# Patient Record
Sex: FEMALE | Race: WHITE | Employment: FULL TIME | URBAN - METROPOLITAN AREA
[De-identification: names, ages, dates, MRNs, and addresses within clinical notes are randomized per-mention and may not be internally consistent; named-entity substitution may affect disease eponyms.]

---

## 2017-02-16 PROBLEM — I26.99 PULMONARY EMBOLI (HCC): Status: ACTIVE | Noted: 2017-02-16

## 2017-07-17 ENCOUNTER — OFFICE VISIT (OUTPATIENT)
Dept: SURGERY | Age: 22
End: 2017-07-17

## 2017-07-17 VITALS
SYSTOLIC BLOOD PRESSURE: 126 MMHG | WEIGHT: 126 LBS | BODY MASS INDEX: 23.19 KG/M2 | DIASTOLIC BLOOD PRESSURE: 74 MMHG | HEIGHT: 62 IN

## 2017-07-17 DIAGNOSIS — K80.20 CALCULUS OF GALLBLADDER WITHOUT CHOLECYSTITIS WITHOUT OBSTRUCTION: Primary | ICD-10-CM

## 2017-07-17 PROCEDURE — 99204 OFFICE O/P NEW MOD 45 MIN: CPT | Performed by: SURGERY

## 2017-07-17 RX ORDER — OXYCODONE HYDROCHLORIDE 5 MG/1
TABLET ORAL
Qty: 20 TABLET | Refills: 0 | Status: SHIPPED | OUTPATIENT
Start: 2017-07-17 | End: 2017-07-27 | Stop reason: HOSPADM

## 2017-07-17 ASSESSMENT — ENCOUNTER SYMPTOMS
ABDOMINAL DISTENTION: 0
WHEEZING: 1
SHORTNESS OF BREATH: 1
BACK PAIN: 1
FACIAL SWELLING: 1
CHEST TIGHTNESS: 1
EYE PAIN: 1
COLOR CHANGE: 0
ABDOMINAL PAIN: 0
COUGH: 1

## 2017-07-27 ENCOUNTER — HOSPITAL ENCOUNTER (OUTPATIENT)
Dept: SURGERY | Age: 22
Discharge: HOME | End: 2017-07-27
Attending: SURGERY | Admitting: SURGERY

## 2017-07-27 VITALS
OXYGEN SATURATION: 100 % | BODY MASS INDEX: 23.7 KG/M2 | HEIGHT: 62 IN | SYSTOLIC BLOOD PRESSURE: 107 MMHG | RESPIRATION RATE: 17 BRPM | WEIGHT: 128.8 LBS | HEART RATE: 71 BPM | DIASTOLIC BLOOD PRESSURE: 67 MMHG | TEMPERATURE: 96.8 F

## 2017-07-27 DIAGNOSIS — R52 PAIN: ICD-10-CM

## 2017-07-27 LAB — HCG(URINE) PREGNANCY TEST: NEGATIVE

## 2017-07-27 PROCEDURE — 47563 LAPARO CHOLECYSTECTOMY/GRAPH: CPT | Performed by: SURGERY

## 2017-07-27 RX ORDER — LIDOCAINE HYDROCHLORIDE 10 MG/ML
1 INJECTION, SOLUTION EPIDURAL; INFILTRATION; INTRACAUDAL; PERINEURAL
Status: ACTIVE | OUTPATIENT
Start: 2017-07-27 | End: 2017-07-27

## 2017-07-27 RX ORDER — SODIUM CHLORIDE 9 MG/ML
INJECTION, SOLUTION INTRAVENOUS CONTINUOUS
Status: DISCONTINUED | OUTPATIENT
Start: 2017-07-27 | End: 2017-07-28 | Stop reason: HOSPADM

## 2017-07-27 RX ORDER — INDOCYANINE GREEN AND WATER 25 MG
KIT INJECTION
Status: COMPLETED
Start: 2017-07-27 | End: 2017-07-27

## 2017-07-27 RX ORDER — PROMETHAZINE HYDROCHLORIDE 25 MG/ML
6.25 INJECTION, SOLUTION INTRAMUSCULAR; INTRAVENOUS ONCE
Status: COMPLETED | OUTPATIENT
Start: 2017-07-27 | End: 2017-07-27

## 2017-07-27 RX ORDER — CEFAZOLIN SODIUM 2 G/100ML
2 INJECTION, SOLUTION INTRAVENOUS ONCE
Status: COMPLETED | OUTPATIENT
Start: 2017-07-27 | End: 2017-07-27

## 2017-07-27 RX ORDER — MEPERIDINE HYDROCHLORIDE 25 MG/ML
12.5 INJECTION INTRAMUSCULAR; INTRAVENOUS; SUBCUTANEOUS EVERY 5 MIN PRN
Status: DISCONTINUED | OUTPATIENT
Start: 2017-07-27 | End: 2017-07-28 | Stop reason: HOSPADM

## 2017-07-27 RX ORDER — OXYCODONE HYDROCHLORIDE 5 MG/1
TABLET ORAL
Qty: 40 TABLET | Refills: 0 | Status: SHIPPED | OUTPATIENT
Start: 2017-07-27 | End: 2017-08-11 | Stop reason: SDUPTHER

## 2017-07-27 RX ORDER — HYDROMORPHONE HCL 110MG/55ML
0.5 PATIENT CONTROLLED ANALGESIA SYRINGE INTRAVENOUS EVERY 5 MIN PRN
Status: DISCONTINUED | OUTPATIENT
Start: 2017-07-27 | End: 2017-07-28 | Stop reason: HOSPADM

## 2017-07-27 RX ORDER — HYDROCODONE BITARTRATE AND ACETAMINOPHEN 5; 325 MG/1; MG/1
1 TABLET ORAL
Status: ACTIVE | OUTPATIENT
Start: 2017-07-27 | End: 2017-07-27

## 2017-07-27 RX ORDER — PROMETHAZINE HYDROCHLORIDE 25 MG/ML
INJECTION, SOLUTION INTRAMUSCULAR; INTRAVENOUS
Status: DISPENSED
Start: 2017-07-27 | End: 2017-07-27

## 2017-07-27 RX ORDER — HYDROMORPHONE HCL 110MG/55ML
0.25 PATIENT CONTROLLED ANALGESIA SYRINGE INTRAVENOUS EVERY 5 MIN PRN
Status: DISCONTINUED | OUTPATIENT
Start: 2017-07-27 | End: 2017-07-28 | Stop reason: HOSPADM

## 2017-07-27 RX ORDER — INDOCYANINE GREEN AND WATER 25 MG
5 KIT INJECTION ONCE
Status: COMPLETED | OUTPATIENT
Start: 2017-07-27 | End: 2017-07-27

## 2017-07-27 RX ORDER — ONDANSETRON 2 MG/ML
4 INJECTION INTRAMUSCULAR; INTRAVENOUS
Status: COMPLETED | OUTPATIENT
Start: 2017-07-27 | End: 2017-07-27

## 2017-07-27 RX ORDER — CEFAZOLIN SODIUM 2 G/100ML
INJECTION, SOLUTION INTRAVENOUS
Status: COMPLETED
Start: 2017-07-27 | End: 2017-07-27

## 2017-07-27 RX ADMIN — INDOCYANINE GREEN AND WATER 5 MG: KIT at 09:45

## 2017-07-27 RX ADMIN — PROMETHAZINE HYDROCHLORIDE 6.25 MG: 25 INJECTION, SOLUTION INTRAMUSCULAR; INTRAVENOUS at 11:54

## 2017-07-27 RX ADMIN — Medication 0.5 MG: at 11:48

## 2017-07-27 RX ADMIN — CEFAZOLIN SODIUM 2 G: 2 INJECTION, SOLUTION INTRAVENOUS at 09:54

## 2017-07-27 RX ADMIN — ONDANSETRON 4 MG: 2 INJECTION INTRAMUSCULAR; INTRAVENOUS at 11:48

## 2017-07-27 ASSESSMENT — PAIN DESCRIPTION - PAIN TYPE
TYPE: SURGICAL PAIN

## 2017-07-27 ASSESSMENT — PAIN DESCRIPTION - LOCATION: LOCATION: ABDOMEN

## 2017-07-27 ASSESSMENT — PAIN SCALES - GENERAL
PAINLEVEL_OUTOF10: 4
PAINLEVEL_OUTOF10: 9
PAINLEVEL_OUTOF10: 5
PAINLEVEL_OUTOF10: 0
PAINLEVEL_OUTOF10: 4
PAINLEVEL_OUTOF10: 9
PAINLEVEL_OUTOF10: 4
PAINLEVEL_OUTOF10: 9

## 2017-07-27 ASSESSMENT — PAIN - FUNCTIONAL ASSESSMENT: PAIN_FUNCTIONAL_ASSESSMENT: 0-10

## 2017-07-27 ASSESSMENT — PAIN DESCRIPTION - ORIENTATION: ORIENTATION: LEFT

## 2017-08-11 ENCOUNTER — OFFICE VISIT (OUTPATIENT)
Dept: SURGERY | Age: 22
End: 2017-08-11

## 2017-08-11 VITALS — SYSTOLIC BLOOD PRESSURE: 106 MMHG | WEIGHT: 129.6 LBS | BODY MASS INDEX: 23.7 KG/M2 | DIASTOLIC BLOOD PRESSURE: 72 MMHG

## 2017-08-11 DIAGNOSIS — Z09 SURGERY FOLLOW-UP: Primary | ICD-10-CM

## 2017-08-11 PROCEDURE — 99024 POSTOP FOLLOW-UP VISIT: CPT | Performed by: SURGERY

## 2017-08-11 RX ORDER — OXYCODONE HYDROCHLORIDE 5 MG/1
TABLET ORAL
Qty: 20 TABLET | Refills: 0 | Status: SHIPPED | OUTPATIENT
Start: 2017-08-11 | End: 2018-01-31 | Stop reason: ALTCHOICE

## 2018-04-06 PROBLEM — F22 PSYCHOSIS, PARANOID (HCC): Status: ACTIVE | Noted: 2018-04-06

## 2018-04-06 PROBLEM — F12.20 CANNABIS USE DISORDER, MODERATE, DEPENDENCE (HCC): Status: ACTIVE | Noted: 2018-02-08

## 2018-10-26 ENCOUNTER — HOSPITAL ENCOUNTER (EMERGENCY)
Age: 23
Discharge: HOME | End: 2018-10-26
Attending: EMERGENCY MEDICINE
Payer: MEDICAID

## 2018-10-26 VITALS
WEIGHT: 163.19 LBS | BODY MASS INDEX: 28.91 KG/M2 | TEMPERATURE: 98.4 F | OXYGEN SATURATION: 98 % | RESPIRATION RATE: 16 BRPM | HEIGHT: 63 IN | DIASTOLIC BLOOD PRESSURE: 78 MMHG | SYSTOLIC BLOOD PRESSURE: 119 MMHG | HEART RATE: 90 BPM

## 2018-10-26 DIAGNOSIS — H65.03 BILATERAL ACUTE SEROUS OTITIS MEDIA, RECURRENCE NOT SPECIFIED: ICD-10-CM

## 2018-10-26 DIAGNOSIS — J20.9 ACUTE BRONCHITIS, UNSPECIFIED ORGANISM: Primary | ICD-10-CM

## 2018-10-26 PROCEDURE — 99283 EMERGENCY DEPT VISIT LOW MDM: CPT

## 2018-10-26 PROCEDURE — 6370000000 HC RX 637 (ALT 250 FOR IP): Performed by: EMERGENCY MEDICINE

## 2018-10-26 RX ORDER — PREDNISONE 20 MG/1
40 TABLET ORAL DAILY
Qty: 10 TABLET | Refills: 0 | Status: SHIPPED | OUTPATIENT
Start: 2018-10-26 | End: 2018-10-31

## 2018-10-26 RX ORDER — CIPROFLOXACIN 500 MG/1
500 TABLET, FILM COATED ORAL 2 TIMES DAILY
Qty: 20 TABLET | Refills: 0 | Status: SHIPPED | OUTPATIENT
Start: 2018-10-26 | End: 2018-11-05

## 2018-10-26 RX ORDER — ALBUTEROL SULFATE 90 UG/1
2 AEROSOL, METERED RESPIRATORY (INHALATION) EVERY 4 HOURS PRN
Qty: 1 INHALER | Refills: 3 | Status: SHIPPED | OUTPATIENT
Start: 2018-10-26 | End: 2019-01-25

## 2018-10-26 RX ORDER — PREDNISONE 20 MG/1
40 TABLET ORAL ONCE
Status: COMPLETED | OUTPATIENT
Start: 2018-10-26 | End: 2018-10-26

## 2018-10-26 RX ORDER — IPRATROPIUM BROMIDE AND ALBUTEROL SULFATE 2.5; .5 MG/3ML; MG/3ML
1 SOLUTION RESPIRATORY (INHALATION) ONCE
Status: COMPLETED | OUTPATIENT
Start: 2018-10-26 | End: 2018-10-26

## 2018-10-26 RX ADMIN — PREDNISONE 40 MG: 20 TABLET ORAL at 13:33

## 2018-10-26 RX ADMIN — IPRATROPIUM BROMIDE AND ALBUTEROL SULFATE 1 AMPULE: .5; 3 SOLUTION RESPIRATORY (INHALATION) at 12:58

## 2018-10-26 ASSESSMENT — PAIN DESCRIPTION - LOCATION: LOCATION: BACK

## 2018-10-26 ASSESSMENT — ENCOUNTER SYMPTOMS
EYE DISCHARGE: 0
NAUSEA: 0
SHORTNESS OF BREATH: 1
BACK PAIN: 0
DIARRHEA: 0
ABDOMINAL PAIN: 0
VOMITING: 0
COUGH: 1
WHEEZING: 0
SORE THROAT: 0
RHINORRHEA: 0
EYE PAIN: 0

## 2018-10-26 ASSESSMENT — PAIN SCALES - GENERAL
PAINLEVEL_OUTOF10: 0
PAINLEVEL_OUTOF10: 3

## 2018-10-26 NOTE — ED NOTES
Discharge instructions given to patient. She states understanding. Patient discharged to home.       Shannon Mcgill RN  10/26/18 [id number]

## 2018-12-23 ENCOUNTER — HOSPITAL ENCOUNTER (EMERGENCY)
Age: 23
Discharge: HOME | End: 2018-12-23
Payer: MEDICAID

## 2018-12-23 ENCOUNTER — APPOINTMENT (OUTPATIENT)
Dept: GENERAL RADIOLOGY | Age: 23
End: 2018-12-23
Payer: MEDICAID

## 2018-12-23 VITALS
HEART RATE: 88 BPM | SYSTOLIC BLOOD PRESSURE: 124 MMHG | DIASTOLIC BLOOD PRESSURE: 78 MMHG | TEMPERATURE: 98.6 F | BODY MASS INDEX: 30.89 KG/M2 | OXYGEN SATURATION: 98 % | WEIGHT: 174.38 LBS | RESPIRATION RATE: 14 BRPM
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
## 2018-12-23 DIAGNOSIS — M54.50 ACUTE MIDLINE LOW BACK PAIN WITHOUT SCIATICA: ICD-10-CM

## 2018-12-23 LAB — HCG(URINE) PREGNANCY TEST: NEGATIVE

## 2018-12-23 PROCEDURE — 84703 CHORIONIC GONADOTROPIN ASSAY: CPT

## 2018-12-23 PROCEDURE — 72100 X-RAY EXAM L-S SPINE 2/3 VWS: CPT

## 2018-12-23 PROCEDURE — 6370000000 HC RX 637 (ALT 250 FOR IP): Performed by: PHYSICIAN ASSISTANT

## 2018-12-23 PROCEDURE — 99284 EMERGENCY DEPT VISIT MOD MDM: CPT

## 2018-12-23 RX ORDER — CYCLOBENZAPRINE HCL 10 MG
10 TABLET ORAL 3 TIMES DAILY PRN
Qty: 20 TABLET | Refills: 0 | Status: SHIPPED | OUTPATIENT
Start: 2018-12-23 | End: 2019-01-25

## 2018-12-23 RX ORDER — IBUPROFEN 400 MG/1
800 TABLET ORAL ONCE
Status: COMPLETED | OUTPATIENT
Start: 2018-12-23 | End: 2018-12-23

## 2018-12-23 RX ORDER — IBUPROFEN 800 MG/1
800 TABLET ORAL EVERY 8 HOURS PRN
Qty: 20 TABLET | Refills: 0 | Status: SHIPPED | OUTPATIENT
Start: 2018-12-23 | End: 2019-01-25

## 2018-12-23 RX ORDER — HYDROCODONE BITARTRATE AND ACETAMINOPHEN 5; 325 MG/1; MG/1
1 TABLET ORAL EVERY 6 HOURS PRN
Qty: 5 TABLET | Refills: 0 | Status: SHIPPED | OUTPATIENT
Start: 2018-12-23 | End: 2018-12-26

## 2018-12-23 RX ORDER — ACETAMINOPHEN 325 MG/1
650 TABLET ORAL ONCE
Status: COMPLETED | OUTPATIENT
Start: 2018-12-23 | End: 2018-12-23

## 2018-12-23 RX ADMIN — IBUPROFEN 800 MG: 400 TABLET ORAL at 11:45

## 2018-12-23 RX ADMIN — ACETAMINOPHEN 650 MG: 325 TABLET, FILM COATED ORAL at 11:45

## 2018-12-23 ASSESSMENT — ENCOUNTER SYMPTOMS
ABDOMINAL PAIN: 0
VOMITING: 0
SHORTNESS OF BREATH: 0
NAUSEA: 1
BACK PAIN: 1

## 2018-12-23 ASSESSMENT — PAIN DESCRIPTION - PAIN TYPE: TYPE: ACUTE PAIN

## 2018-12-23 ASSESSMENT — PAIN SCALES - GENERAL
PAINLEVEL_OUTOF10: 8
PAINLEVEL_OUTOF10: 8
PAINLEVEL_OUTOF10: 7

## 2018-12-23 ASSESSMENT — PAIN DESCRIPTION - ORIENTATION: ORIENTATION: RIGHT;LEFT;LOWER

## 2018-12-23 ASSESSMENT — PAIN DESCRIPTION - LOCATION: LOCATION: BACK

## 2018-12-23 ASSESSMENT — PAIN DESCRIPTION - DESCRIPTORS: DESCRIPTORS: ACHING

## 2018-12-23 ASSESSMENT — PAIN DESCRIPTION - FREQUENCY: FREQUENCY: CONTINUOUS

## 2018-12-23 NOTE — ED PROVIDER NOTES
well-nourished. HENT:   Head: Normocephalic and atraumatic. Right Ear: External ear normal.   Left Ear: External ear normal.   Nose: Nose normal.   Mouth/Throat: Oropharynx is clear and moist.   TMs clear bilaterally. No hemotympanum. No raccoon eyes or wolfe signs bilaterally. Eyes: Pupils are equal, round, and reactive to light. Conjunctivae and EOM are normal.   Neck: Normal range of motion. Neck supple. No tenderness palpation of the cervical midline spine   Cardiovascular: Normal rate, regular rhythm and normal heart sounds. Pulmonary/Chest: Effort normal and breath sounds normal. No respiratory distress. Abdominal: Soft. She exhibits no distension and no mass. There is no tenderness. There is no rebound and no guarding. No hernia. No bruising on abdomen   Musculoskeletal: Normal range of motion. No bruising across chest.  No tenderness palpation of the thoracic midline. Mild tender to palpation of the lumbar midline and the left paraspinal area. No right paraspinal tenderness. No bruising on back. No bruising on flanks  bilaterally. Neurological: She is alert and oriented to person, place, and time. Normal sensation in all 3 dermatomes of the feet bilaterally. DP pulses 2+ bilaterally. Achilles DTRs 2+ bilaterally. 5/5 strength inversions eversion plantar flexion dorsiflexion bilateral feet. Skin: Skin is warm and dry. Psychiatric: She has a normal mood and affect.  Her behavior is normal. Judgment and thought content normal.       DIFFERENTIAL DIAGNOSIS   Cauda equina, spinal stenosis, strain, disk herniation, epidural abscess, pylonephritis,Kidney stone, epidural hematoma      DIAGNOSTIC RESULTS       RADIOLOGY:   Non-plain film images such as CT, Ultrasound and MRI are read by the radiologist.Plain radiographic images are visualized and preliminarily interpreted by LOVELY Potter with the below findings:      Interpretation per theRadiologist below, if available

## 2019-01-25 ENCOUNTER — APPOINTMENT (OUTPATIENT)
Dept: ULTRASOUND IMAGING | Age: 24
End: 2019-01-25
Payer: MEDICAID

## 2019-01-25 ENCOUNTER — APPOINTMENT (OUTPATIENT)
Dept: CT IMAGING | Age: 24
End: 2019-01-25
Payer: MEDICAID

## 2019-01-25 ENCOUNTER — HOSPITAL ENCOUNTER (EMERGENCY)
Age: 24
Discharge: HOME | End: 2019-01-25
Payer: MEDICAID

## 2019-01-25 VITALS
DIASTOLIC BLOOD PRESSURE: 63 MMHG | HEIGHT: 65 IN | HEART RATE: 84 BPM | RESPIRATION RATE: 20 BRPM | WEIGHT: 174.6 LBS | OXYGEN SATURATION: 100 % | BODY MASS INDEX: 29.09 KG/M2 | TEMPERATURE: 98.3 F | SYSTOLIC BLOOD PRESSURE: 102 MMHG

## 2019-01-25 DIAGNOSIS — N10 ACUTE PYELONEPHRITIS: Primary | ICD-10-CM

## 2019-01-25 LAB
A/G RATIO: 1.8 (ref 1.1–2.2)
ALBUMIN SERPL-MCNC: 4.4 G/DL (ref 3.4–5)
ALP BLD-CCNC: 48 U/L (ref 40–129)
ALT SERPL-CCNC: 26 U/L (ref 10–40)
ANION GAP SERPL CALCULATED.3IONS-SCNC: 10 MMOL/L (ref 3–16)
AST SERPL-CCNC: 20 U/L (ref 15–37)
BACTERIA WET PREP: NORMAL
BACTERIA: ABNORMAL /HPF
BASOPHILS ABSOLUTE: 0 K/UL (ref 0–0.2)
BASOPHILS RELATIVE PERCENT: 0.7 %
BILIRUB SERPL-MCNC: 0.6 MG/DL (ref 0–1)
BILIRUBIN URINE: ABNORMAL
BLOOD, URINE: NEGATIVE
BUN BLDV-MCNC: 9 MG/DL (ref 7–20)
CALCIUM SERPL-MCNC: 8.6 MG/DL (ref 8.3–10.6)
CHLORIDE BLD-SCNC: 104 MMOL/L (ref 99–110)
CLARITY: ABNORMAL
CLUE CELLS: NORMAL
CO2: 25 MMOL/L (ref 21–32)
COLOR: ABNORMAL
CREAT SERPL-MCNC: 0.6 MG/DL (ref 0.6–1.1)
EOSINOPHILS ABSOLUTE: 0.1 K/UL (ref 0–0.6)
EOSINOPHILS RELATIVE PERCENT: 2.1 %
EPITHELIAL CELLS WET PREP: NORMAL
EPITHELIAL CELLS, UA: 7 /HPF (ref 0–5)
GFR AFRICAN AMERICAN: >60
GFR NON-AFRICAN AMERICAN: >60
GLOBULIN: 2.4 G/DL
GLUCOSE BLD-MCNC: 89 MG/DL (ref 70–99)
GLUCOSE URINE: NEGATIVE MG/DL
GONADOTROPIN, CHORIONIC (HCG) QUANT: <5 MIU/ML
HCG(URINE) PREGNANCY TEST: NEGATIVE
HCT VFR BLD CALC: 42.6 % (ref 36–48)
HEMOGLOBIN: 14.5 G/DL (ref 12–16)
KETONES, URINE: NEGATIVE MG/DL
LEUKOCYTE ESTERASE, URINE: ABNORMAL
LIPASE: 12 U/L (ref 13–60)
LYMPHOCYTES ABSOLUTE: 1.1 K/UL (ref 1–5.1)
LYMPHOCYTES RELATIVE PERCENT: 23.5 %
MCH RBC QN AUTO: 29.4 PG (ref 26–34)
MCHC RBC AUTO-ENTMCNC: 33.9 G/DL (ref 31–36)
MCV RBC AUTO: 86.7 FL (ref 80–100)
MICROSCOPIC EXAMINATION: YES
MONOCYTES ABSOLUTE: 0.8 K/UL (ref 0–1.3)
MONOCYTES RELATIVE PERCENT: 16.4 %
NEUTROPHILS ABSOLUTE: 2.7 K/UL (ref 1.7–7.7)
NEUTROPHILS RELATIVE PERCENT: 57.3 %
NITRITE, URINE: NEGATIVE
PDW BLD-RTO: 15.4 % (ref 12.4–15.4)
PH UA: 6
PLATELET # BLD: 198 K/UL (ref 135–450)
PMV BLD AUTO: 8.2 FL (ref 5–10.5)
POTASSIUM SERPL-SCNC: 3.8 MMOL/L (ref 3.5–5.1)
PROTEIN UA: ABNORMAL MG/DL
RBC # BLD: 4.91 M/UL (ref 4–5.2)
RBC UA: ABNORMAL /HPF (ref 0–2)
RBC WET PREP: NORMAL
SODIUM BLD-SCNC: 139 MMOL/L (ref 136–145)
SOURCE WET PREP: NORMAL
SPECIFIC GRAVITY UA: 1.03
TOTAL PROTEIN: 6.8 G/DL (ref 6.4–8.2)
TRICHOMONAS PREP: NORMAL
URINE REFLEX TO CULTURE: YES
URINE TYPE: ABNORMAL
UROBILINOGEN, URINE: 1 E.U./DL
WBC # BLD: 4.7 K/UL (ref 4–11)
WBC UA: 117 /HPF (ref 0–5)
WBC WET PREP: NORMAL
YEAST WET PREP: NORMAL

## 2019-01-25 PROCEDURE — 96365 THER/PROPH/DIAG IV INF INIT: CPT

## 2019-01-25 PROCEDURE — 80053 COMPREHEN METABOLIC PANEL: CPT

## 2019-01-25 PROCEDURE — 81001 URINALYSIS AUTO W/SCOPE: CPT

## 2019-01-25 PROCEDURE — 87591 N.GONORRHOEAE DNA AMP PROB: CPT

## 2019-01-25 PROCEDURE — 96361 HYDRATE IV INFUSION ADD-ON: CPT

## 2019-01-25 PROCEDURE — 36415 COLL VENOUS BLD VENIPUNCTURE: CPT

## 2019-01-25 PROCEDURE — 85025 COMPLETE CBC W/AUTO DIFF WBC: CPT

## 2019-01-25 PROCEDURE — 6360000002 HC RX W HCPCS: Performed by: PHYSICIAN ASSISTANT

## 2019-01-25 PROCEDURE — 76830 TRANSVAGINAL US NON-OB: CPT

## 2019-01-25 PROCEDURE — 84703 CHORIONIC GONADOTROPIN ASSAY: CPT

## 2019-01-25 PROCEDURE — 84702 CHORIONIC GONADOTROPIN TEST: CPT

## 2019-01-25 PROCEDURE — 99284 EMERGENCY DEPT VISIT MOD MDM: CPT

## 2019-01-25 PROCEDURE — 2580000003 HC RX 258: Performed by: PHYSICIAN ASSISTANT

## 2019-01-25 PROCEDURE — 76856 US EXAM PELVIC COMPLETE: CPT

## 2019-01-25 PROCEDURE — 83690 ASSAY OF LIPASE: CPT

## 2019-01-25 PROCEDURE — 96376 TX/PRO/DX INJ SAME DRUG ADON: CPT

## 2019-01-25 PROCEDURE — 87086 URINE CULTURE/COLONY COUNT: CPT

## 2019-01-25 PROCEDURE — 74176 CT ABD & PELVIS W/O CONTRAST: CPT

## 2019-01-25 PROCEDURE — 87491 CHLMYD TRACH DNA AMP PROBE: CPT

## 2019-01-25 PROCEDURE — 87210 SMEAR WET MOUNT SALINE/INK: CPT

## 2019-01-25 PROCEDURE — 96375 TX/PRO/DX INJ NEW DRUG ADDON: CPT

## 2019-01-25 RX ORDER — TRAMADOL HYDROCHLORIDE 50 MG/1
50 TABLET ORAL EVERY 6 HOURS PRN
Qty: 6 TABLET | Refills: 0 | Status: SHIPPED | OUTPATIENT
Start: 2019-01-25 | End: 2019-01-28

## 2019-01-25 RX ORDER — KETOROLAC TROMETHAMINE 30 MG/ML
30 INJECTION, SOLUTION INTRAMUSCULAR; INTRAVENOUS ONCE
Status: COMPLETED | OUTPATIENT
Start: 2019-01-25 | End: 2019-01-25

## 2019-01-25 RX ORDER — ONDANSETRON 2 MG/ML
4 INJECTION INTRAMUSCULAR; INTRAVENOUS ONCE
Status: COMPLETED | OUTPATIENT
Start: 2019-01-25 | End: 2019-01-25

## 2019-01-25 RX ORDER — 0.9 % SODIUM CHLORIDE 0.9 %
1000 INTRAVENOUS SOLUTION INTRAVENOUS ONCE
Status: COMPLETED | OUTPATIENT
Start: 2019-01-25 | End: 2019-01-25

## 2019-01-25 RX ORDER — KETOROLAC TROMETHAMINE 10 MG/1
10 TABLET, FILM COATED ORAL EVERY 6 HOURS PRN
Qty: 10 TABLET | Refills: 0 | Status: SHIPPED | OUTPATIENT
Start: 2019-01-25 | End: 2019-04-24 | Stop reason: ALTCHOICE

## 2019-01-25 RX ORDER — CEFUROXIME AXETIL 500 MG/1
500 TABLET ORAL 2 TIMES DAILY
Qty: 20 TABLET | Refills: 0 | Status: SHIPPED | OUTPATIENT
Start: 2019-01-25 | End: 2019-02-04

## 2019-01-25 RX ORDER — ONDANSETRON 4 MG/1
4 TABLET, ORALLY DISINTEGRATING ORAL EVERY 8 HOURS PRN
Qty: 10 TABLET | Refills: 0 | Status: SHIPPED | OUTPATIENT
Start: 2019-01-25 | End: 2019-04-24 | Stop reason: ALTCHOICE

## 2019-01-25 RX ORDER — DIPHENHYDRAMINE HYDROCHLORIDE 50 MG/ML
25 INJECTION INTRAMUSCULAR; INTRAVENOUS ONCE
Status: COMPLETED | OUTPATIENT
Start: 2019-01-25 | End: 2019-01-25

## 2019-01-25 RX ADMIN — HYDROMORPHONE HYDROCHLORIDE 0.5 MG: 1 INJECTION, SOLUTION INTRAMUSCULAR; INTRAVENOUS; SUBCUTANEOUS at 09:58

## 2019-01-25 RX ADMIN — KETOROLAC TROMETHAMINE 30 MG: 30 INJECTION, SOLUTION INTRAMUSCULAR at 12:03

## 2019-01-25 RX ADMIN — SODIUM CHLORIDE 1000 ML: 9 INJECTION, SOLUTION INTRAVENOUS at 09:35

## 2019-01-25 RX ADMIN — ONDANSETRON 4 MG: 2 INJECTION INTRAMUSCULAR; INTRAVENOUS at 09:35

## 2019-01-25 RX ADMIN — DIPHENHYDRAMINE HYDROCHLORIDE 25 MG: 50 INJECTION, SOLUTION INTRAMUSCULAR; INTRAVENOUS at 12:03

## 2019-01-25 RX ADMIN — CEFTRIAXONE 1 G: 1 INJECTION, POWDER, FOR SOLUTION INTRAMUSCULAR; INTRAVENOUS at 12:23

## 2019-01-25 RX ADMIN — HYDROMORPHONE HYDROCHLORIDE 1 MG: 1 INJECTION, SOLUTION INTRAMUSCULAR; INTRAVENOUS; SUBCUTANEOUS at 10:43

## 2019-01-25 ASSESSMENT — ENCOUNTER SYMPTOMS
SORE THROAT: 0
BACK PAIN: 1
SHORTNESS OF BREATH: 0
DIARRHEA: 0
ABDOMINAL PAIN: 1
COUGH: 0
CONSTIPATION: 1
BLOOD IN STOOL: 0
NAUSEA: 0
VOMITING: 1

## 2019-01-25 ASSESSMENT — PAIN SCALES - GENERAL
PAINLEVEL_OUTOF10: 4
PAINLEVEL_OUTOF10: 4
PAINLEVEL_OUTOF10: 10
PAINLEVEL_OUTOF10: 5
PAINLEVEL_OUTOF10: 10
PAINLEVEL_OUTOF10: 5
PAINLEVEL_OUTOF10: 7

## 2019-01-25 ASSESSMENT — PAIN DESCRIPTION - PAIN TYPE
TYPE: ACUTE PAIN
TYPE: ACUTE PAIN

## 2019-01-25 ASSESSMENT — PAIN DESCRIPTION - LOCATION
LOCATION: ABDOMEN
LOCATION: ABDOMEN

## 2019-01-25 ASSESSMENT — PAIN SCALES - WONG BAKER: WONGBAKER_NUMERICALRESPONSE: 0

## 2019-01-26 LAB — URINE CULTURE, ROUTINE: NORMAL

## 2019-01-28 LAB
C TRACH DNA GENITAL QL NAA+PROBE: NEGATIVE
N. GONORRHOEAE DNA: NEGATIVE

## 2019-04-10 ENCOUNTER — HOSPITAL ENCOUNTER (EMERGENCY)
Age: 24
Discharge: HOME | End: 2019-04-10
Payer: MEDICAID

## 2019-04-10 VITALS
RESPIRATION RATE: 16 BRPM | WEIGHT: 167.55 LBS | SYSTOLIC BLOOD PRESSURE: 120 MMHG | OXYGEN SATURATION: 98 % | HEIGHT: 64 IN | HEART RATE: 87 BPM | DIASTOLIC BLOOD PRESSURE: 72 MMHG | TEMPERATURE: 98.4 F | BODY MASS INDEX: 28.6 KG/M2

## 2019-04-10 DIAGNOSIS — T16.1XXA ACUTE FOREIGN BODY OF RIGHT EAR CANAL, INITIAL ENCOUNTER: Primary | ICD-10-CM

## 2019-04-10 PROCEDURE — 99282 EMERGENCY DEPT VISIT SF MDM: CPT

## 2019-04-10 PROCEDURE — 6370000000 HC RX 637 (ALT 250 FOR IP): Performed by: PHYSICIAN ASSISTANT

## 2019-04-10 PROCEDURE — 4500000022 HC ED LEVEL 2 PROCEDURE

## 2019-04-10 RX ORDER — HYDROCODONE BITARTRATE AND ACETAMINOPHEN 5; 325 MG/1; MG/1
1 TABLET ORAL EVERY 6 HOURS PRN
Qty: 8 TABLET | Refills: 0 | Status: SHIPPED | OUTPATIENT
Start: 2019-04-10 | End: 2019-04-12

## 2019-04-10 RX ORDER — OXYCODONE HYDROCHLORIDE AND ACETAMINOPHEN 5; 325 MG/1; MG/1
1 TABLET ORAL ONCE
Status: COMPLETED | OUTPATIENT
Start: 2019-04-10 | End: 2019-04-10

## 2019-04-10 RX ADMIN — OXYCODONE HYDROCHLORIDE AND ACETAMINOPHEN 1 TABLET: 5; 325 TABLET ORAL at 13:26

## 2019-04-10 ASSESSMENT — PAIN SCALES - GENERAL
PAINLEVEL_OUTOF10: 10
PAINLEVEL_OUTOF10: 10

## 2019-04-10 ASSESSMENT — PAIN DESCRIPTION - PROGRESSION: CLINICAL_PROGRESSION: NOT CHANGED

## 2019-04-10 ASSESSMENT — PAIN DESCRIPTION - PAIN TYPE: TYPE: ACUTE PAIN

## 2019-04-10 ASSESSMENT — PAIN DESCRIPTION - DESCRIPTORS: DESCRIPTORS: ACHING

## 2019-04-10 ASSESSMENT — PAIN - FUNCTIONAL ASSESSMENT: PAIN_FUNCTIONAL_ASSESSMENT: ACTIVITIES ARE NOT PREVENTED

## 2019-04-10 ASSESSMENT — PAIN DESCRIPTION - ONSET: ONSET: ON-GOING

## 2019-04-10 ASSESSMENT — PAIN DESCRIPTION - FREQUENCY: FREQUENCY: CONTINUOUS

## 2019-04-10 ASSESSMENT — PAIN DESCRIPTION - LOCATION: LOCATION: EAR

## 2019-04-10 ASSESSMENT — PAIN DESCRIPTION - ORIENTATION: ORIENTATION: RIGHT

## 2019-04-10 NOTE — ED NOTES
Dc instructions completed with pt. Pt verbalized understanding. rx x1. ENT for follow up. Pt was ambulatory to exit.       Mery Palomares RN  04/10/19 [id number]

## 2019-04-10 NOTE — ED PROVIDER NOTES
[facility]  eMERGENCY dEPARTMENT eNCOUnter      Pt Name: Romy Pérez  MRN: [id number]  [place] 1995  Date of evaluation: 4/10/2019  Provider: Brea Russell PA-C    CHIEF COMPLAINT       Chief Complaint   Patient presents with    Foreign Body     pt reports went to urgent care for right ear pain and was informed there was a foreign body         HISTORYOF PRESENT ILLNESS  (Location/Symptom, Timing/Onset, Context/Setting, Quality, Duration, Modifying Factors, Severity.)   Romy Pérez is a 21 y.o. female who presents to the emergency department complaining of right ear pain. Pain began gradually over the past several days has progressively worsened. Pain is constant and worse to the touch. She rates as 10 out of 10. She has taken nothing for pain. She went to urgent care today and was found to have a foreign body in the ear. She was sent here as they were unable to remove it successfully in the urgent care. Nursing Notes were reviewedand I agree. REVIEW OF SYSTEMS    (2-9 systems for level 4, 10 or more forlevel 5)     Review of Systems   Constitutional: Negative for chills and fever. HENT: Positive for ear pain. Except as noted above the remainder ofthe review of systems was reviewed and negative.        PAST MEDICALHISTORY         Diagnosis Date    Anxiety     Paranoid ([address]     PE (pulmonary thromboembolism) ([address]     Psychosis ([address]        SURGICAL HISTORY           Procedure Laterality Date     SECTION      CHOLECYSTECTOMY, LAPAROSCOPIC  2017    ROBOTIC XI ASSISTED LAPAROSCOPIC CHOLECYSTECTOMY WITH CHOLANGIOGRAM       CURRENT MEDICATIONS       Discharge Medication List as of 4/10/2019  1:27 PM      CONTINUE these medications which have NOT CHANGED    Details   ondansetron (ZOFRAN ODT) 4 MG disintegrating tablet Take 1 tablet by mouth every 8 hours as needed for Nausea Let dissolve in mouth., Disp-10 tablet, R-0Print      ketorolac (TORADOL) 10 MG tablet Take 1 tablet by mouth every 6 hours as needed for Pain, Disp-10 tablet, R-0Print             ALLERGIES     Etonogestrel; Morphine; and Naprosyn [naproxen]    FAMILY HISTORY           Problem Relation Age of Onset    Hypertension Father     Hypertension Mother     No Known Problems Sister      Family Status   Relation Name Status    Father  Alive    Mother  Alive    Sister  Alive        SOCIAL HISTORY    reports that she has been smoking cigarettes. She has a 1.50 pack-year smoking history. She has never used smokeless tobacco. She reports that she does not drink alcohol or use drugs. PHYSICAL EXAM    (up to 7 for level 4, 8 or more for level 5)     ED Triage Vitals [04/10/19 1245]   BP Temp Temp src Pulse Resp SpO2 Height Weight   120/72 98.4 °F (36.9 °C) -- 87 16 98 % 5' 4\" (1.626 m) 167 lb 8.8 oz (76 kg)       Physical Exam   Constitutional: She is oriented to person, place, and time. She appears well-developed and well-nourished. No distress. HENT:   Head: Normocephalic and atraumatic. Right Ear: A foreign body (wedged deep in the canal is a silver metallic FB resembling an earring back with surrounding canal edema/irritation no significant erythema, some bleeding) is present. Neck: Neck supple. Pulmonary/Chest: Effort normal. No respiratory distress. Musculoskeletal: Normal range of motion. Neurological: She is alert and oriented to person, place, and time. Skin: Skin is warm and dry. Psychiatric: She has a normal mood and affect. Her behavior is normal.   Nursing note and vitals reviewed. EMERGENCY DEPARTMENT COURSE and DIFFERENTIAL DIAGNOSIS/MDM:   Vitals:    Vitals:    04/10/19 1245   BP: 120/72   Pulse: 87   Resp: 16   Temp: 98.4 °F (36.9 °C)   SpO2: 98%   Weight: 167 lb 8.8 oz (76 kg)   Height: 5' 4\" (1.626 m)        I have evaluated this patient. My supervising physician was available for consultation.  The patient is nontoxic and afebrile. FB significantly wedged in the canal. Attempted removal via alligator foreceps, patient unable to tolerate due to severe pain. Irrigation performed by nurse also unsuccessful. I contacted Dr. Flako Dyer with ENT who asked that the patient call the office and they would get her in. This was discussed with the patient who expressed understanding. No need for antibiotics at this time. Rx'd short supply Norco for pain. Discussed results, diagnosis and plan with patient and/or family. Questions addressed. Dispositionand follow-up agreed upon. Specific discharge instructions explained. The patient and/or family and I have discussed the diagnosis and risks, and we agree with discharging home to follow-up with their primary care,specialist or referral doctor. We also discussed returning to the Emergency Department immediately if new or worsening symptoms occur. We have discussed the symptoms which are most concerning that necessitate immediatereturn. PROCEDURES:  Foreign Body  Date/Time: 4/10/2019 2:01 PM  Performed by: Wilder Halsted, PA-C  Authorized by: Wilder Halsted, PA-C     Consent:     Consent obtained:  Verbal    Consent given by:  Patient    Risks discussed:  Bleeding, infection, pain, worsening of condition and incomplete removal    Alternatives discussed:  Referral  Location:     Location:  Ear    Ear location:  R ear  Anesthesia (see MAR for exact dosages): Anesthesia method:  None  Procedure type:     Procedure complexity:  Simple  Procedure details:     Localization method:  Visualized    Bloodless field: no      Removal mechanism: Forceps    Foreign bodies recovered:  None    Intact foreign body removal: no    Post-procedure details:     Confirmation:  Residual foreign bodies remain    Patient tolerance of procedure:  Procedure terminated at patient's request        FINAL IMPRESSION      1.  Acute foreign body of right ear canal, initial encounter          DISPOSITION/PLAN DISPOSITION Decision To Discharge 04/10/2019 01:19:46 PM      PATIENT REFERRED TO:  Gerson Gomez MD  [address]  [place] [address] [phone]    Call today  Make an appointment as soon as possible      DISCHARGE MEDICATIONS:  Discharge Medication List as of 4/10/2019  1:27 PM      START taking these medications    Details   HYDROcodone-acetaminophen (NORCO) 5-325 MG per tablet Take 1 tablet by mouth every 6 hours as needed for Pain for up to 2 days.  . Take lowest dose possible to manage pain, Disp-8 tablet, R-0Print             (Please note that portions of this note were completed with a voice recognition program.  Efforts were made toedit the dictations but occasionally words are mis-transcribed.)    AVINASH Comer PA-C  04/10/19 [id number]

## 2019-04-11 ENCOUNTER — OFFICE VISIT (OUTPATIENT)
Dept: ENT CLINIC | Age: 24
End: 2019-04-11
Payer: MEDICAID

## 2019-04-11 VITALS
WEIGHT: 168 LBS | BODY MASS INDEX: 27.99 KG/M2 | SYSTOLIC BLOOD PRESSURE: 116 MMHG | HEIGHT: 65 IN | DIASTOLIC BLOOD PRESSURE: 83 MMHG | HEART RATE: 105 BPM

## 2019-04-11 DIAGNOSIS — T16.1XXA EAR FOREIGN BODY, RIGHT, INITIAL ENCOUNTER: Primary | ICD-10-CM

## 2019-04-11 PROCEDURE — 69200 CLEAR OUTER EAR CANAL: CPT | Performed by: OTOLARYNGOLOGY

## 2019-04-11 RX ORDER — NEOMYCIN SULFATE, POLYMYXIN B SULFATE AND HYDROCORTISONE 10; 3.5; 1 MG/ML; MG/ML; [USP'U]/ML
3 SUSPENSION/ DROPS AURICULAR (OTIC) 3 TIMES DAILY
Qty: 1 BOTTLE | Refills: 0 | Status: SHIPPED | OUTPATIENT
Start: 2019-04-11 | End: 2019-04-18

## 2019-04-11 RX ORDER — CLONAZEPAM 0.5 MG/1
0.5 TABLET ORAL 2 TIMES DAILY PRN
COMMUNITY
End: 2019-05-08 | Stop reason: SDUPTHER

## 2019-04-11 NOTE — PROGRESS NOTES
Here for right ear foreign body. Microscopy   Pre OP: Ear foreign body  Post Operative: same, ear bud  Procedure: Microscopy  After consent was obtained the right ear was examined with an operating microscope. Pertinent findings included EAC foreign body with laceration. Removal was performed with a right angle and alligator forceps performed. The patient tolerated well and there were no complications. I attest I performed the entire procedure myself. Cortisporin for 1 week. Follow up in 1 week.

## 2019-04-18 ENCOUNTER — OFFICE VISIT (OUTPATIENT)
Dept: ENT CLINIC | Age: 24
End: 2019-04-18
Payer: MEDICAID

## 2019-04-18 VITALS
HEIGHT: 65 IN | HEART RATE: 76 BPM | OXYGEN SATURATION: 98 % | SYSTOLIC BLOOD PRESSURE: 120 MMHG | BODY MASS INDEX: 27.32 KG/M2 | WEIGHT: 164 LBS | RESPIRATION RATE: 14 BRPM | DIASTOLIC BLOOD PRESSURE: 70 MMHG

## 2019-04-18 DIAGNOSIS — T16.1XXD FOREIGN BODY OF RIGHT EAR, SUBSEQUENT ENCOUNTER: Primary | ICD-10-CM

## 2019-04-18 PROCEDURE — G8427 DOCREV CUR MEDS BY ELIG CLIN: HCPCS | Performed by: OTOLARYNGOLOGY

## 2019-04-18 PROCEDURE — 99212 OFFICE O/P EST SF 10 MIN: CPT | Performed by: OTOLARYNGOLOGY

## 2019-04-18 PROCEDURE — G8419 CALC BMI OUT NRM PARAM NOF/U: HCPCS | Performed by: OTOLARYNGOLOGY

## 2019-04-18 PROCEDURE — 4004F PT TOBACCO SCREEN RCVD TLK: CPT | Performed by: OTOLARYNGOLOGY

## 2019-04-18 ASSESSMENT — ENCOUNTER SYMPTOMS
APNEA: 0
STRIDOR: 0
CONSTIPATION: 0
SORE THROAT: 0
EYE ITCHING: 0
COUGH: 0
BACK PAIN: 0
BLOOD IN STOOL: 0
WHEEZING: 0
DIARRHEA: 0
CHEST TIGHTNESS: 0
RHINORRHEA: 0
SINUS PRESSURE: 0
VOICE CHANGE: 0
SHORTNESS OF BREATH: 0
SINUS PAIN: 0
EYE PAIN: 0
CHOKING: 0
EYE DISCHARGE: 0
NAUSEA: 0
VOMITING: 0
TROUBLE SWALLOWING: 0
COLOR CHANGE: 0
FACIAL SWELLING: 0
EYE REDNESS: 0

## 2019-04-18 NOTE — PROGRESS NOTES
Subjective:      Patient ID: Pavel Vicente is a 21 y.o. female. HPI  Hearing Loss HPI  CC: right ear pain    General: This is a pleasant 21year old female with a one week follow up from [facility] right ear. Still otalgia and hearing loss. No otorrhea. Using cortisporin. No vertigo.        Patient Active Problem List   Diagnosis    21 y.o.      contractions    Small for dates fetus    Previous  section    History of  delivery    Menorrhagia with irregular cycle    Pulmonary embolism without acute cor [facility]ale [facility]    Cannabis use disorder, moderate, dependence ([facility])    Psychosis, paranoid ([facility]     Past Surgical History:   Procedure Laterality Date     SECTION      CHOLECYSTECTOMY, LAPAROSCOPIC  2017    ROBOTIC XI ASSISTED LAPAROSCOPIC CHOLECYSTECTOMY WITH CHOLANGIOGRAM     Family History   Problem Relation Age of Onset    Hypertension Father     Hypertension Mother     No Known Problems Sister      Social History     Socioeconomic History    Marital status: Single     Spouse name: Not on file    Number of children: Not on file    Years of education: Not on file    Highest education level: Not on file   Occupational History    Not on file   Social Needs    Financial resource strain: Not on file    Food insecurity:     Worry: Not on file     Inability: Not on file    Transportation needs:     Medical: Not on file     Non-medical: Not on file   Tobacco Use    Smoking status: Current Some Day Smoker     Packs/day: 0.50     Years: 3.00     Pack years: 1.50     Types: Cigarettes    Smokeless tobacco: Never Used    Tobacco comment: off and on since Feb   Substance and Sexual Activity    Alcohol use: No    Drug use: No     Comment: occassionally    Sexual activity: Not Currently     Partners: Male   Lifestyle    Physical activity:     Days per week: Not on file     Minutes per session: Not on file    Stress: Not on file   Relationships nervous/anxious. Objective:   Physical Exam   Constitutional: She is oriented to person, place, and time. She appears well-developed and well-nourished. No distress. HENT:   Head: Not macrocephalic and not microcephalic. Head is without Torrez's sign, without contusion, without laceration, without right periorbital erythema and without left periorbital erythema. Right Ear: Hearing, tympanic membrane, external ear and ear canal normal. No lacerations. No drainage, swelling or tenderness. No foreign bodies. No mastoid tenderness. Tympanic membrane is not injected, not perforated, not retracted and not bulging. Tympanic membrane mobility is normal. No middle ear effusion. No hemotympanum. No decreased hearing is noted. Left Ear: Hearing, tympanic membrane, external ear and ear canal normal. No lacerations. No drainage, swelling or tenderness. No foreign bodies. No mastoid tenderness. Tympanic membrane is not injected, not perforated, not retracted and not bulging. Tympanic membrane mobility is normal.  No middle ear effusion. No hemotympanum. No decreased hearing is noted. Ears:    Nose: No mucosal edema or rhinorrhea. Mouth/Throat: No oropharyngeal exudate, posterior oropharyngeal edema, posterior oropharyngeal erythema or tonsillar abscesses. Eyes: Right eye exhibits normal extraocular motion and no nystagmus. Left eye exhibits normal extraocular motion and no nystagmus. Right pupil is reactive. Left pupil is reactive. Neck: Normal range of motion. Neck supple. No thyroid mass and no thyromegaly present. Lymphadenopathy:        Head (right side): No preauricular, no posterior auricular and no occipital adenopathy present. Head (left side): No preauricular, no posterior auricular and no occipital adenopathy present. Right cervical: No superficial cervical, no deep cervical and no posterior cervical adenopathy present.        Left cervical: No superficial cervical, no deep cervical and no posterior cervical adenopathy present. Neurological: She is alert and oriented to person, place, and time. Skin: No bruising and no lesion noted. No erythema. Assessment:       Diagnosis Orders   1. Foreign body of right ear, subsequent encounter             Plan:      Continue cortisporin. Follow up in 2 weeks.          Elver Marquez MD

## 2019-04-23 PROBLEM — F32.A DEPRESSIVE DISORDER: Status: ACTIVE | Noted: 2018-02-02

## 2019-04-23 PROBLEM — F20.9 SCHIZOPHRENIA (HCC): Status: ACTIVE | Noted: 2018-04-07

## 2019-04-23 PROBLEM — Z86.19 HISTORY OF SEXUALLY TRANSMITTED DISEASE: Status: ACTIVE | Noted: 2018-02-02

## 2019-04-23 PROBLEM — F41.9 ANXIETY: Status: ACTIVE | Noted: 2017-02-08

## 2019-04-23 PROBLEM — F33.1 MAJOR DEPRESSIVE DISORDER, RECURRENT EPISODE, MODERATE (HCC): Status: ACTIVE | Noted: 2018-05-07

## 2019-04-24 ENCOUNTER — OFFICE VISIT (OUTPATIENT)
Dept: FAMILY MEDICINE CLINIC | Age: 24
End: 2019-04-24
Payer: MEDICAID

## 2019-04-24 VITALS
TEMPERATURE: 98.4 F | DIASTOLIC BLOOD PRESSURE: 80 MMHG | HEIGHT: 64 IN | SYSTOLIC BLOOD PRESSURE: 122 MMHG | WEIGHT: 163 LBS | BODY MASS INDEX: 27.83 KG/M2

## 2019-04-24 DIAGNOSIS — G47.00 INSOMNIA, UNSPECIFIED TYPE: ICD-10-CM

## 2019-04-24 DIAGNOSIS — F31.62 BIPOLAR DISORDER, CURRENT EPISODE MIXED, MODERATE (HCC): ICD-10-CM

## 2019-04-24 DIAGNOSIS — F41.9 ANXIETY: Primary | ICD-10-CM

## 2019-04-24 PROBLEM — F20.9 SCHIZOPHRENIA (HCC): Status: RESOLVED | Noted: 2018-04-07 | Resolved: 2019-04-24

## 2019-04-24 PROBLEM — F33.1 MAJOR DEPRESSIVE DISORDER, RECURRENT EPISODE, MODERATE (HCC): Status: RESOLVED | Noted: 2018-05-07 | Resolved: 2019-04-24

## 2019-04-24 PROCEDURE — 99203 OFFICE O/P NEW LOW 30 MIN: CPT | Performed by: FAMILY MEDICINE

## 2019-04-24 ASSESSMENT — ENCOUNTER SYMPTOMS
NAUSEA: 1
DIARRHEA: 1
VOMITING: 0
SHORTNESS OF BREATH: 1
CHEST TIGHTNESS: 1
COUGH: 0
ABDOMINAL PAIN: 0

## 2019-04-24 NOTE — PROGRESS NOTES
Problem List    Diagnosis Date Noted    Insomnia 2019    Psychosis, paranoid ([facility] 2018    Cannabis use disorder, moderate, dependence ([facility] 2018    History of sexually transmitted disease 2018    Depressive disorder 2018    Pulmonary embolism without acute cor pulmonale ([facility] 2017    Anxiety 2017    Menorrhagia with irregular cycle     History of  delivery 2016    Tobacco use disorder 2015     Past Medical History:   Diagnosis Date    Anxiety 2018    Depressive disorder 2018    History of pulmonary embolism 2017    implanted birth control?     Psychosis ([facility] 2018    Smoker 2012     Past Surgical History:   Procedure Laterality Date     SECTION  11/28/2011    X2, 2017 and      SECTION  2017    CHOLECYSTECTOMY, LAPAROSCOPIC  2017    ROBOTIC XI ASSISTED LAPAROSCOPIC CHOLECYSTECTOMY WITH CHOLANGIOGRAM     Admission on 2019, Discharged on 2019   Component Date Value Ref Range Status    Color, UA 2019 DK YELLOW  Straw/Yellow Final    Clarity, UA 2019 TURBID* Clear Final    Glucose, Ur 2019 Negative  Negative mg/dL Final    Bilirubin Urine 2019 SMALL* Negative Final    Ketones, Urine 2019 Negative  Negative mg/dL Final    Specific Gravity, UA 2019 1.029  1.005 - 1.030 Final    Blood, Urine 2019 Negative  Negative Final    pH, UA 2019 6.0  5.0 - 8.0 Final    Protein, UA 2019 TRACE* Negative mg/dL Final    Urobilinogen, Urine 2019 1.0  <2.0 E.U./dL Final    Nitrite, Urine 2019 Negative  Negative Final    Leukocyte Esterase, Urine 2019 SMALL* Negative Final    Microscopic Examination 2019 YES   Final    Urine Reflex to Culture 2019 Yes   Final    Urine Type 2019 Not Specified   Final    HCG(Urine) Pregnancy Test 2019 Negative  Detects HCG level >20 MIU/mL Final    Comment: Note:  False Negative pregnancy results have been reported in  early pregnancy due to insufficient amounts of hCG and in late  first trimester pregnancies, though very rare, due to the hook  effect. Always repeat results in question with a serum  quantitative pregnancy test. A serum hCG is positive 2-5 days  before the urine pregnancy test.      WBC 01/25/2019 4.7  4.0 - 11.0 K/uL Final    RBC 01/25/2019 4.91  4.00 - 5.20 M/uL Final    Hemoglobin 01/25/2019 14.5  12.0 - 16.0 g/dL Final    Hematocrit 01/25/2019 42.6  36.0 - 48.0 % Final    MCV 01/25/2019 86.7  80.0 - 100.0 fL Final    MCH 01/25/2019 29.4  26.0 - 34.0 pg Final    MCHC 01/25/2019 33.9  31.0 - 36.0 g/dL Final    RDW 01/25/2019 15.4  12.4 - 15.4 % Final    Platelets 76/24/3144 198  135 - 450 K/uL Final    MPV 01/25/2019 8.2  5.0 - 10.5 fL Final    Neutrophils % 01/25/2019 57.3  % Final    Lymphocytes % 01/25/2019 23.5  % Final    Monocytes % 01/25/2019 16.4  % Final    Eosinophils % 01/25/2019 2.1  % Final    Basophils % 01/25/2019 0.7  % Final    Neutrophils # 01/25/2019 2.7  1.7 - 7.7 K/uL Final    Lymphocytes # 01/25/2019 1.1  1.0 - 5.1 K/uL Final    Monocytes # 01/25/2019 0.8  0.0 - 1.3 K/uL Final    Eosinophils # 01/25/2019 0.1  0.0 - 0.6 K/uL Final    Basophils # 01/25/2019 0.0  0.0 - 0.2 K/uL Final    Sodium 01/25/2019 139  136 - 145 mmol/L Final    Potassium 01/25/2019 3.8  3.5 - 5.1 mmol/L Final    Chloride 01/25/2019 104  99 - 110 mmol/L Final    CO2 01/25/2019 25  21 - 32 mmol/L Final    Anion Gap 01/25/2019 10  3 - 16 Final    Glucose 01/25/2019 89  70 - 99 mg/dL Final    BUN 01/25/2019 9  7 - 20 mg/dL Final    CREATININE 01/25/2019 0.6  0.6 - 1.1 mg/dL Final    GFR Non- 01/25/2019 >60  >60 Final    Comment: >60 mL/min/1.73m2 EGFR, calc. for ages 25 and older using the  MDRD formula (not corrected for weight), is valid for stable  renal function.       GFR  01/25/2019 >60  >60 Final    Comment: Chronic Kidney Disease: less than 60 ml/min/1.73 sq.m. Kidney Failure: less than 15 ml/min/1.73 sq.m. Results valid for patients 18 years and older.  Calcium 01/25/2019 8.6  8.3 - 10.6 mg/dL Final    Total Protein 01/25/2019 6.8  6.4 - 8.2 g/dL Final    Alb 01/25/2019 4.4  3.4 - 5.0 g/dL Final    Albumin/Globulin Ratio 01/25/2019 1.8  1.1 - 2.2 Final    Total Bilirubin 01/25/2019 0.6  0.0 - 1.0 mg/dL Final    Alkaline Phosphatase 01/25/2019 48  40 - 129 U/L Final    ALT 01/25/2019 26  10 - 40 U/L Final    AST 01/25/2019 20  15 - 37 U/L Final    Globulin 01/25/2019 2.4  g/dL Final    Lipase 01/25/2019 12.0* 13.0 - 60.0 U/L Final    Trichomonas Prep 01/25/2019 None Seen   Final    Yeast, Wet Prep 01/25/2019 None Seen   Final    Clue Cells, Wet Prep 01/25/2019 None Seen   Final    WBC, Wet Prep 01/25/2019 1+   Final    RBC, Wet Prep 01/25/2019 None Seen   Final    Epi Cells 01/25/2019 1+   Final    Bacteria 01/25/2019 4+   Final    Source Wet Prep 01/25/2019 Vaginal   Final    C. trachomatis DNA 01/25/2019 Negative  Negative Final    N. gonorrhoeae DNA 01/25/2019 Negative  Negative Final    hCG Quant 01/25/2019 <5.0  <5.0 mIU/mL Final    Comment: Male: <5.0 mIU/ml    Pregnant:  Note:  HCG Interpretation is based on gestational age vs. LMP. Gestational Age          Expected HCG values (mIU/ml)  0.2-1 week                            5-50    1-2 weeks                             2-3 weeks                       100-5000    3-4 weeks                     500-10,000    4-5 weeks                    1000-50,000    5-6 weeks                 10,000-100,000    6-8 weeks                 15,000-200,000    2-3 months                10,000-100,000      Urine Culture, Routine 01/25/2019 <10,000 CFU/ml mixed skin/urogenital jorden.  No further workup   Final    RBC, UA 01/25/2019 5-10* 0 - 2 /HPF Final    Bacteria, UA 01/25/2019 1+* /HPF Final    WBC, UA 01/25/2019 117* 0 - 5 /HPF Final    Epi Cells 01/25/2019 7* 0 - 5 /HPF Final    Comment: Urinalysis microscopic performed using the  automated methodology (AUWI analyzer). Family History   Problem Relation Age of Onset    Hypertension Father     Hypertension Mother     Bipolar Disorder Mother     Anxiety Disorder Mother     Depression Mother     Depression Sister     Depression Brother      Current Outpatient Medications   Medication Sig Dispense Refill    cariprazine hcl (VRAYLAR) 1.5 MG capsule Take 1 capsule by mouth daily For 4 days then increase to 2 tabs daily 60 capsule 1    clonazePAM (KLONOPIN) 0.5 MG tablet Take 0.5 mg by mouth 2 times daily as needed. No current facility-administered medications for this visit. Allergies   Allergen Reactions    Etonogestrel      Other reaction(s): Other (See Comments)  Per [facility] may have contributed to PE in 2017    Morphine Itching, Rash and Hives    Naprosyn [Naproxen] Hives, Swelling and Rash       Review of Systems   Constitutional: Positive for fatigue. Negative for chills and fever. Respiratory: Positive for chest tightness and shortness of breath. Negative for cough. Cardiovascular: Positive for chest pain. Negative for palpitations. Gastrointestinal: Positive for diarrhea and nausea. Negative for abdominal pain and vomiting. Neurological: Positive for tremors and light-headedness. Negative for syncope. Psychiatric/Behavioral: Positive for decreased concentration, dysphoric mood and sleep disturbance. Negative for self-injury and suicidal ideas. The patient is nervous/anxious. Objective:  /80 (Site: Right Upper Arm, Position: Sitting, Cuff Size: Medium Adult)   Temp 98.4 °F (36.9 °C) (Oral)   Ht 5' 4\" (1.626 m)   Wt 163 lb (73.9 kg) Comment: SHOES ON  LMP 04/05/2019   Breastfeeding? No   BMI 27.98 kg/m²     Physical Exam   Constitutional: She is oriented to person, place, and time. She appears well-developed and well-nourished.  No

## 2019-04-29 ENCOUNTER — TELEPHONE (OUTPATIENT)
Dept: INTERNAL MEDICINE CLINIC | Age: 24
End: 2019-04-29

## 2019-05-01 ENCOUNTER — OFFICE VISIT (OUTPATIENT)
Dept: ENT CLINIC | Age: 24
End: 2019-05-01
Payer: MEDICAID

## 2019-05-01 VITALS
DIASTOLIC BLOOD PRESSURE: 73 MMHG | WEIGHT: 161 LBS | BODY MASS INDEX: 27.64 KG/M2 | HEART RATE: 81 BPM | SYSTOLIC BLOOD PRESSURE: 114 MMHG | TEMPERATURE: 97.3 F

## 2019-05-01 DIAGNOSIS — T16.1XXS: Primary | ICD-10-CM

## 2019-05-01 PROCEDURE — 99212 OFFICE O/P EST SF 10 MIN: CPT | Performed by: OTOLARYNGOLOGY

## 2019-05-01 ASSESSMENT — ENCOUNTER SYMPTOMS
SHORTNESS OF BREATH: 0
APNEA: 0
EYE REDNESS: 0
WHEEZING: 0
CHEST TIGHTNESS: 0
COUGH: 0
VOMITING: 0
BACK PAIN: 0
BLOOD IN STOOL: 0
VOICE CHANGE: 0
RHINORRHEA: 0
EYE ITCHING: 0
NAUSEA: 0
STRIDOR: 0
SINUS PAIN: 0
FACIAL SWELLING: 0
CHOKING: 0
COLOR CHANGE: 0
TROUBLE SWALLOWING: 0
CONSTIPATION: 0
SINUS PRESSURE: 0
DIARRHEA: 0
SORE THROAT: 0
EYE PAIN: 0
EYE DISCHARGE: 0

## 2019-05-01 NOTE — PROGRESS NOTES
Subjective:      Patient ID: Cassie Ayala is a 21 y.o. female. HPI  Hearing Loss HPI  CC: Right ear    General: This is a pleasant 21year old female with a threeweek follow up from [facility] right ear. Still otalgia and hearing loss but much improved. No otorrhea. Using cortisporin. No vertigo. Patient Active Problem List   Diagnosis    History of  delivery    Menorrhagia with irregular cycle    Pulmonary embolism without acute cor pulmonale (HCC)    Cannabis use disorder, moderate, dependence ([address]    Psychosis, paranoid ([address]    Tobacco use disorder    History of sexually transmitted disease    Depressive disorder    Anxiety    Insomnia     Past Surgical History:   Procedure Laterality Date     SECTION  11/28/2011    X2,  and      SECTION  2017    CHOLECYSTECTOMY, LAPAROSCOPIC  2017    ROBOTIC XI ASSISTED LAPAROSCOPIC CHOLECYSTECTOMY WITH CHOLANGIOGRAM     Family History   Problem Relation Age of Onset    Hypertension Father     Hypertension Mother    [place] Bipolar Disorder Mother     Anxiety Disorder Mother     Depression Mother     Depression Sister     Depression Brother      Social History     Socioeconomic History    Marital status: Single     Spouse name: Not on file    Number of children: 2    Years of education: Not on file    Highest education level: Not on file   Occupational History    Not on file   Social Needs    Financial resource strain: Not on file    Food insecurity:     Worry: Not on file     Inability: Not on file    Transportation needs:     Medical: Not on file     Non-medical: Not on file   Tobacco Use    Smoking status: Current Every Day Smoker     Packs/day: 1.00     Years: 7.00     Pack years: 7.00     Types: Cigarettes    Smokeless tobacco: Never Used    Tobacco comment: off and on since Feb   Substance and Sexual Activity    Alcohol use: Yes     Comment: STATES DRINKS A [address] FEELS.  Drug use: Not on file     Comment: occassionally    Sexual activity: Not Currently     Partners: Male   Lifestyle    Physical activity:     Days per week: Not on file     Minutes per session: Not on file    Stress: Not on file   Relationships    Social connections:     Talks on phone: Not on file     Gets together: Not on file     Attends [employer] service: Not on file     Active member of club or organization: Not on file     Attends meetings of clubs or organizations: Not on file     Relationship status: Not on file    Intimate partner violence:     Fear of current or ex partner: Not on file     Emotionally abused: Not on file     Physically abused: Not on file     Forced sexual activity: Not on file   Other Topics Concern    Not on file   Social History Narrative    Not on file       Review of Systems   Constitutional: Negative for activity change, appetite change, chills, fatigue and fever. HENT: Positive for ear pain and hearing loss. Negative for congestion, dental problem, drooling, ear discharge, facial swelling, mouth sores, nosebleeds, postnasal drip, rhinorrhea, sinus pressure, sinus pain, sneezing, sore throat, tinnitus, trouble swallowing and voice change. Eyes: Negative for pain, discharge, redness, itching and visual disturbance. Respiratory: Negative for apnea, cough, choking, chest tightness, shortness of breath, wheezing and stridor. Cardiovascular: Negative for palpitations. Gastrointestinal: Negative for blood in stool, constipation, diarrhea, nausea and vomiting. Endocrine: Negative for cold intolerance, heat intolerance, polydipsia, polyphagia and polyuria. Musculoskeletal: Negative for back pain, gait problem, neck pain and neck stiffness. Skin: Negative for color change, pallor, rash and wound. Allergic/Immunologic: Negative for environmental allergies, food allergies and immunocompromised state.    Neurological: Negative for dizziness, facial asymmetry, speech difficulty, light-headedness, numbness and headaches. Hematological: Negative for adenopathy. Does not bruise/bleed easily. Psychiatric/Behavioral: Negative for agitation, confusion, self-injury and sleep disturbance. The patient is not nervous/anxious. Objective:   Physical Exam   Constitutional: She is oriented to person, place, and time. She appears well-developed and well-nourished. No distress. HENT:   Head: Not macrocephalic and not microcephalic. Head is without Torrez's sign, without contusion, without laceration, without right periorbital erythema and without left periorbital erythema. Right Ear: Hearing, tympanic membrane, external ear and ear canal normal. No lacerations. No drainage, swelling or tenderness. No foreign bodies. No mastoid tenderness. Tympanic membrane is not injected, not perforated, not retracted and not bulging. Tympanic membrane mobility is normal. No middle ear effusion. No hemotympanum. No decreased hearing is noted. Left Ear: Hearing, tympanic membrane, external ear and ear canal normal. No lacerations. No drainage, swelling or tenderness. No foreign bodies. No mastoid tenderness. Tympanic membrane is not injected, not perforated, not retracted and not bulging. Tympanic membrane mobility is normal.  No middle ear effusion. No hemotympanum. No decreased hearing is noted. Ears:    Nose: No mucosal edema or rhinorrhea. Mouth/Throat: No oropharyngeal exudate, posterior oropharyngeal edema, posterior oropharyngeal erythema or tonsillar abscesses. Eyes: Right eye exhibits normal extraocular motion and no nystagmus. Left eye exhibits normal extraocular motion and no nystagmus. Right pupil is reactive. Left pupil is reactive. Neck: Normal range of motion. Neck supple. No thyroid mass and no thyromegaly present. Lymphadenopathy:        Head (right side): No preauricular, no posterior auricular and no occipital adenopathy present. Head (left side):  No

## 2019-05-03 ENCOUNTER — TELEPHONE (OUTPATIENT)
Dept: FAMILY MEDICINE CLINIC | Age: 24
End: 2019-05-03

## 2019-05-08 ENCOUNTER — OFFICE VISIT (OUTPATIENT)
Dept: FAMILY MEDICINE CLINIC | Age: 24
End: 2019-05-08

## 2019-05-08 VITALS
DIASTOLIC BLOOD PRESSURE: 72 MMHG | BODY MASS INDEX: 27.18 KG/M2 | HEART RATE: 74 BPM | OXYGEN SATURATION: 98 % | SYSTOLIC BLOOD PRESSURE: 126 MMHG | HEIGHT: 64 IN | WEIGHT: 159.2 LBS

## 2019-05-08 DIAGNOSIS — R03.0 ELEVATED BLOOD PRESSURE READING: ICD-10-CM

## 2019-05-08 DIAGNOSIS — G43.919 INTRACTABLE MIGRAINE WITHOUT STATUS MIGRAINOSUS, UNSPECIFIED MIGRAINE TYPE: ICD-10-CM

## 2019-05-08 DIAGNOSIS — F41.9 ANXIETY: Primary | ICD-10-CM

## 2019-05-08 LAB
A/G RATIO: 2.1 (ref 1.1–2.2)
ALBUMIN SERPL-MCNC: 4.8 G/DL (ref 3.4–5)
ALP BLD-CCNC: 52 U/L (ref 40–129)
ALT SERPL-CCNC: 14 U/L (ref 10–40)
ANION GAP SERPL CALCULATED.3IONS-SCNC: 11 MMOL/L (ref 3–16)
AST SERPL-CCNC: 15 U/L (ref 15–37)
BASOPHILS ABSOLUTE: 0.1 K/UL (ref 0–0.2)
BASOPHILS RELATIVE PERCENT: 1 %
BILIRUB SERPL-MCNC: 0.9 MG/DL (ref 0–1)
BUN BLDV-MCNC: 9 MG/DL (ref 7–20)
CALCIUM SERPL-MCNC: 10 MG/DL (ref 8.3–10.6)
CHLORIDE BLD-SCNC: 104 MMOL/L (ref 99–110)
CHOLESTEROL, TOTAL: 144 MG/DL (ref 0–199)
CO2: 26 MMOL/L (ref 21–32)
CREAT SERPL-MCNC: 0.8 MG/DL (ref 0.6–1.1)
EOSINOPHILS ABSOLUTE: 0.1 K/UL (ref 0–0.6)
EOSINOPHILS RELATIVE PERCENT: 2.4 %
GFR AFRICAN AMERICAN: >60
GFR NON-AFRICAN AMERICAN: >60
GLOBULIN: 2.3 G/DL
GLUCOSE BLD-MCNC: 89 MG/DL (ref 70–99)
HCT VFR BLD CALC: 46 % (ref 36–48)
HDLC SERPL-MCNC: 66 MG/DL (ref 40–60)
HEMOGLOBIN: 15.4 G/DL (ref 12–16)
LDL CHOLESTEROL CALCULATED: 66 MG/DL
LYMPHOCYTES ABSOLUTE: 1.7 K/UL (ref 1–5.1)
LYMPHOCYTES RELATIVE PERCENT: 31.5 %
MCH RBC QN AUTO: 30.5 PG (ref 26–34)
MCHC RBC AUTO-ENTMCNC: 33.6 G/DL (ref 31–36)
MCV RBC AUTO: 90.7 FL (ref 80–100)
MONOCYTES ABSOLUTE: 0.4 K/UL (ref 0–1.3)
MONOCYTES RELATIVE PERCENT: 8.2 %
NEUTROPHILS ABSOLUTE: 3 K/UL (ref 1.7–7.7)
NEUTROPHILS RELATIVE PERCENT: 56.9 %
PDW BLD-RTO: 13.7 % (ref 12.4–15.4)
PLATELET # BLD: 248 K/UL (ref 135–450)
PMV BLD AUTO: 9.7 FL (ref 5–10.5)
POTASSIUM SERPL-SCNC: 4.4 MMOL/L (ref 3.5–5.1)
RBC # BLD: 5.07 M/UL (ref 4–5.2)
SODIUM BLD-SCNC: 141 MMOL/L (ref 136–145)
TOTAL PROTEIN: 7.1 G/DL (ref 6.4–8.2)
TRIGL SERPL-MCNC: 60 MG/DL (ref 0–150)
TSH REFLEX: 0.96 UIU/ML (ref 0.27–4.2)
VLDLC SERPL CALC-MCNC: 12 MG/DL
WBC # BLD: 5.3 K/UL (ref 4–11)

## 2019-05-08 PROCEDURE — 36415 COLL VENOUS BLD VENIPUNCTURE: CPT | Performed by: FAMILY MEDICINE

## 2019-05-08 PROCEDURE — 99214 OFFICE O/P EST MOD 30 MIN: CPT | Performed by: FAMILY MEDICINE

## 2019-05-08 RX ORDER — CLONAZEPAM 0.5 MG/1
0.5 TABLET ORAL 2 TIMES DAILY PRN
Qty: 60 TABLET | Refills: 0 | Status: SHIPPED | OUTPATIENT
Start: 2019-05-08 | End: 2019-10-09 | Stop reason: SDUPTHER

## 2019-05-08 RX ORDER — PROPRANOLOL HCL 60 MG
60 CAPSULE, EXTENDED RELEASE 24HR ORAL NIGHTLY
Qty: 30 CAPSULE | Refills: 2 | Status: SHIPPED | OUTPATIENT
Start: 2019-05-08 | End: 2019-12-10

## 2019-05-08 ASSESSMENT — ENCOUNTER SYMPTOMS
WHEEZING: 0
EYE PAIN: 0
EYE REDNESS: 0
NAUSEA: 1
PHOTOPHOBIA: 1
CHEST TIGHTNESS: 1
COUGH: 0

## 2019-05-08 NOTE — PATIENT INSTRUCTIONS
Patient Education        Recurring Migraine Headache: Care Instructions  Your Care Instructions  Migraines are painful, throbbing headaches. They often start on one side of the head. They may cause nausea and vomiting and make you sensitive to light, sound, or smell. Some people may have only a few migraines throughout life. Others have them as often as several times a month. The goal of treatment is to reduce the number of migraines you have and relieve your symptoms. Even with treatment, you may continue to have migraines. You play an important role in dealing with your headaches. Work on avoiding things that seem to trigger your migraines. When you feel a headache coming on, act quickly to stop it before it gets worse. Follow-up care is a key part of your treatment and safety. Be sure to make and go to all appointments, and call your doctor if you are having problems. It's also a good idea to know your test results and keep a list of the medicines you take. How can you care for yourself at home? · Do not drive if you have taken a prescription pain medicine. · Rest in a quiet, dark room until your headache is gone. Close your eyes and try to relax or go to sleep. Do not watch TV or read. · Put a cold, moist cloth or cold pack on the painful area for 10 to 20 minutes at a time. Put a thin cloth between the cold pack and your skin. · Have someone gently massage your neck and shoulders. · Take your medicines exactly as prescribed. Call your doctor if you think you are having a problem with your medicine. You will get more details on the specific medicines your doctor prescribes. To prevent migraines  · Keep a headache diary so you can figure out what triggers your headaches. Avoiding triggers may help you prevent headaches. Record when each headache began, how long it lasted, and what the pain was like. Use words like throbbing, aching, stabbing, or dull.  Write down any other symptoms you had with the stop-smoking programs and medicines. These can increase your chances of quitting for good. · If you are taking birth control pills or hormone therapy, talk to your doctor about whether they are triggering your migraines. When should you call for help? Call 911 anytime you think you may need emergency care. For example, call if:    · You have symptoms of a stroke. These may include:  ? Sudden numbness, tingling, weakness, or loss of movement in your face, arm, or leg, especially on only one side of your body. ? Sudden vision changes. ? Sudden trouble speaking. ? Sudden confusion or trouble understanding simple statements. ? Sudden problems with walking or balance. ? A sudden, severe headache that is different from past headaches.    Call your doctor now or seek immediate medical care if:    · You develop a fever and a stiff neck.     · You have new nausea and vomiting, or you cannot keep down food or liquids.    Watch closely for changes in your health, and be sure to contact your doctor if:    · You have a headache that does not get better within 1 or 2 days.     · Your headaches get worse or happen more often. Where can you learn more? Go to [email or web address] org and sign in to your [employer] account. Enter  in the Kaiam box to learn more about \"Recurring Migraine Headache: Care Instructions. \"     If you do not have an account, please click on the \"Sign Up Now\" link. Current as of: Sonam 3, 2018  Content Version: 12.0  © 4909-5149 Healthwise, Incorporated. Care instructions adapted under license by [facility] ([facility]). If you have questions about a medical condition or this instruction, always ask your healthcare professional. [place] [address] any warranty or liability for your use of this information.

## 2019-05-08 NOTE — PROGRESS NOTES
[facility]  Clinic Note    Date: 5/8/2019                                               Subjective:     Chief Complaint   Patient presents with    Manic Behavior     ROUTINE FOLLOW UP- WAS NOT ABLE TO GET MED, STILL NEEDING PA.     Discuss Labs     PT IS FASTING TODAY TO HAVE FUTURE LABS DRAWN.  Hypertension     DISCUSS HTN- HAS BEEN CHECKING BP AT HOME AND GETTING IN THE 150S-170S OVER 90S. STATES ALSO GETTING DIZZINESS, HEADACHES, AND HOT FLASHES. HPI  Rx'd Modesta Vinson at lat visit, not able to get it approved. Read side effects and concerned as well. Sees Soy Sanders June 24th - going out of town, to [place] in near future. Asks about having supply of Klonopin in the meantime, has been a long time since she has traveled anywhere. States BP has been high when anxiety is high, stressed, can feel her face and chest get red, feels dizzy, headaches \"thru the roof\", ibupfofen doesn't help, drinking OJ sometimes does. Taking BP at aunt's house (lives with her). Tries to relax and 30 min later elevated but them goes back to normal.  Head \"pounds 24/7\", has HA now. Worries about everything, stresses about everything. Eats one time per day, losing weight again. We had discussed Gene Sight testing and she wishes to persue that now. Wt Readings from Last 3 Encounters:   05/08/19 159 lb 3.2 oz (72.2 kg)   05/01/19 161 lb (73 kg)   04/24/19 163 lb (73.9 kg)       From visit on April 24th:  \"Psychotic break\" Feb 2018, didn't sleep for 5 days, walking streets for 3 days, family filed a missing person report with the police, states was found downtown, felt as if she was being followed, everyone out to get her, kept at [facility] for 32 days for work up. While there, told she was schizophrenic and biploar, which she does not agree with the schizophrenia diagnosis. Reports saw counsellor for about 6 months. Hospital had her on medication Cogentin and Prolixin which made her sleep 18 hours per day.    Has 2 kids, doesn't have custody, 1 with dad, 1 with GM. Feels numb to pain, anxiety thru the roof. Has been tried on multiple meds, recalls Wellbutrin (not effective), zoloft, lexapro, celexa, cymbalta, seroquel (zombie). Saw psych at beginning of year, referral from  (was living in [place]), gained a lot of weight (50 pounds per pt) on cogentin and prolixin. Rx'd Klonopin and Ambien as she \"doesn't sleep\". Agreeable to referral to our psych NP, hesitant to try another med in meantime but did accept rx. Asks about med that will work quickly. Asks about genetic testing as well. Suggested waiting until full eval by psych for certain diagnosis. Mother does have anxiety, depression, bipolar.    Past couple of days feels dark, no intentions of hurting self but severe anxiety, chest always hurts, can't focus, feels as if having a heart attack. Lives with aunt, working full time. Patient Active Problem List    Diagnosis Date Noted    Insomnia 2019    Psychosis, paranoid ([address] 2018    Cannabis use disorder, moderate, dependence ([address] 2018    History of sexually transmitted disease 2018    Depressive disorder 2018    Pulmonary embolism without acute cor pulmonale ([address] 2017    Anxiety 2017    Menorrhagia with irregular cycle     History of  delivery 2016    Tobacco use disorder 2015     Past Medical History:   Diagnosis Date    Anxiety 2018    Depressive disorder 2018    History of pulmonary embolism 2017    implanted birth control?     Psychosis ([address] 2018    Smoker      Past Surgical History:   Procedure Laterality Date     SECTION  11/28/2011    X2, 2017 and 2011     SECTION  2017    CHOLECYSTECTOMY, LAPAROSCOPIC  2017    ROBOTIC XI ASSISTED LAPAROSCOPIC CHOLECYSTECTOMY WITH CHOLANGIOGRAM     Admission on 2019, Discharged on 2019   Component Date Value Ref Range Status Final    Neutrophils # 01/25/2019 2.7  1.7 - 7.7 K/uL Final    Lymphocytes # 01/25/2019 1.1  1.0 - 5.1 K/uL Final    Monocytes # 01/25/2019 0.8  0.0 - 1.3 K/uL Final    Eosinophils # 01/25/2019 0.1  0.0 - 0.6 K/uL Final    Basophils # 01/25/2019 0.0  0.0 - 0.2 K/uL Final    Sodium 01/25/2019 139  136 - 145 mmol/L Final    Potassium 01/25/2019 3.8  3.5 - 5.1 mmol/L Final    Chloride 01/25/2019 104  99 - 110 mmol/L Final    CO2 01/25/2019 25  21 - 32 mmol/L Final    Anion Gap 01/25/2019 10  3 - 16 Final    Glucose 01/25/2019 89  70 - 99 mg/dL Final    BUN 01/25/2019 9  7 - 20 mg/dL Final    CREATININE 01/25/2019 0.6  0.6 - 1.1 mg/dL Final    GFR Non- 01/25/2019 >60  >60 Final    Comment: >60 mL/min/1.73m2 EGFR, calc. for ages 25 and older using the  MDRD formula (not corrected for weight), is valid for stable  renal function.  GFR  01/25/2019 >60  >60 Final    Comment: Chronic Kidney Disease: less than 60 ml/min/1.73 sq.m. Kidney Failure: less than 15 ml/min/1.73 sq.m. Results valid for patients 18 years and older.       Calcium 01/25/2019 8.6  8.3 - 10.6 mg/dL Final    Total Protein 01/25/2019 6.8  6.4 - 8.2 g/dL Final    Alb 01/25/2019 4.4  3.4 - 5.0 g/dL Final    Albumin/Globulin Ratio 01/25/2019 1.8  1.1 - 2.2 Final    Total Bilirubin 01/25/2019 0.6  0.0 - 1.0 mg/dL Final    Alkaline Phosphatase 01/25/2019 48  40 - 129 U/L Final    ALT 01/25/2019 26  10 - 40 U/L Final    AST 01/25/2019 20  15 - 37 U/L Final    Globulin 01/25/2019 2.4  g/dL Final    Lipase 01/25/2019 12.0* 13.0 - 60.0 U/L Final    Trichomonas Prep 01/25/2019 None Seen   Final    Yeast, Wet Prep 01/25/2019 None Seen   Final    Clue Cells, Wet Prep 01/25/2019 None Seen   Final    WBC, Wet Prep 01/25/2019 1+   Final    RBC, Wet Prep 01/25/2019 None Seen   Final    Epi Cells 01/25/2019 1+   Final    Bacteria 01/25/2019 4+   Final    Source Wet Prep 01/25/2019 Vaginal   Final  C. trachomatis DNA 01/25/2019 Negative  Negative Final    N. gonorrhoeae DNA 01/25/2019 Negative  Negative Final    hCG Quant 01/25/2019 <5.0  <5.0 mIU/mL Final    Comment: Male: <5.0 mIU/ml    Pregnant:  Note:  HCG Interpretation is based on gestational age vs. LMP. Gestational Age          Expected HCG values (mIU/ml)  0.2-1 week                            5-50    1-2 weeks                             2-3 weeks                       100-5000    3-4 weeks                     500-10,000    4-5 weeks                    1000-50,000    5-6 weeks                 10,000-100,000    6-8 weeks                 15,000-200,000    2-3 months                10,000-100,000      Urine Culture, Routine 01/25/2019 <10,000 CFU/ml mixed skin/urogenital jorden. No further workup   Final    RBC, UA 01/25/2019 5-10* 0 - 2 /HPF Final    Bacteria, UA 01/25/2019 1+* /HPF Final    WBC, UA 01/25/2019 117* 0 - 5 /HPF Final    Epi Cells 01/25/2019 7* 0 - 5 /HPF Final    Comment: Urinalysis microscopic performed using the  automated methodology (AUWI analyzer). Family History   Problem Relation Age of Onset    Hypertension Father     Hypertension Mother     Bipolar Disorder Mother     Anxiety Disorder Mother     Depression Mother     Depression Sister     Depression Brother      Current Outpatient Medications   Medication Sig Dispense Refill    clonazePAM (KLONOPIN) 0.5 MG tablet Take 1 tablet by mouth 2 times daily as needed for Anxiety for up to 30 days. 60 tablet 0    propranolol (INDERAL LA) 60 MG extended release capsule Take 1 capsule by mouth nightly 30 capsule 2     No current facility-administered medications for this visit. Allergies   Allergen Reactions    Etonogestrel      Other reaction(s):  Other (See Comments)  Per [facility] ([facility]), may have contributed to PE in 2017    Morphine Itching, Rash and Hives    Naprosyn [Naproxen] Hives, Swelling and Rash       Review of Systems   Constitutional: status migrainosus, unspecified migraine type  -     propranolol (INDERAL LA) 60 MG extended release capsule; Take 1 capsule by mouth nightly    Elevated blood pressure reading    Fasting labs today  Gene sight test today  Keep appt with psych  Refilled Klonopin short term, rev'd OARRS  Trial of Inderal for migraines and BP    Return in about 1 month (around 6/8/2019), or follow up migraines, propranolol.     [address] [address]  5/8/2019  12:58 PM

## 2019-08-13 ENCOUNTER — HOSPITAL ENCOUNTER (EMERGENCY)
Age: 24
Discharge: HOME | End: 2019-08-13
Payer: MEDICARE

## 2019-08-13 VITALS
DIASTOLIC BLOOD PRESSURE: 74 MMHG | WEIGHT: 145.72 LBS | BODY MASS INDEX: 24.88 KG/M2 | HEART RATE: 83 BPM | SYSTOLIC BLOOD PRESSURE: 123 MMHG | OXYGEN SATURATION: 100 % | TEMPERATURE: 98.1 F | HEIGHT: 64 IN | RESPIRATION RATE: 16 BRPM

## 2019-08-13 DIAGNOSIS — N93.8 DUB (DYSFUNCTIONAL UTERINE BLEEDING): ICD-10-CM

## 2019-08-13 DIAGNOSIS — N94.6 DYSMENORRHEA: Primary | ICD-10-CM

## 2019-08-13 LAB
ABO/RH: NORMAL
ANION GAP SERPL CALCULATED.3IONS-SCNC: 12 MMOL/L (ref 3–16)
ANTIBODY SCREEN: NORMAL
BASOPHILS ABSOLUTE: 0 K/UL (ref 0–0.2)
BASOPHILS RELATIVE PERCENT: 0.9 %
BILIRUBIN URINE: NEGATIVE
BLOOD, URINE: ABNORMAL
BUN BLDV-MCNC: 9 MG/DL (ref 7–20)
CALCIUM SERPL-MCNC: 9.6 MG/DL (ref 8.3–10.6)
CHLORIDE BLD-SCNC: 106 MMOL/L (ref 99–110)
CLARITY: ABNORMAL
CO2: 25 MMOL/L (ref 21–32)
COLOR: YELLOW
CREAT SERPL-MCNC: 0.5 MG/DL (ref 0.6–1.1)
EOSINOPHILS ABSOLUTE: 0.2 K/UL (ref 0–0.6)
EOSINOPHILS RELATIVE PERCENT: 3.4 %
EPITHELIAL CELLS, UA: 3 /HPF (ref 0–5)
GFR AFRICAN AMERICAN: >60
GFR NON-AFRICAN AMERICAN: >60
GLUCOSE BLD-MCNC: 100 MG/DL (ref 70–99)
GLUCOSE URINE: NEGATIVE MG/DL
GONADOTROPIN, CHORIONIC (HCG) QUANT: <5 MIU/ML
HCT VFR BLD CALC: 43.5 % (ref 36–48)
HEMOGLOBIN: 15 G/DL (ref 12–16)
HYALINE CASTS: 0 /LPF (ref 0–8)
KETONES, URINE: NEGATIVE MG/DL
LEUKOCYTE ESTERASE, URINE: NEGATIVE
LYMPHOCYTES ABSOLUTE: 1.6 K/UL (ref 1–5.1)
LYMPHOCYTES RELATIVE PERCENT: 34.3 %
MCH RBC QN AUTO: 32.2 PG (ref 26–34)
MCHC RBC AUTO-ENTMCNC: 34.4 G/DL (ref 31–36)
MCV RBC AUTO: 93.5 FL (ref 80–100)
MICROSCOPIC EXAMINATION: YES
MONOCYTES ABSOLUTE: 0.4 K/UL (ref 0–1.3)
MONOCYTES RELATIVE PERCENT: 9 %
NEUTROPHILS ABSOLUTE: 2.4 K/UL (ref 1.7–7.7)
NEUTROPHILS RELATIVE PERCENT: 52.4 %
NITRITE, URINE: NEGATIVE
PDW BLD-RTO: 13.8 % (ref 12.4–15.4)
PH UA: 8 (ref 5–8)
PLATELET # BLD: 158 K/UL (ref 135–450)
PMV BLD AUTO: 9.3 FL (ref 5–10.5)
POTASSIUM REFLEX MAGNESIUM: 4.1 MMOL/L (ref 3.5–5.1)
PROTEIN UA: NEGATIVE MG/DL
RBC # BLD: 4.65 M/UL (ref 4–5.2)
RBC UA: 1 /HPF (ref 0–4)
SODIUM BLD-SCNC: 143 MMOL/L (ref 136–145)
SPECIFIC GRAVITY UA: 1.01 (ref 1–1.03)
URINE REFLEX TO CULTURE: ABNORMAL
URINE TYPE: ABNORMAL
UROBILINOGEN, URINE: 0.2 E.U./DL
WBC # BLD: 4.6 K/UL (ref 4–11)
WBC UA: 1 /HPF (ref 0–5)

## 2019-08-13 PROCEDURE — 80048 BASIC METABOLIC PNL TOTAL CA: CPT

## 2019-08-13 PROCEDURE — 96375 TX/PRO/DX INJ NEW DRUG ADDON: CPT

## 2019-08-13 PROCEDURE — 96376 TX/PRO/DX INJ SAME DRUG ADON: CPT

## 2019-08-13 PROCEDURE — 6360000002 HC RX W HCPCS: Performed by: PHYSICIAN ASSISTANT

## 2019-08-13 PROCEDURE — 6370000000 HC RX 637 (ALT 250 FOR IP): Performed by: PHYSICIAN ASSISTANT

## 2019-08-13 PROCEDURE — 81001 URINALYSIS AUTO W/SCOPE: CPT

## 2019-08-13 PROCEDURE — 84702 CHORIONIC GONADOTROPIN TEST: CPT

## 2019-08-13 PROCEDURE — 96374 THER/PROPH/DIAG INJ IV PUSH: CPT

## 2019-08-13 PROCEDURE — 86850 RBC ANTIBODY SCREEN: CPT

## 2019-08-13 PROCEDURE — 86900 BLOOD TYPING SEROLOGIC ABO: CPT

## 2019-08-13 PROCEDURE — 99284 EMERGENCY DEPT VISIT MOD MDM: CPT

## 2019-08-13 PROCEDURE — 2580000003 HC RX 258: Performed by: PHYSICIAN ASSISTANT

## 2019-08-13 PROCEDURE — 85025 COMPLETE CBC W/AUTO DIFF WBC: CPT

## 2019-08-13 PROCEDURE — 96361 HYDRATE IV INFUSION ADD-ON: CPT

## 2019-08-13 PROCEDURE — 86901 BLOOD TYPING SEROLOGIC RH(D): CPT

## 2019-08-13 RX ORDER — IBUPROFEN 600 MG/1
600 TABLET ORAL EVERY 6 HOURS PRN
Qty: 30 TABLET | Refills: 0 | Status: SHIPPED | OUTPATIENT
Start: 2019-08-13 | End: 2019-10-09 | Stop reason: ALTCHOICE

## 2019-08-13 RX ORDER — KETOROLAC TROMETHAMINE 30 MG/ML
30 INJECTION, SOLUTION INTRAMUSCULAR; INTRAVENOUS ONCE
Status: COMPLETED | OUTPATIENT
Start: 2019-08-13 | End: 2019-08-13

## 2019-08-13 RX ORDER — OXYCODONE HYDROCHLORIDE AND ACETAMINOPHEN 5; 325 MG/1; MG/1
1 TABLET ORAL ONCE
Status: COMPLETED | OUTPATIENT
Start: 2019-08-13 | End: 2019-08-13

## 2019-08-13 RX ORDER — 0.9 % SODIUM CHLORIDE 0.9 %
1000 INTRAVENOUS SOLUTION INTRAVENOUS ONCE
Status: COMPLETED | OUTPATIENT
Start: 2019-08-13 | End: 2019-08-13

## 2019-08-13 RX ORDER — METHYLPREDNISOLONE SODIUM SUCCINATE 125 MG/2ML
125 INJECTION, POWDER, LYOPHILIZED, FOR SOLUTION INTRAMUSCULAR; INTRAVENOUS ONCE
Status: COMPLETED | OUTPATIENT
Start: 2019-08-13 | End: 2019-08-13

## 2019-08-13 RX ORDER — DIPHENHYDRAMINE HYDROCHLORIDE 50 MG/ML
25 INJECTION INTRAMUSCULAR; INTRAVENOUS ONCE
Status: COMPLETED | OUTPATIENT
Start: 2019-08-13 | End: 2019-08-13

## 2019-08-13 RX ORDER — TRAMADOL HYDROCHLORIDE 50 MG/1
50 TABLET ORAL EVERY 6 HOURS PRN
Qty: 12 TABLET | Refills: 0 | Status: SHIPPED | OUTPATIENT
Start: 2019-08-13 | End: 2019-08-16

## 2019-08-13 RX ADMIN — KETOROLAC TROMETHAMINE 30 MG: 30 INJECTION, SOLUTION INTRAMUSCULAR at 15:51

## 2019-08-13 RX ADMIN — METHYLPREDNISOLONE SODIUM SUCCINATE 125 MG: 125 INJECTION, POWDER, FOR SOLUTION INTRAMUSCULAR; INTRAVENOUS at 16:55

## 2019-08-13 RX ADMIN — DIPHENHYDRAMINE HYDROCHLORIDE 25 MG: 50 INJECTION, SOLUTION INTRAMUSCULAR; INTRAVENOUS at 16:55

## 2019-08-13 RX ADMIN — SODIUM CHLORIDE 1000 ML: 9 INJECTION, SOLUTION INTRAVENOUS at 15:52

## 2019-08-13 RX ADMIN — OXYCODONE HYDROCHLORIDE AND ACETAMINOPHEN 1 TABLET: 5; 325 TABLET ORAL at 16:23

## 2019-08-13 RX ADMIN — DIPHENHYDRAMINE HYDROCHLORIDE 25 MG: 50 INJECTION, SOLUTION INTRAMUSCULAR; INTRAVENOUS at 16:24

## 2019-08-13 ASSESSMENT — ENCOUNTER SYMPTOMS
ABDOMINAL PAIN: 1
VOMITING: 0
NAUSEA: 0
BACK PAIN: 0
SORE THROAT: 0
SHORTNESS OF BREATH: 0

## 2019-08-13 ASSESSMENT — PAIN SCALES - GENERAL
PAINLEVEL_OUTOF10: 10
PAINLEVEL_OUTOF10: 6
PAINLEVEL_OUTOF10: 9
PAINLEVEL_OUTOF10: 10

## 2019-08-13 ASSESSMENT — PAIN DESCRIPTION - DESCRIPTORS: DESCRIPTORS: CRAMPING;CONSTANT

## 2019-08-13 ASSESSMENT — PAIN DESCRIPTION - LOCATION: LOCATION: ABDOMEN;BACK

## 2019-08-13 ASSESSMENT — PAIN - FUNCTIONAL ASSESSMENT: PAIN_FUNCTIONAL_ASSESSMENT: PREVENTS OR INTERFERES SOME ACTIVE ACTIVITIES AND ADLS

## 2019-08-13 ASSESSMENT — PAIN DESCRIPTION - PAIN TYPE: TYPE: ACUTE PAIN

## 2019-08-13 ASSESSMENT — PAIN DESCRIPTION - PROGRESSION: CLINICAL_PROGRESSION: GRADUALLY WORSENING

## 2019-08-13 ASSESSMENT — PAIN DESCRIPTION - ONSET: ONSET: PROGRESSIVE

## 2019-08-13 ASSESSMENT — PAIN DESCRIPTION - FREQUENCY: FREQUENCY: CONTINUOUS

## 2019-08-13 NOTE — ED PROVIDER NOTES
immediatereturn. PROCEDURES:  None    FINAL IMPRESSION      1. Dysmenorrhea    2. DUB (dysfunctional uterine bleeding)          DISPOSITION/PLAN   DISPOSITION Decision To Discharge 08/13/2019 05:26:12 PM      PATIENT REFERRED TO:  Daquan Cota MD  [place] [address]  [phone]    Schedule an appointment as soon as possible for a visit   or with your gynecologist      MEDICATIONS:  New Prescriptions    IBUPROFEN (IBU) 600 MG TABLET    Take 1 tablet by mouth every 6 hours as needed for Pain    TRAMADOL (ULTRAM) 50 MG TABLET    Take 1 tablet by mouth every 6 hours as needed for Pain for up to 3 days. Intended supply: 3 days.  Take lowest dose possible to manage pain       (Please note that portions of this note were completed with a voice recognition program.  Efforts were made toedit the dictations but occasionally words are mis-transcribed.)    AVINASH Andrade PA-C  08/13/19 1736

## 2019-10-09 ENCOUNTER — OFFICE VISIT (OUTPATIENT)
Dept: FAMILY MEDICINE CLINIC | Age: 24
End: 2019-10-09
Payer: COMMERCIAL

## 2019-10-09 VITALS
WEIGHT: 145 LBS | BODY MASS INDEX: 24.75 KG/M2 | SYSTOLIC BLOOD PRESSURE: 112 MMHG | TEMPERATURE: 98.8 F | HEART RATE: 82 BPM | DIASTOLIC BLOOD PRESSURE: 74 MMHG | RESPIRATION RATE: 14 BRPM | HEIGHT: 64 IN

## 2019-10-09 DIAGNOSIS — F41.9 ANXIETY: ICD-10-CM

## 2019-10-09 DIAGNOSIS — I10 ESSENTIAL HYPERTENSION: ICD-10-CM

## 2019-10-09 DIAGNOSIS — F31.62 BIPOLAR DISORDER, CURRENT EPISODE MIXED, MODERATE (HCC): ICD-10-CM

## 2019-10-09 DIAGNOSIS — F22 PSYCHOSIS, PARANOID (HCC): Primary | ICD-10-CM

## 2019-10-09 PROCEDURE — 99214 OFFICE O/P EST MOD 30 MIN: CPT | Performed by: FAMILY MEDICINE

## 2019-10-09 RX ORDER — CLONAZEPAM 0.5 MG/1
0.5 TABLET ORAL 2 TIMES DAILY PRN
Qty: 60 TABLET | Refills: 0 | Status: SHIPPED | OUTPATIENT
Start: 2019-10-09 | End: 2019-11-06 | Stop reason: SDUPTHER

## 2019-10-23 ENCOUNTER — OFFICE VISIT (OUTPATIENT)
Dept: FAMILY MEDICINE CLINIC | Age: 24
End: 2019-10-23
Payer: MEDICARE

## 2019-10-23 VITALS
BODY MASS INDEX: 25.16 KG/M2 | SYSTOLIC BLOOD PRESSURE: 114 MMHG | HEART RATE: 82 BPM | TEMPERATURE: 98.6 F | DIASTOLIC BLOOD PRESSURE: 74 MMHG | WEIGHT: 147.4 LBS | RESPIRATION RATE: 14 BRPM | HEIGHT: 64 IN

## 2019-10-23 DIAGNOSIS — G47.00 INSOMNIA, UNSPECIFIED TYPE: ICD-10-CM

## 2019-10-23 DIAGNOSIS — F31.62 BIPOLAR DISORDER, CURRENT EPISODE MIXED, MODERATE (HCC): Primary | ICD-10-CM

## 2019-10-23 PROBLEM — N93.8 DYSFUNCTIONAL UTERINE BLEEDING: Status: ACTIVE | Noted: 2019-10-23

## 2019-10-23 PROCEDURE — 99213 OFFICE O/P EST LOW 20 MIN: CPT | Performed by: FAMILY MEDICINE

## 2019-10-23 RX ORDER — IBUPROFEN 600 MG/1
600 TABLET ORAL EVERY 6 HOURS PRN
Qty: 60 TABLET | Refills: 1 | Status: SHIPPED | OUTPATIENT
Start: 2019-10-23 | End: 2020-06-21 | Stop reason: SINTOL

## 2019-11-06 DIAGNOSIS — F41.9 ANXIETY: ICD-10-CM

## 2019-11-06 RX ORDER — CLONAZEPAM 0.5 MG/1
0.5 TABLET ORAL 2 TIMES DAILY PRN
Qty: 60 TABLET | Refills: 0 | Status: SHIPPED | OUTPATIENT
Start: 2019-11-06 | End: 2019-11-19

## 2019-11-07 ENCOUNTER — TELEPHONE (OUTPATIENT)
Dept: FAMILY MEDICINE CLINIC | Age: 24
End: 2019-11-07

## 2019-11-08 ENCOUNTER — OFFICE VISIT (OUTPATIENT)
Dept: FAMILY MEDICINE CLINIC | Age: 24
End: 2019-11-08
Payer: MEDICARE

## 2019-11-08 ENCOUNTER — TELEPHONE (OUTPATIENT)
Dept: FAMILY MEDICINE CLINIC | Age: 24
End: 2019-11-08

## 2019-11-08 VITALS
HEIGHT: 64 IN | HEART RATE: 98 BPM | BODY MASS INDEX: 25.95 KG/M2 | RESPIRATION RATE: 16 BRPM | DIASTOLIC BLOOD PRESSURE: 70 MMHG | SYSTOLIC BLOOD PRESSURE: 122 MMHG | WEIGHT: 152 LBS

## 2019-11-08 DIAGNOSIS — F31.70 BIPOLAR AFFECTIVE DISORDER IN REMISSION (HCC): Primary | ICD-10-CM

## 2019-11-08 DIAGNOSIS — F41.9 ANXIETY: ICD-10-CM

## 2019-11-08 PROCEDURE — G8419 CALC BMI OUT NRM PARAM NOF/U: HCPCS | Performed by: FAMILY MEDICINE

## 2019-11-08 PROCEDURE — G8427 DOCREV CUR MEDS BY ELIG CLIN: HCPCS | Performed by: FAMILY MEDICINE

## 2019-11-08 PROCEDURE — 99213 OFFICE O/P EST LOW 20 MIN: CPT | Performed by: FAMILY MEDICINE

## 2019-11-08 PROCEDURE — 4004F PT TOBACCO SCREEN RCVD TLK: CPT | Performed by: FAMILY MEDICINE

## 2019-11-08 PROCEDURE — G8484 FLU IMMUNIZE NO ADMIN: HCPCS | Performed by: FAMILY MEDICINE

## 2019-11-12 ENCOUNTER — TELEPHONE (OUTPATIENT)
Dept: FAMILY MEDICINE CLINIC | Age: 24
End: 2019-11-12

## 2019-11-14 RX ORDER — ARIPIPRAZOLE 10 MG/1
10 TABLET ORAL DAILY
Qty: 30 TABLET | Refills: 0 | Status: SHIPPED | OUTPATIENT
Start: 2019-11-14 | End: 2019-12-10 | Stop reason: SDUPTHER

## 2019-11-19 DIAGNOSIS — F41.9 ANXIETY: Primary | ICD-10-CM

## 2019-11-19 RX ORDER — CLONAZEPAM 1 MG/1
1 TABLET ORAL 2 TIMES DAILY PRN
Qty: 32 TABLET | Refills: 0 | Status: SHIPPED | OUTPATIENT
Start: 2019-11-24 | End: 2019-12-10 | Stop reason: SDUPTHER

## 2019-12-10 ENCOUNTER — OFFICE VISIT (OUTPATIENT)
Dept: FAMILY MEDICINE CLINIC | Age: 24
End: 2019-12-10
Payer: MEDICARE

## 2019-12-10 VITALS
HEIGHT: 64 IN | BODY MASS INDEX: 25.78 KG/M2 | SYSTOLIC BLOOD PRESSURE: 110 MMHG | DIASTOLIC BLOOD PRESSURE: 72 MMHG | WEIGHT: 151 LBS | RESPIRATION RATE: 20 BRPM | HEART RATE: 88 BPM | OXYGEN SATURATION: 98 %

## 2019-12-10 DIAGNOSIS — F41.9 ANXIETY: Primary | ICD-10-CM

## 2019-12-10 DIAGNOSIS — F31.70 BIPOLAR AFFECTIVE DISORDER IN REMISSION (HCC): ICD-10-CM

## 2019-12-10 DIAGNOSIS — G43.009 MIGRAINE WITHOUT AURA AND WITHOUT STATUS MIGRAINOSUS, NOT INTRACTABLE: ICD-10-CM

## 2019-12-10 DIAGNOSIS — Z79.899 LONG TERM USE OF DRUG: ICD-10-CM

## 2019-12-10 PROCEDURE — G8427 DOCREV CUR MEDS BY ELIG CLIN: HCPCS | Performed by: FAMILY MEDICINE

## 2019-12-10 PROCEDURE — G8419 CALC BMI OUT NRM PARAM NOF/U: HCPCS | Performed by: FAMILY MEDICINE

## 2019-12-10 PROCEDURE — 99214 OFFICE O/P EST MOD 30 MIN: CPT | Performed by: FAMILY MEDICINE

## 2019-12-10 PROCEDURE — 4004F PT TOBACCO SCREEN RCVD TLK: CPT | Performed by: FAMILY MEDICINE

## 2019-12-10 PROCEDURE — G8484 FLU IMMUNIZE NO ADMIN: HCPCS | Performed by: FAMILY MEDICINE

## 2019-12-10 RX ORDER — SUMATRIPTAN 100 MG/1
100 TABLET, FILM COATED ORAL
Qty: 9 TABLET | Refills: 1 | Status: SHIPPED | OUTPATIENT
Start: 2019-12-10 | End: 2021-03-05 | Stop reason: ALTCHOICE

## 2019-12-10 RX ORDER — METOPROLOL SUCCINATE 50 MG/1
25-50 TABLET, EXTENDED RELEASE ORAL DAILY
Qty: 30 TABLET | Refills: 2 | Status: SHIPPED | OUTPATIENT
Start: 2019-12-10 | End: 2021-03-05 | Stop reason: ALTCHOICE

## 2019-12-10 RX ORDER — ARIPIPRAZOLE 10 MG/1
10 TABLET ORAL DAILY
Qty: 30 TABLET | Refills: 5 | Status: SHIPPED | OUTPATIENT
Start: 2019-12-10 | End: 2019-12-30

## 2019-12-10 RX ORDER — CLONAZEPAM 1 MG/1
.5-1 TABLET ORAL 3 TIMES DAILY PRN
Qty: 75 TABLET | Refills: 1 | Status: SHIPPED | OUTPATIENT
Start: 2019-12-10 | End: 2020-02-07 | Stop reason: SDUPTHER

## 2019-12-28 ENCOUNTER — PATIENT MESSAGE (OUTPATIENT)
Dept: FAMILY MEDICINE CLINIC | Age: 24
End: 2019-12-28

## 2019-12-30 RX ORDER — ARIPIPRAZOLE 15 MG/1
15 TABLET ORAL DAILY
Qty: 30 TABLET | Refills: 3 | Status: SHIPPED | OUTPATIENT
Start: 2019-12-30 | End: 2020-01-07

## 2020-01-07 RX ORDER — ARIPIPRAZOLE 10 MG/1
10 TABLET ORAL DAILY
Qty: 30 TABLET | Refills: 2 | Status: SHIPPED | OUTPATIENT
Start: 2020-01-07 | End: 2020-02-28

## 2020-02-07 RX ORDER — CLONAZEPAM 1 MG/1
.5-1 TABLET ORAL 3 TIMES DAILY PRN
Qty: 75 TABLET | Refills: 0 | Status: SHIPPED | OUTPATIENT
Start: 2020-02-07 | End: 2021-05-03

## 2020-02-20 ENCOUNTER — OFFICE VISIT (OUTPATIENT)
Dept: FAMILY MEDICINE CLINIC | Age: 25
End: 2020-02-20
Payer: MEDICARE

## 2020-02-20 ENCOUNTER — TELEPHONE (OUTPATIENT)
Dept: FAMILY MEDICINE CLINIC | Age: 25
End: 2020-02-20

## 2020-02-20 VITALS
HEIGHT: 64 IN | BODY MASS INDEX: 26.46 KG/M2 | SYSTOLIC BLOOD PRESSURE: 102 MMHG | WEIGHT: 155 LBS | DIASTOLIC BLOOD PRESSURE: 68 MMHG | RESPIRATION RATE: 10 BRPM | HEART RATE: 68 BPM | OXYGEN SATURATION: 99 %

## 2020-02-20 PROCEDURE — 99214 OFFICE O/P EST MOD 30 MIN: CPT | Performed by: FAMILY MEDICINE

## 2020-02-20 PROCEDURE — G8484 FLU IMMUNIZE NO ADMIN: HCPCS | Performed by: FAMILY MEDICINE

## 2020-02-20 PROCEDURE — 4004F PT TOBACCO SCREEN RCVD TLK: CPT | Performed by: FAMILY MEDICINE

## 2020-02-20 PROCEDURE — G8427 DOCREV CUR MEDS BY ELIG CLIN: HCPCS | Performed by: FAMILY MEDICINE

## 2020-02-20 PROCEDURE — G8419 CALC BMI OUT NRM PARAM NOF/U: HCPCS | Performed by: FAMILY MEDICINE

## 2020-02-20 RX ORDER — ARIPIPRAZOLE 5 MG/1
5 TABLET ORAL DAILY
Qty: 30 TABLET | Refills: 2 | Status: SHIPPED | OUTPATIENT
Start: 2020-02-20 | End: 2020-02-28

## 2020-02-20 RX ORDER — BUSPIRONE HYDROCHLORIDE 10 MG/1
10 TABLET ORAL 2 TIMES DAILY
Qty: 60 TABLET | Refills: 2 | Status: SHIPPED | OUTPATIENT
Start: 2020-02-20 | End: 2020-03-21

## 2020-02-20 NOTE — PROGRESS NOTES
Dr. Nupur Dumont   [facility], [place], [address]  Phone: [phone]    HPI:  Chief Complaint   Patient presents with    Anxiety     3 MO ANXIETY ROUTINE FOLLOW UP UDS NEEDED PT HAS NEVER LEFT ONE NOT DOING WELL WITH ABILIFY MAKES HER FEEL LIKE A ZOMBIE         Lizette Villar is a 25 y.o. female here for evaluation of anxiety. She notes that she has been on abilify 15 mg for three days. Patient notes that while on abilify 10 mg, she felt \"like a zombie\" and had decreased appetite and notes that she has smoked marijuana. She reports that she is sleeping well at night. Patient notes that she has difficulty focusing and states that her anxiety has become worse over the past week. She states that she took Xanax in the past for anxiety. Patient reports that paxil and zoloft did not help her anxiety in the past. Patient notes that she is interested in visiting a psychiatrist. She reports that she has been taking 2.5 tablets of klonopin 1 mg daily because of her increased anxiety. Patient reports that she has been taking metoprolol 50 mg daily. She states that she has not had migraines recently. Patient states that when she does have migraines, she has an aura and becomes sensitive to light and noise. She reports that her throat has been sore and she has had chills. Patient notes that she has had post nasal drainage. She states that she is healing well from her tubal ligation. Denies chest pain, pressure, tightness, or palpitations.      Vitals:  BP Readings from Last 3 Encounters:   02/20/20 102/68   12/10/19 110/72   11/08/19 122/70       Pulse Readings from Last 3 Encounters:   02/20/20 68   12/10/19 88   11/08/19 98        Wt Readings from Last 3 Encounters:   02/20/20 155 lb (70.3 kg)   12/10/19 151 lb (68.5 kg)   11/08/19 152 lb (68.9 kg)        Medication Review:  Current Outpatient Medications   Medication Sig Dispense Refill    busPIRone (BUSPAR) 10 MG tablet Take 1 tablet by mouth 2 times daily 60 tablet 2    ARIPiprazole (ABILIFY) 5 MG tablet Take 1 tablet by mouth daily 30 tablet 2    clonazePAM (KLONOPIN) 1 MG tablet Take 0.5-1 tablets by mouth 3 times daily as needed for Anxiety for up to 30 days. 75 tablet 0    ARIPiprazole (ABILIFY) 10 MG tablet Take 1 tablet by mouth daily 30 tablet 2    metoprolol succinate (TOPROL XL) 50 MG extended release tablet Take 0.5-1 tablets by mouth daily 30 tablet 2    SUMAtriptan (IMITREX) 100 MG tablet Take 1 tablet by mouth once as needed for Migraine 9 tablet 1    ibuprofen (IBU) 600 MG tablet Take 1 tablet by mouth every 6 hours as needed for Pain 60 tablet 1     No current facility-administered medications for this visit. Review of Systems:   All others are negative, except as noted in the HPI. Patient History:  Past Medical History:   Diagnosis Date    Anxiety 2018    Depressive disorder 2018    History of pulmonary embolism 2017    implanted birth control?     [facility]    Smoker         Past Surgical History:   Procedure Laterality Date     SECTION  11/28/2011    X2, 2017 and      SECTION  2017    CHOLECYSTECTOMY, LAPAROSCOPIC  2017    ROBOTIC XI ASSISTED LAPAROSCOPIC CHOLECYSTECTOMY WITH CHOLANGIOGRAM        Social History     Socioeconomic History    Marital status: Single     Spouse name: Not on file    Number of children: 2    Years of education: Not on file    Highest education level: Not on file   Occupational History    Not on file   Social Needs    Financial resource strain: Not on file    Food insecurity:     Worry: Not on file     Inability: Not on file    Transportation needs:     Medical: Not on file     Non-medical: Not on file   Tobacco Use    Smoking status: Current Every Day Smoker     Packs/day: 1.00     Years: 7.00     Pack years: 7.00     Types: Cigarettes    Smokeless tobacco: Never Used    Tobacco comment: off and on soft.      Tenderness: There is no abdominal tenderness. Skin:     General: Skin is warm and dry. Neurological:      Mental Status: She is alert and oriented to person, place, and time. Laboratory Studies:  No results found for: LABA1C     Lab Results   Component Value Date    WBC 5.5 01/06/2020    HGB 15.9 (H) 01/06/2020    HCT 47.3 (H) 01/06/2020    MCV 91.7 01/06/2020     01/06/2020        Lab Results   Component Value Date     08/13/2019    K 4.1 08/13/2019     08/13/2019    CO2 25 08/13/2019    BUN 9 08/13/2019    CREATININE 0.5 (L) 08/13/2019    GLUCOSE 100 (H) 08/13/2019    CALCIUM 9.6 08/13/2019    PROT 7.1 05/08/2019    LABALBU 4.8 05/08/2019    BILITOT 0.9 05/08/2019    ALKPHOS 52 05/08/2019    AST 15 05/08/2019    ALT 14 05/08/2019    LABGLOM >60 08/13/2019    GFRAA >60 08/13/2019    AGRATIO 2.1 05/08/2019    GLOB 2.3 05/08/2019       Lab Results   Component Value Date    CHOL 144 05/08/2019    TRIG 60 05/08/2019    HDL 66 (H) 05/08/2019    LDLCALC 66 05/08/2019    LABVLDL 12 05/08/2019       No results found for: TSH, O9DYZCU, Y8SWLUQ, THYROIDAB, FT3, T4FREE     No results found for: [address] Maintenance Review:  Health Maintenance Due   Topic Date Due    Varicella vaccine (1 of 2 - 2-dose childhood series) 07/16/1996    Pneumococcal 0-64 years Vaccine (1 of 1 - PPSV23) 07/16/2001    HPV vaccine (1 - Female 2-dose series) 07/16/2006    Cervical cancer screen  07/16/2016    Flu vaccine (1) 09/01/2019    Chlamydia screen  01/25/2020         Immunizations:  Immunization History   Administered Date(s) Administered    Influenza Virus Vaccine 02/10/2016    MMR 06/25/2016    Tdap (Boostrix, Adacel) 04/18/2016         Assessment:   1. Bipolar affective disorder in remission ([facility]    2. Long-term use of high-risk medication    3. Anxiety    4. Migraine with aura and without status migrainosus, not intractable    5.  Viral URI           Plan:  Bipolar Prescriptions     Signed Prescriptions Disp Refills    busPIRone (BUSPAR) 10 MG tablet 60 tablet 2     Sig: Take 1 tablet by mouth 2 times daily    ARIPiprazole (ABILIFY) 5 MG tablet 30 tablet 2     Sig: Take 1 tablet by mouth daily      Orders Placed This Encounter   Procedures    POCT Rapid Drug Screen           By signing my name below, IOliver, attest that this documentation has been prepared under the direction and in the presence of Chary Anna MD.   Electronically Signed: Mervin Capone, 2/20/2020, 10:04 AM.      I, Chary Anna MD, personally performed the services described in this documentation. All medical record entries made by the scribe were at my direction and in my presence. I have reviewed the chart and discharge instructions (if applicable) and agree that the record reflects my personal performance and is accurate and complete.  Chary Anna MD, 2/20/2020, 12:52 PM.

## 2020-02-20 NOTE — TELEPHONE ENCOUNTER
Pt has been referred to Shannon Manzano from [address] [place] [address]. Pt no showed with Keli Stark in the past pt said she called and canceled that apt. Pt said she canceled bc at that time she did not have insurance. Please call pt back and let her know if she needs to go else where.        [phone]

## 2020-02-27 ENCOUNTER — TELEPHONE (OUTPATIENT)
Dept: FAMILY MEDICINE CLINIC | Age: 25
End: 2020-02-27

## 2020-02-28 ENCOUNTER — OFFICE VISIT (OUTPATIENT)
Dept: FAMILY MEDICINE CLINIC | Age: 25
End: 2020-02-28
Payer: MEDICARE

## 2020-02-28 VITALS
WEIGHT: 154 LBS | BODY MASS INDEX: 26.29 KG/M2 | DIASTOLIC BLOOD PRESSURE: 68 MMHG | SYSTOLIC BLOOD PRESSURE: 102 MMHG | HEIGHT: 64 IN

## 2020-02-28 PROCEDURE — G8484 FLU IMMUNIZE NO ADMIN: HCPCS | Performed by: FAMILY MEDICINE

## 2020-02-28 PROCEDURE — G8427 DOCREV CUR MEDS BY ELIG CLIN: HCPCS | Performed by: FAMILY MEDICINE

## 2020-02-28 PROCEDURE — 4004F PT TOBACCO SCREEN RCVD TLK: CPT | Performed by: FAMILY MEDICINE

## 2020-02-28 PROCEDURE — 99213 OFFICE O/P EST LOW 20 MIN: CPT | Performed by: FAMILY MEDICINE

## 2020-02-28 PROCEDURE — G8419 CALC BMI OUT NRM PARAM NOF/U: HCPCS | Performed by: FAMILY MEDICINE

## 2020-02-28 RX ORDER — ZIPRASIDONE HYDROCHLORIDE 60 MG/1
60 CAPSULE ORAL 2 TIMES DAILY WITH MEALS
Qty: 60 CAPSULE | Refills: 1 | Status: SHIPPED | OUTPATIENT
Start: 2020-02-28 | End: 2020-05-01 | Stop reason: ALTCHOICE

## 2020-02-28 RX ORDER — VILAZODONE HYDROCHLORIDE 20 MG/1
20 TABLET ORAL DAILY
Qty: 30 TABLET | Refills: 1 | Status: SHIPPED | OUTPATIENT
Start: 2020-02-28 | End: 2020-05-01 | Stop reason: ALTCHOICE

## 2020-02-28 NOTE — PROGRESS NOTES
metoprolol succinate (TOPROL XL) 50 MG extended release tablet Take 0.5-1 tablets by mouth daily 30 tablet 2    ibuprofen (IBU) 600 MG tablet Take 1 tablet by mouth every 6 hours as needed for Pain 60 tablet 1    SUMAtriptan (IMITREX) 100 MG tablet Take 1 tablet by mouth once as needed for Migraine 9 tablet 1     No current facility-administered medications for this visit. Review of Systems:   All others are negative, except as noted in the HPI. Patient History:  Past Medical History:   Diagnosis Date    Anxiety 2018    Depressive disorder 2018    History of pulmonary embolism 2017    implanted birth control?  Psychosis ([facility] 2018    Smoker         Past Surgical History:   Procedure Laterality Date     SECTION  11/28/2011    X2,  and      SECTION  2017    CHOLECYSTECTOMY, LAPAROSCOPIC  2017    ROBOTIC XI ASSISTED LAPAROSCOPIC CHOLECYSTECTOMY WITH CHOLANGIOGRAM        Social History     Socioeconomic History    Marital status: Single     Spouse name: Not on file    Number of children: 2    Years of education: Not on file    Highest education level: Not on file   Occupational History    Not on file   Social Needs    Financial resource strain: Not on file    Food insecurity:     Worry: Not on file     Inability: Not on file    Transportation needs:     Medical: Not on file     Non-medical: Not on file   Tobacco Use    Smoking status: Current Every Day Smoker     Packs/day: 1.00     Years: 7.00     Pack years: 7.00     Types: Cigarettes    Smokeless tobacco: Never Used    Tobacco comment: off and on since Feb   Substance and Sexual Activity    Alcohol use:  Yes    Drug use: Not on file     Comment: occassionally    Sexual activity: Not Currently     Partners: Male   Lifestyle    Physical activity:     Days per week: Not on file     Minutes per session: Not on file    Stress: Not on file   Relationships    Social connections:     Talks 05/08/2019    GLOB 2.3 05/08/2019       Lab Results   Component Value Date    CHOL 144 05/08/2019    TRIG 60 05/08/2019    HDL 66 (H) 05/08/2019    LDLCALC 66 05/08/2019    LABVLDL 12 05/08/2019       No results found for: TSH, I4IYNHA, Q9CKCSM, THYROIDAB, FT3, T4FREE     No results found for: [address] Maintenance Review:  Health Maintenance Due   Topic Date Due    Varicella vaccine (1 of 2 - 2-dose childhood series) 07/16/1996    Pneumococcal 0-64 years Vaccine (1 of 1 - PPSV23) 07/16/2001    HPV vaccine (1 - Female 2-dose series) 07/16/2006    Cervical cancer screen  07/16/2016    Flu vaccine (1) 09/01/2019    Chlamydia screen  01/25/2020         Immunizations:  Immunization History   Administered Date(s) Administered    Influenza Virus Vaccine 02/10/2016    MMR 06/25/2016    Tdap (Boostrix, Adacel) 04/18/2016         Assessment:   1. Bipolar 2 disorder ([facility]           Plan:  Bipolar 2 disorder ([facility]  -     ziprasidone (GEODON) 60 MG capsule; Take 1 capsule by mouth 2 times daily (with meals)  -     vilazodone HCl (VILAZODONE HCL) 20 MG TABS; Take 1 tablet by mouth daily  - Discontinue Abilify 5 mg  - Continue on Buspar 10 mg 1 tablet  - List of psychiatrists given and encouraged f/u w/ psychiatry asap as outpatient  - Note for work given to be off over weekend  - Encouraged her to have individuals around her and to go to ER if si/hi or severe worsening in mood      I have reviewed patient's pertinent medical history, relevant laboratory and imaging studies, and past/future health maintenance. Patient's medications have been reviewed and were discussed with the patient. Refills up-to-date. Discussed with the patient the importance of adhering to their current medication regimen as directed. Advised the patient that they should continue to work on eating a healthy balanced diet and staying active by exercising within their personal limits.  Patient was advised to keep future appointments with their respective specialty care team(s). Patient had the opportunity to ask questions, all of which were answered to the best of my ability and with patient satisfaction. Patient advised to schedule a return visit for reevaluation in as needed. Patient understands and is agreeable with the care plan following today's visit. Patient is to schedule an appointment for any new or worsening symptoms. Requested Prescriptions     Signed Prescriptions Disp Refills    ziprasidone (GEODON) 60 MG capsule 60 capsule 1     Sig: Take 1 capsule by mouth 2 times daily (with meals)    vilazodone HCl (VILAZODONE HCL) 20 MG TABS 30 tablet 1     Sig: Take 1 tablet by mouth daily      No orders of the defined types were placed in this encounter. By signing my name below, I, Oliver Harris, attest that this documentation has been prepared under the direction and in the presence of Chary Anna MD.   Electronically Signed: Mervin Capone, 2/28/2020, 4:06 PM.      Alvarez Serna MD, personally performed the services described in this documentation. All medical record entries made by the scribe were at my direction and in my presence. I have reviewed the chart and discharge instructions (if applicable) and agree that the record reflects my personal performance and is accurate and complete.  Chary Anna MD, 2/28/2020, 7:03 PM.

## 2020-03-02 ENCOUNTER — TELEPHONE (OUTPATIENT)
Dept: INTERNAL MEDICINE CLINIC | Age: 25
End: 2020-03-02

## 2020-03-05 ENCOUNTER — TELEPHONE (OUTPATIENT)
Dept: FAMILY MEDICINE CLINIC | Age: 25
End: 2020-03-05

## 2020-03-06 ENCOUNTER — TELEPHONE (OUTPATIENT)
Dept: FAMILY MEDICINE CLINIC | Age: 25
End: 2020-03-06

## 2020-03-06 NOTE — TELEPHONE ENCOUNTER
ALREADY WORKING ON THIS PEER TO PEER WILL BE [address] BETWEEN TODAY AND Tuesday. Claudeen Every

## 2020-03-10 NOTE — TELEPHONE ENCOUNTER
SPOKE TO PT AND SHE HAS AN APPT SCHEDULED WITH A PSYCHIATRIST ON 3/24/2020. PT UNDERSTANDS THAT THE PSYCHIATRIST WILL HAVE TO RENEW THE PRIOR AUTH IN 3 MONTHS.   [facility]

## 2020-03-10 NOTE — TELEPHONE ENCOUNTER
Awaiting insurance but doctor for insurance said they never received her records, but doesn't need now - hopefully will approve.   We need to make sure Seth Cardoza is seeing a psychiatrist asap!!!

## 2020-05-01 ENCOUNTER — OFFICE VISIT (OUTPATIENT)
Dept: PRIMARY CARE CLINIC | Age: 25
End: 2020-05-01
Payer: MEDICARE

## 2020-05-01 ENCOUNTER — TELEMEDICINE (OUTPATIENT)
Dept: FAMILY MEDICINE CLINIC | Age: 25
End: 2020-05-01
Payer: MEDICARE

## 2020-05-01 VITALS — HEART RATE: 88 BPM | TEMPERATURE: 98.6 F | OXYGEN SATURATION: 98 %

## 2020-05-01 PROCEDURE — G8427 DOCREV CUR MEDS BY ELIG CLIN: HCPCS | Performed by: NURSE PRACTITIONER

## 2020-05-01 PROCEDURE — G8427 DOCREV CUR MEDS BY ELIG CLIN: HCPCS | Performed by: FAMILY MEDICINE

## 2020-05-01 PROCEDURE — 99213 OFFICE O/P EST LOW 20 MIN: CPT | Performed by: FAMILY MEDICINE

## 2020-05-01 PROCEDURE — G8419 CALC BMI OUT NRM PARAM NOF/U: HCPCS | Performed by: NURSE PRACTITIONER

## 2020-05-01 PROCEDURE — 4004F PT TOBACCO SCREEN RCVD TLK: CPT | Performed by: NURSE PRACTITIONER

## 2020-05-01 PROCEDURE — 99213 OFFICE O/P EST LOW 20 MIN: CPT | Performed by: NURSE PRACTITIONER

## 2020-05-01 RX ORDER — PANTOPRAZOLE SODIUM 40 MG/1
40 TABLET, DELAYED RELEASE ORAL
Qty: 30 TABLET | Refills: 1 | Status: SHIPPED
Start: 2020-05-01 | End: 2020-06-21 | Stop reason: ALTCHOICE

## 2020-05-01 RX ORDER — LITHIUM CARBONATE 150 MG/1
300 CAPSULE ORAL 2 TIMES DAILY WITH MEALS
COMMUNITY
Start: 2020-04-21 | End: 2020-06-23 | Stop reason: DRUGHIGH

## 2020-05-01 ASSESSMENT — PATIENT HEALTH QUESTIONNAIRE - PHQ9
SUM OF ALL RESPONSES TO PHQ9 QUESTIONS 1 & 2: 1
2. FEELING DOWN, DEPRESSED OR HOPELESS: 0
SUM OF ALL RESPONSES TO PHQ QUESTIONS 1-9: 1
SUM OF ALL RESPONSES TO PHQ QUESTIONS 1-9: 1
1. LITTLE INTEREST OR PLEASURE IN DOING THINGS: 1

## 2020-05-01 NOTE — PROGRESS NOTES
Hives    Naprosyn [Naproxen] Hives, Swelling and Rash   ,   Past Medical History:   Diagnosis Date    Anxiety 2018    Depressive disorder 2018    History of pulmonary embolism 2017    implanted birth control?  Psychosis ([facility] [address] 2018    Smoker    ,   Past Surgical History:   Procedure Laterality Date     SECTION  11/28/2011    X2, 2017 and 2011     SECTION  2017    CHOLECYSTECTOMY, LAPAROSCOPIC  2017    ROBOTIC XI ASSISTED LAPAROSCOPIC CHOLECYSTECTOMY WITH CHOLANGIOGRAM   ,   Social History     Tobacco Use    Smoking status: Current Every Day Smoker     Packs/day: 1.00     Years: 7.00     Pack years: 7.00     Types: Cigarettes    Smokeless tobacco: Never Used    Tobacco comment: off and on since Feb   Substance Use Topics    Alcohol use:  Yes    Drug use: Not on file     Comment: occassionally   ,   Family History   Problem Relation Age of Onset    Hypertension Father     Hypertension Mother    Niko Coad Bipolar Disorder Mother     Anxiety Disorder Mother     Depression Mother     Depression Sister     Depression Brother    ,   Immunization History   Administered Date(s) Administered    Influenza Virus Vaccine 02/10/2016    MMR 2016    Tdap (Boostrix, Adacel) 2016   ,   Health Maintenance   Topic Date Due    Varicella vaccine (1 of 2 - 2-dose childhood series) 1996    Pneumococcal 0-64 years Vaccine (1 of 1 - PPSV23) 2001    HPV vaccine (1 - 2-dose series) 2006    Cervical cancer screen  2016    Chlamydia screen  2020    Flu vaccine (Season Ended) 2020    DTaP/Tdap/Td vaccine (2 - Td) 2026    HIV screen  Completed    Hepatitis A vaccine  Aged Out    Hepatitis B vaccine  Aged Out    Hib vaccine  Aged Out    Meningococcal (ACWY) vaccine  Aged Out       VITALS:  Vitals:    20 1509   Pulse: 88   Temp: 98.6 °F (37 °C)   SpO2: 98%       Physical Exam:  [x]Alert  [x]Oriented to person/place/time

## 2020-05-01 NOTE — PATIENT INSTRUCTIONS
Advance Care Planning  People with COVID-19 may have no symptoms, mild symptoms, such as fever, cough, and shortness of breath or they may have more severe illness, developing severe and fatal pneumonia. As a result, Advance Care Planning with attention to naming a health care decision maker (someone you trust to make healthcare decisions for you if you could not speak for yourself) and sharing other health care preferences is important BEFORE a possible health crisis. Please contact your Primary Care Provider to discuss Advance Care Planning. Preventing the Spread of Coronavirus Disease 2019 in Homes and Residential Communities  For the most recent information go to [email or web address]    Prevention steps for People with confirmed or suspected COVID-19 (including persons under investigation) who do not need to be hospitalized  and   People with confirmed COVID-19 who were hospitalized and determined to be medically stable to go home    Your healthcare provider and public health staff will evaluate whether you can be cared for at home. If it is determined that you do not need to be hospitalized and can be isolated at home, you will be monitored by staff from your local or state health department. You should follow the prevention steps below until a healthcare provider or local or state health department says you can return to your normal activities. Stay home except to get medical care  People who are mildly ill with COVID-19 are able to isolate at home during their illness. You should restrict activities outside your home, except for getting medical care. Do not go to work, school, or public areas. Avoid using public transportation, ride-sharing, or taxis. Separate yourself from other people and animals in your home  People: As much as possible, you should stay in a specific room and away from other people in your home.  Also, you should use a separate bathroom, if available. Animals: You should restrict contact with pets and other animals while you are sick with COVID-19, just like you would around other people. Although there have not been reports of pets or other animals becoming sick with COVID-19, it is still recommended that people sick with COVID-19 limit contact with animals until more information is known about the virus. When possible, have another member of your household care for your animals while you are sick. If you are sick with COVID-19, avoid contact with your pet, including petting, snuggling, being kissed or licked, and sharing food. If you must care for your pet or be around animals while you are sick, wash your hands before and after you interact with pets and wear a facemask. Call ahead before visiting your doctor  If you have a medical appointment, call the healthcare provider and tell them that you have or may have COVID-19. This will help the healthcare providers office take steps to keep other people from getting infected or exposed. Wear a facemask  You should wear a facemask when you are around other people (e.g., sharing a room or vehicle) or pets and before you enter a healthcare providers office. If you are not able to wear a facemask (for example, because it causes trouble breathing), then people who live with you should not stay in the same room with you, or they should wear a facemask if they enter your room. Cover your coughs and sneezes  Cover your mouth and nose with a tissue when you cough or sneeze. Throw used tissues in a lined trash can. Immediately wash your hands with soap and water for at least 20 seconds or, if soap and water are not available, clean your hands with an alcohol-based hand  that contains at least 60% alcohol.   Clean your hands often  Wash your hands often with soap and water for at least 20 seconds, especially after blowing your nose, coughing, or sneezing; going to the bathroom; and have a medical emergency and need to call 911, notify the dispatch personnel that you have, or are being evaluated for COVID-19. If possible, put on a facemask before emergency medical services arrive. Discontinuing home isolation  Patients with confirmed COVID-19 should remain under home isolation precautions until the risk of secondary transmission to others is thought to be low. The decision to discontinue home isolation precautions should be made on a case-by-case basis, in consultation with healthcare providers and state and local health departments. The medication list included in this document is our record of what you are currently taking, including any changes that were made at today's visit.  If you find any differences when compared to your medications at home, or have any questions that were not answered at your visit, please contact the office.     Roland Langley MD

## 2020-05-03 LAB
SARS-COV-2: NOT DETECTED
SOURCE: NORMAL

## 2020-06-19 ENCOUNTER — TELEPHONE (OUTPATIENT)
Dept: FAMILY MEDICINE CLINIC | Age: 25
End: 2020-06-19

## 2020-06-19 RX ORDER — ONDANSETRON 4 MG/1
4 TABLET, ORALLY DISINTEGRATING ORAL EVERY 8 HOURS PRN
Qty: 10 TABLET | Refills: 0 | Status: SHIPPED | OUTPATIENT
Start: 2020-06-19 | End: 2020-06-23 | Stop reason: SDUPTHER

## 2020-06-21 ENCOUNTER — APPOINTMENT (OUTPATIENT)
Dept: CT IMAGING | Age: 25
End: 2020-06-21
Payer: MEDICARE

## 2020-06-21 ENCOUNTER — TELEPHONE (OUTPATIENT)
Dept: FAMILY MEDICINE CLINIC | Age: 25
End: 2020-06-21

## 2020-06-21 ENCOUNTER — HOSPITAL ENCOUNTER (EMERGENCY)
Age: 25
Discharge: HOME | End: 2020-06-21
Payer: MEDICARE

## 2020-06-21 VITALS
DIASTOLIC BLOOD PRESSURE: 74 MMHG | SYSTOLIC BLOOD PRESSURE: 121 MMHG | WEIGHT: 164.02 LBS | TEMPERATURE: 98.7 F | RESPIRATION RATE: 16 BRPM | OXYGEN SATURATION: 98 % | BODY MASS INDEX: 28 KG/M2 | HEART RATE: 74 BPM | HEIGHT: 64 IN

## 2020-06-21 LAB
A/G RATIO: 2 (ref 1.1–2.2)
ALBUMIN SERPL-MCNC: 4.6 G/DL (ref 3.4–5)
ALP BLD-CCNC: 49 U/L (ref 40–129)
ALT SERPL-CCNC: 11 U/L (ref 10–40)
ANION GAP SERPL CALCULATED.3IONS-SCNC: 11 MMOL/L (ref 3–16)
AST SERPL-CCNC: 14 U/L (ref 15–37)
BASOPHILS ABSOLUTE: 0.1 K/UL (ref 0–0.2)
BASOPHILS RELATIVE PERCENT: 1.2 %
BILIRUB SERPL-MCNC: 1.3 MG/DL (ref 0–1)
BILIRUBIN URINE: NEGATIVE
BLOOD, URINE: NEGATIVE
BUN BLDV-MCNC: 9 MG/DL (ref 7–20)
CALCIUM SERPL-MCNC: 9.1 MG/DL (ref 8.3–10.6)
CHLORIDE BLD-SCNC: 106 MMOL/L (ref 99–110)
CLARITY: CLEAR
CO2: 21 MMOL/L (ref 21–32)
COLOR: YELLOW
CREAT SERPL-MCNC: 0.6 MG/DL (ref 0.6–1.1)
EOSINOPHILS ABSOLUTE: 0.3 K/UL (ref 0–0.6)
EOSINOPHILS RELATIVE PERCENT: 4.1 %
GFR AFRICAN AMERICAN: >60
GFR NON-AFRICAN AMERICAN: >60
GLOBULIN: 2.3 G/DL
GLUCOSE BLD-MCNC: 81 MG/DL (ref 70–99)
GLUCOSE URINE: NEGATIVE MG/DL
HCG QUALITATIVE: NEGATIVE
HCT VFR BLD CALC: 45.4 % (ref 36–48)
HEMOGLOBIN: 15.5 G/DL (ref 12–16)
KETONES, URINE: NEGATIVE MG/DL
LEUKOCYTE ESTERASE, URINE: NEGATIVE
LIPASE: 17 U/L (ref 13–60)
LYMPHOCYTES ABSOLUTE: 1.7 K/UL (ref 1–5.1)
LYMPHOCYTES RELATIVE PERCENT: 26.2 %
MCH RBC QN AUTO: 31.4 PG (ref 26–34)
MCHC RBC AUTO-ENTMCNC: 34.1 G/DL (ref 31–36)
MCV RBC AUTO: 91.8 FL (ref 80–100)
MICROSCOPIC EXAMINATION: NORMAL
MONOCYTES ABSOLUTE: 0.5 K/UL (ref 0–1.3)
MONOCYTES RELATIVE PERCENT: 8.2 %
NEUTROPHILS ABSOLUTE: 4 K/UL (ref 1.7–7.7)
NEUTROPHILS RELATIVE PERCENT: 60.3 %
NITRITE, URINE: NEGATIVE
OCCULT BLOOD DIAGNOSTIC: NORMAL
PDW BLD-RTO: 13.3 % (ref 12.4–15.4)
PH UA: 7 (ref 5–8)
PLATELET # BLD: 207 K/UL (ref 135–450)
PMV BLD AUTO: 8.8 FL (ref 5–10.5)
POTASSIUM REFLEX MAGNESIUM: 4.1 MMOL/L (ref 3.5–5.1)
PROTEIN UA: NEGATIVE MG/DL
RBC # BLD: 4.94 M/UL (ref 4–5.2)
SODIUM BLD-SCNC: 138 MMOL/L (ref 136–145)
SPECIFIC GRAVITY UA: 1.02 (ref 1–1.03)
TOTAL PROTEIN: 6.9 G/DL (ref 6.4–8.2)
URINE REFLEX TO CULTURE: NORMAL
URINE TYPE: NORMAL
UROBILINOGEN, URINE: 1 E.U./DL
WBC # BLD: 6.6 K/UL (ref 4–11)

## 2020-06-21 PROCEDURE — 84703 CHORIONIC GONADOTROPIN ASSAY: CPT

## 2020-06-21 PROCEDURE — G0328 FECAL BLOOD SCRN IMMUNOASSAY: HCPCS

## 2020-06-21 PROCEDURE — 6370000000 HC RX 637 (ALT 250 FOR IP): Performed by: PHYSICIAN ASSISTANT

## 2020-06-21 PROCEDURE — 85025 COMPLETE CBC W/AUTO DIFF WBC: CPT

## 2020-06-21 PROCEDURE — 96374 THER/PROPH/DIAG INJ IV PUSH: CPT

## 2020-06-21 PROCEDURE — 6360000004 HC RX CONTRAST MEDICATION: Performed by: PHYSICIAN ASSISTANT

## 2020-06-21 PROCEDURE — 74177 CT ABD & PELVIS W/CONTRAST: CPT

## 2020-06-21 PROCEDURE — 81003 URINALYSIS AUTO W/O SCOPE: CPT

## 2020-06-21 PROCEDURE — 6360000002 HC RX W HCPCS: Performed by: PHYSICIAN ASSISTANT

## 2020-06-21 PROCEDURE — 83690 ASSAY OF LIPASE: CPT

## 2020-06-21 PROCEDURE — 96375 TX/PRO/DX INJ NEW DRUG ADDON: CPT

## 2020-06-21 PROCEDURE — 99284 EMERGENCY DEPT VISIT MOD MDM: CPT

## 2020-06-21 PROCEDURE — 80053 COMPREHEN METABOLIC PANEL: CPT

## 2020-06-21 PROCEDURE — 2580000003 HC RX 258: Performed by: PHYSICIAN ASSISTANT

## 2020-06-21 RX ORDER — 0.9 % SODIUM CHLORIDE 0.9 %
1000 INTRAVENOUS SOLUTION INTRAVENOUS ONCE
Status: COMPLETED | OUTPATIENT
Start: 2020-06-21 | End: 2020-06-21

## 2020-06-21 RX ORDER — ONDANSETRON 2 MG/ML
4 INJECTION INTRAMUSCULAR; INTRAVENOUS ONCE
Status: COMPLETED | OUTPATIENT
Start: 2020-06-21 | End: 2020-06-21

## 2020-06-21 RX ORDER — HYDROCODONE BITARTRATE AND ACETAMINOPHEN 5; 325 MG/1; MG/1
1 TABLET ORAL ONCE
Status: COMPLETED | OUTPATIENT
Start: 2020-06-21 | End: 2020-06-21

## 2020-06-21 RX ORDER — FAMOTIDINE 20 MG/1
20 TABLET, FILM COATED ORAL 2 TIMES DAILY
Qty: 60 TABLET | Refills: 0 | Status: SHIPPED | OUTPATIENT
Start: 2020-06-21 | End: 2020-08-05

## 2020-06-21 RX ORDER — DICYCLOMINE HYDROCHLORIDE 10 MG/1
10 CAPSULE ORAL EVERY 6 HOURS PRN
Qty: 20 CAPSULE | Refills: 0 | Status: SHIPPED | OUTPATIENT
Start: 2020-06-21

## 2020-06-21 RX ADMIN — HYDROMORPHONE HYDROCHLORIDE 0.5 MG: 1 INJECTION, SOLUTION INTRAMUSCULAR; INTRAVENOUS; SUBCUTANEOUS at 15:39

## 2020-06-21 RX ADMIN — HYDROCODONE BITARTRATE AND ACETAMINOPHEN 1 TABLET: 5; 325 TABLET ORAL at 17:55

## 2020-06-21 RX ADMIN — IOPAMIDOL 75 ML: 755 INJECTION, SOLUTION INTRAVENOUS at 16:13

## 2020-06-21 RX ADMIN — SODIUM CHLORIDE 1000 ML: 9 INJECTION, SOLUTION INTRAVENOUS at 15:38

## 2020-06-21 RX ADMIN — ONDANSETRON 4 MG: 2 INJECTION INTRAMUSCULAR; INTRAVENOUS at 15:38

## 2020-06-21 ASSESSMENT — PAIN SCALES - GENERAL
PAINLEVEL_OUTOF10: 6
PAINLEVEL_OUTOF10: 6
PAINLEVEL_OUTOF10: 8
PAINLEVEL_OUTOF10: 6
PAINLEVEL_OUTOF10: 8

## 2020-06-21 ASSESSMENT — ENCOUNTER SYMPTOMS
COLOR CHANGE: 0
VOMITING: 1
DIARRHEA: 1
NAUSEA: 1
ABDOMINAL PAIN: 1
RECTAL PAIN: 0
COUGH: 0
CONSTIPATION: 0
SHORTNESS OF BREATH: 0
BLOOD IN STOOL: 1

## 2020-06-21 ASSESSMENT — PAIN - FUNCTIONAL ASSESSMENT
PAIN_FUNCTIONAL_ASSESSMENT: PREVENTS OR INTERFERES SOME ACTIVE ACTIVITIES AND ADLS
PAIN_FUNCTIONAL_ASSESSMENT: PREVENTS OR INTERFERES SOME ACTIVE ACTIVITIES AND ADLS
PAIN_FUNCTIONAL_ASSESSMENT: 0-10

## 2020-06-21 ASSESSMENT — PAIN DESCRIPTION - ONSET: ONSET: ON-GOING

## 2020-06-21 ASSESSMENT — PAIN DESCRIPTION - FREQUENCY
FREQUENCY: CONTINUOUS
FREQUENCY: CONTINUOUS

## 2020-06-21 ASSESSMENT — PAIN DESCRIPTION - LOCATION
LOCATION: EAR
LOCATION: ABDOMEN

## 2020-06-21 ASSESSMENT — PAIN DESCRIPTION - PROGRESSION
CLINICAL_PROGRESSION: GRADUALLY WORSENING
CLINICAL_PROGRESSION: GRADUALLY WORSENING

## 2020-06-21 ASSESSMENT — PAIN DESCRIPTION - PAIN TYPE
TYPE: ACUTE PAIN
TYPE: ACUTE PAIN

## 2020-06-21 ASSESSMENT — PAIN DESCRIPTION - DESCRIPTORS
DESCRIPTORS: ACHING
DESCRIPTORS: ACHING;STABBING;SHARP

## 2020-06-21 NOTE — ED PROVIDER NOTES
[facility]        Pt Name: Rubina Cavazos  MRN: [id number]  [place] 1995  Date of evaluation: 2020  Provider: LOVELY Ellis  PCP: Gautam Hare MD    Evaluation by MANJIT. My supervising physician was available for consultation. CHIEF COMPLAINT       Chief Complaint   Patient presents with    Abdominal Pain     patient c/o worsening abdominal pain x 2 weeks. states  + decreased appetite, + nausea/vomitiing. to ED for eval       HISTORY OF PRESENT ILLNESS   (Location, Timing/Onset, Context/Setting, Quality, Duration, Modifying Factors, Severity, Associated Signs and Symptoms)  Note limiting factors. Rubina Cavazos is a 25 y.o. female presents the emergency department today with complaints of abdominal pain, dark stools. She states that her symptoms started about 1-1/2 months ago but have been getting worse over the course of last 2 weeks. She states that early in May, she called her primary care provider to discuss her abdominal pain and dark stools. At that time she had been taking some anti-inflammatories. He advised her to stop taking those and started her on Protonix. She states the Protonix caused her to have diarrhea, which she is continuing to have. She states that her stool still dark. She denies any recent Pepto-Bismol use, or iron supplements. She states that over the last 2 weeks she has had increasing abdominal cramping, nausea, vomiting. She thinks that her vomiting has had some clear material with some red specks in it. She states that she does does consume alcohol, every other weekend has 3 white claws. Denies other alcohol use. Prev abdominal surgery includes tubal ligation (2020), cholecystectomy (2016),  (x2). Denies ever seeing a GI physician. Denies any history of colonscopy, EGD. Denies any family h/o colon cancer that she is aware of.   Drove herself here mLs Intravenous Given 6/21/20 1613)   HYDROcodone-acetaminophen (NORCO) 5-325 MG per tablet 1 tablet (1 tablet Oral Given 6/21/20 6895)       ED COURSE & MEDICAL DECISION MAKING    Pertinent Labs & Imaging studies reviewed. (See chart for details)   -  Patient seen and evaluated in the emergency department. -  Triage and nursing notes reviewed and incorporated. -  Old chart records reviewed and incorporated. -  Evaluation by MANJIT. My supervising physician was available for consultation.  -  Differential diagnosis includes: kidney stone, pyelonephritis, UTI, appendicitis, bowel obstruction, diverticulitis, hernia, gastritis/gastroenteritis, pancreatitis, cholecystitis, hepatitis, constipation, IBS, IBD  -  Work-up included:  See above  -  ED treatment included:  dilaudid, zofran, iv fluids, norco  -  Results discussed with patient. Labs show no leukocytosis or concerning anemia. There are no concerning electrolyte changes. Her lipase is not elevated. She is not pregnant. Her stool is negative for blood. Her urine shows no evidence of infection. Imaging studies show no acute process within the abdomen or pelvis. Patient feels improved after medications, but is requesting another dose prior to discharge - she is given a dose of Norco.  At this time, we recommend discharge with outpatient GI follow up. A referral is given. She is not tolerating the protonix, is given prescription for pepcid, bentyl. We gave very strict return precautions. The patient is agreeable with plan of care and disposition.  -  Disposition:  Admission    FINAL IMPRESSION      1. Generalized abdominal pain          DISPOSITION/PLAN   DISPOSITION        PATIENT REFERREDTO:  Marlene Bergeron [facility] [address]  [phone]    Schedule an appointment as soon as possible for a visit       Vaishnavi Christianson MD  [address] [facility]  [address] [facility]    Schedule an appointment as soon as

## 2020-06-21 NOTE — TELEPHONE ENCOUNTER
Was having black stools 1-1/2 months ago. Was having stomach pains. Has trouble getting out of bed because of stomach pain. She is also lightheaded when she stands up. She was vomiting but it is clear. Stools are black. She was on ibuprofen 600 mg -but not since March. Not taking over-the-counter ibuprofen Aleve or aspirin. She uses Tylenol for pain. She had been on Protonix but it was causing her more stomach pain she thought. Explained the side effects of Protonix are usually in the intestines if any and not the stomach itself unless the pill passing over a raw area in the esophagus hurts. A/P Melena  To [facility] - called to inform them.

## 2020-06-23 ENCOUNTER — VIRTUAL VISIT (OUTPATIENT)
Dept: FAMILY MEDICINE CLINIC | Age: 25
End: 2020-06-23
Payer: MEDICARE

## 2020-06-23 PROCEDURE — 99213 OFFICE O/P EST LOW 20 MIN: CPT | Performed by: FAMILY MEDICINE

## 2020-06-23 PROCEDURE — G8427 DOCREV CUR MEDS BY ELIG CLIN: HCPCS | Performed by: FAMILY MEDICINE

## 2020-06-23 RX ORDER — ONDANSETRON 4 MG/1
4 TABLET, ORALLY DISINTEGRATING ORAL EVERY 8 HOURS PRN
Qty: 30 TABLET | Refills: 0 | Status: SHIPPED | OUTPATIENT
Start: 2020-06-23 | End: 2021-03-05 | Stop reason: ALTCHOICE

## 2020-06-23 RX ORDER — LITHIUM CARBONATE 300 MG/1
1 CAPSULE ORAL 4 TIMES DAILY
COMMUNITY
Start: 2020-06-09

## 2020-06-23 RX ORDER — TRAMADOL HYDROCHLORIDE 50 MG/1
50 TABLET ORAL EVERY 6 HOURS PRN
Qty: 60 TABLET | Refills: 0 | Status: SHIPPED | OUTPATIENT
Start: 2020-06-23 | End: 2020-07-13

## 2020-06-24 ENCOUNTER — TELEPHONE (OUTPATIENT)
Dept: ADMINISTRATIVE | Age: 25
End: 2020-06-24

## 2020-12-03 ENCOUNTER — HOSPITAL ENCOUNTER (EMERGENCY)
Age: 25
Discharge: HOME | End: 2020-12-03
Attending: EMERGENCY MEDICINE
Payer: MEDICARE

## 2020-12-03 ENCOUNTER — TELEPHONE (OUTPATIENT)
Dept: FAMILY MEDICINE CLINIC | Age: 25
End: 2020-12-03

## 2020-12-03 ENCOUNTER — APPOINTMENT (OUTPATIENT)
Dept: CT IMAGING | Age: 25
End: 2020-12-03
Payer: MEDICARE

## 2020-12-03 ENCOUNTER — APPOINTMENT (OUTPATIENT)
Dept: ULTRASOUND IMAGING | Age: 25
End: 2020-12-03
Payer: MEDICARE

## 2020-12-03 VITALS
TEMPERATURE: 98.8 F | OXYGEN SATURATION: 100 % | HEART RATE: 71 BPM | SYSTOLIC BLOOD PRESSURE: 100 MMHG | DIASTOLIC BLOOD PRESSURE: 54 MMHG | WEIGHT: 167.11 LBS | HEIGHT: 64 IN | RESPIRATION RATE: 18 BRPM | BODY MASS INDEX: 28.53 KG/M2

## 2020-12-03 LAB
A/G RATIO: 1.9 (ref 1.1–2.2)
ALBUMIN SERPL-MCNC: 4.6 G/DL (ref 3.4–5)
ALP BLD-CCNC: 47 U/L (ref 40–129)
ALT SERPL-CCNC: 14 U/L (ref 10–40)
ANION GAP SERPL CALCULATED.3IONS-SCNC: 9 MMOL/L (ref 3–16)
AST SERPL-CCNC: 15 U/L (ref 15–37)
BACTERIA WET PREP: NORMAL
BACTERIA: ABNORMAL /HPF
BASOPHILS ABSOLUTE: 0.1 K/UL (ref 0–0.2)
BASOPHILS RELATIVE PERCENT: 0.8 %
BILIRUB SERPL-MCNC: 1.2 MG/DL (ref 0–1)
BILIRUBIN URINE: NEGATIVE
BLOOD, URINE: ABNORMAL
BUN BLDV-MCNC: 9 MG/DL (ref 7–20)
CALCIUM SERPL-MCNC: 9.3 MG/DL (ref 8.3–10.6)
CHLORIDE BLD-SCNC: 109 MMOL/L (ref 99–110)
CLARITY: ABNORMAL
CLUE CELLS: NORMAL
CO2: 24 MMOL/L (ref 21–32)
COLOR: ABNORMAL
COMMENT UA: ABNORMAL
CREAT SERPL-MCNC: 0.6 MG/DL (ref 0.6–1.1)
EOSINOPHILS ABSOLUTE: 0.3 K/UL (ref 0–0.6)
EOSINOPHILS RELATIVE PERCENT: 4.3 %
EPITHELIAL CELLS WET PREP: NORMAL
EPITHELIAL CELLS, UA: 13 /HPF (ref 0–5)
GFR AFRICAN AMERICAN: >60
GFR NON-AFRICAN AMERICAN: >60
GLOBULIN: 2.4 G/DL
GLUCOSE BLD-MCNC: 90 MG/DL (ref 70–99)
GLUCOSE URINE: NEGATIVE MG/DL
HCG QUALITATIVE: NEGATIVE
HCT VFR BLD CALC: 42.2 % (ref 36–48)
HEMOGLOBIN: 14.4 G/DL (ref 12–16)
KETONES, URINE: NEGATIVE MG/DL
LEUKOCYTE ESTERASE, URINE: NEGATIVE
LIPASE: 19 U/L (ref 13–60)
LYMPHOCYTES ABSOLUTE: 1.8 K/UL (ref 1–5.1)
LYMPHOCYTES RELATIVE PERCENT: 27.3 %
MCH RBC QN AUTO: 30.9 PG (ref 26–34)
MCHC RBC AUTO-ENTMCNC: 34.2 G/DL (ref 31–36)
MCV RBC AUTO: 90.4 FL (ref 80–100)
MICROSCOPIC EXAMINATION: YES
MONOCYTES ABSOLUTE: 0.5 K/UL (ref 0–1.3)
MONOCYTES RELATIVE PERCENT: 8.4 %
NEUTROPHILS ABSOLUTE: 3.8 K/UL (ref 1.7–7.7)
NEUTROPHILS RELATIVE PERCENT: 59.2 %
NITRITE, URINE: NEGATIVE
PDW BLD-RTO: 13.1 % (ref 12.4–15.4)
PH UA: 5.5 (ref 5–8)
PLATELET # BLD: 212 K/UL (ref 135–450)
PMV BLD AUTO: 8.3 FL (ref 5–10.5)
POTASSIUM REFLEX MAGNESIUM: 4 MMOL/L (ref 3.5–5.1)
PROTEIN UA: ABNORMAL MG/DL
RBC # BLD: 4.66 M/UL (ref 4–5.2)
RBC UA: ABNORMAL /HPF (ref 0–4)
RBC WET PREP: NORMAL
SODIUM BLD-SCNC: 142 MMOL/L (ref 136–145)
SOURCE WET PREP: NORMAL
SPECIFIC GRAVITY UA: 1.03 (ref 1–1.03)
TOTAL PROTEIN: 7 G/DL (ref 6.4–8.2)
TRICHOMONAS PREP: NORMAL
URINE REFLEX TO CULTURE: YES
URINE TYPE: ABNORMAL
UROBILINOGEN, URINE: 0.2 E.U./DL
WBC # BLD: 6.5 K/UL (ref 4–11)
WBC UA: 15 /HPF (ref 0–5)
WBC WET PREP: NORMAL
YEAST WET PREP: NORMAL

## 2020-12-03 PROCEDURE — 74177 CT ABD & PELVIS W/CONTRAST: CPT

## 2020-12-03 PROCEDURE — 96375 TX/PRO/DX INJ NEW DRUG ADDON: CPT

## 2020-12-03 PROCEDURE — 83690 ASSAY OF LIPASE: CPT

## 2020-12-03 PROCEDURE — 96374 THER/PROPH/DIAG INJ IV PUSH: CPT

## 2020-12-03 PROCEDURE — 80053 COMPREHEN METABOLIC PANEL: CPT

## 2020-12-03 PROCEDURE — 87210 SMEAR WET MOUNT SALINE/INK: CPT

## 2020-12-03 PROCEDURE — 6360000002 HC RX W HCPCS: Performed by: PHYSICIAN ASSISTANT

## 2020-12-03 PROCEDURE — 87491 CHLMYD TRACH DNA AMP PROBE: CPT

## 2020-12-03 PROCEDURE — 87086 URINE CULTURE/COLONY COUNT: CPT

## 2020-12-03 PROCEDURE — 2580000003 HC RX 258: Performed by: PHYSICIAN ASSISTANT

## 2020-12-03 PROCEDURE — 6370000000 HC RX 637 (ALT 250 FOR IP): Performed by: PHYSICIAN ASSISTANT

## 2020-12-03 PROCEDURE — 96376 TX/PRO/DX INJ SAME DRUG ADON: CPT

## 2020-12-03 PROCEDURE — 99283 EMERGENCY DEPT VISIT LOW MDM: CPT

## 2020-12-03 PROCEDURE — 6360000004 HC RX CONTRAST MEDICATION: Performed by: PHYSICIAN ASSISTANT

## 2020-12-03 PROCEDURE — 81001 URINALYSIS AUTO W/SCOPE: CPT

## 2020-12-03 PROCEDURE — 87591 N.GONORRHOEAE DNA AMP PROB: CPT

## 2020-12-03 PROCEDURE — 85025 COMPLETE CBC W/AUTO DIFF WBC: CPT

## 2020-12-03 PROCEDURE — 84703 CHORIONIC GONADOTROPIN ASSAY: CPT

## 2020-12-03 PROCEDURE — 76830 TRANSVAGINAL US NON-OB: CPT

## 2020-12-03 RX ORDER — 0.9 % SODIUM CHLORIDE 0.9 %
1000 INTRAVENOUS SOLUTION INTRAVENOUS ONCE
Status: COMPLETED | OUTPATIENT
Start: 2020-12-03 | End: 2020-12-03

## 2020-12-03 RX ORDER — ONDANSETRON 2 MG/ML
4 INJECTION INTRAMUSCULAR; INTRAVENOUS ONCE
Status: COMPLETED | OUTPATIENT
Start: 2020-12-03 | End: 2020-12-03

## 2020-12-03 RX ORDER — ACETAMINOPHEN 500 MG
500 TABLET ORAL 4 TIMES DAILY PRN
Qty: 120 TABLET | Refills: 0 | Status: ON HOLD | OUTPATIENT
Start: 2020-12-03 | End: 2021-06-02 | Stop reason: SDUPTHER

## 2020-12-03 RX ORDER — HYDROCODONE BITARTRATE AND ACETAMINOPHEN 5; 325 MG/1; MG/1
1 TABLET ORAL ONCE
Status: COMPLETED | OUTPATIENT
Start: 2020-12-03 | End: 2020-12-03

## 2020-12-03 RX ADMIN — HYDROMORPHONE HYDROCHLORIDE 0.5 MG: 1 INJECTION, SOLUTION INTRAMUSCULAR; INTRAVENOUS; SUBCUTANEOUS at 15:45

## 2020-12-03 RX ADMIN — IOPAMIDOL 75 ML: 755 INJECTION, SOLUTION INTRAVENOUS at 15:49

## 2020-12-03 RX ADMIN — ONDANSETRON 4 MG: 2 INJECTION INTRAMUSCULAR; INTRAVENOUS at 15:45

## 2020-12-03 RX ADMIN — SODIUM CHLORIDE 1000 ML: 9 INJECTION, SOLUTION INTRAVENOUS at 15:45

## 2020-12-03 RX ADMIN — HYDROCODONE BITARTRATE AND ACETAMINOPHEN 1 TABLET: 5; 325 TABLET ORAL at 18:37

## 2020-12-03 RX ADMIN — HYDROMORPHONE HYDROCHLORIDE 0.5 MG: 1 INJECTION, SOLUTION INTRAMUSCULAR; INTRAVENOUS; SUBCUTANEOUS at 16:53

## 2020-12-03 ASSESSMENT — PAIN DESCRIPTION - ORIENTATION
ORIENTATION: LEFT;LOWER
ORIENTATION: LEFT;LOWER

## 2020-12-03 ASSESSMENT — ENCOUNTER SYMPTOMS
DIARRHEA: 0
VOMITING: 1
SHORTNESS OF BREATH: 0
ABDOMINAL PAIN: 0
COUGH: 0
NAUSEA: 1
CONSTIPATION: 0

## 2020-12-03 ASSESSMENT — PAIN DESCRIPTION - PAIN TYPE
TYPE: ACUTE PAIN
TYPE: ACUTE PAIN

## 2020-12-03 ASSESSMENT — PAIN - FUNCTIONAL ASSESSMENT: PAIN_FUNCTIONAL_ASSESSMENT: PREVENTS OR INTERFERES SOME ACTIVE ACTIVITIES AND ADLS

## 2020-12-03 ASSESSMENT — PAIN DESCRIPTION - ONSET
ONSET: GRADUAL
ONSET: ON-GOING

## 2020-12-03 ASSESSMENT — PAIN DESCRIPTION - DESCRIPTORS
DESCRIPTORS: CRAMPING;SHARP
DESCRIPTORS: CRAMPING;SHARP

## 2020-12-03 ASSESSMENT — PAIN DESCRIPTION - LOCATION
LOCATION: ABDOMEN
LOCATION: ABDOMEN

## 2020-12-03 ASSESSMENT — PAIN DESCRIPTION - PROGRESSION
CLINICAL_PROGRESSION: GRADUALLY WORSENING
CLINICAL_PROGRESSION: GRADUALLY IMPROVING

## 2020-12-03 ASSESSMENT — PAIN SCALES - GENERAL
PAINLEVEL_OUTOF10: 8
PAINLEVEL_OUTOF10: 8
PAINLEVEL_OUTOF10: 6
PAINLEVEL_OUTOF10: 10
PAINLEVEL_OUTOF10: 4

## 2020-12-03 ASSESSMENT — PAIN DESCRIPTION - FREQUENCY
FREQUENCY: CONTINUOUS
FREQUENCY: CONTINUOUS

## 2020-12-03 NOTE — ED PROVIDER NOTES
Pulse: 71, Resp: 18, SpO2: 100 %     Patient was given the following medications:  Orders Placed This Encounter   Medications    HYDROmorphone (DILAUDID) injection 0.5 mg    ondansetron (ZOFRAN) injection 4 mg    0.9 % sodium chloride bolus    iopamidol (ISOVUE-370) 76 % injection 75 mL    HYDROmorphone (DILAUDID) injection 0.5 mg    HYDROcodone-acetaminophen (NORCO) 5-325 MG per tablet 1 tablet    acetaminophen (TYLENOL) 500 MG tablet     Sig: Take 1 tablet by mouth 4 times daily as needed for Pain     Dispense:  120 tablet     Refill:  0     FINAL IMPRESSION      1.  Pelvic pain in female        DISPOSITION/PLAN   DISPOSITION Decision To Discharge 12/03/2020 06:44:38 PM      PATIENT REFERRED TO:  Marlene Booth [facility] [address]  [phone]    Schedule an appointment as soon as possible for a visit       Dariusz Stevenson MD  [address] [place] [address]  [phone]    Schedule an appointment as soon as possible for a visit       [facility] Emergency Department  [address] [place] [address]  [phone]    If symptoms worsen      DISCHARGE MEDICATIONS:  New Prescriptions    ACETAMINOPHEN (TYLENOL) 500 MG TABLET    Take 1 tablet by mouth 4 times daily as needed for Pain            (Please note that portions of this note were completed with a voice recognition program. Efforts were made to edit the dictations but occasionally words are mis-transcribed.)    Georgia Breen MD (electronically signed)  Attending Emergency Physician        Georgia Breen MD  12/03/20 0946

## 2020-12-03 NOTE — TELEPHONE ENCOUNTER
Patient is having a lot of abdominal pain going down into her pelvis. Says she felt something pop during the night. Says she called this AM but no message in chart.   Please call ASAP

## 2020-12-03 NOTE — TELEPHONE ENCOUNTER
NO MESSAGE AND NO BODY CALLED ON CALL. HAPPENED 0300 FELT SOMETHING POP ON LEFT SIDE OF STOMACH. SHE TOOK IBUPROFEN, WOKE UP AGAIN IN EXTREME PAIN ON LEFT SIDE DOWN INTO PELVIS AREA PAIN IS CONSTANT DOES NOT GO AWAY. WORSE WHEN UP AND MOVING PAIN IS IN LOWER ABDOMINAL AREA. NO URINARY SX. JUST GOT OFF HER PERIOD I DO NOT HAVE ANY OPEN APPTS ARTUR STATED CALL PCP. Estrada Kilgore

## 2020-12-03 NOTE — ED PROVIDER NOTES
[address] [facility]        Pt Name: Lurdes Howell  MRN: [id number]  [place] 1995  Date of evaluation: 12/3/2020  Provider: LOVELY Márquez  PCP: Bharat Werner MD     I have seen and evaluated this patient with my supervising physician Tata Ferraro MD.    [address] [place] [address]       Chief Complaint   Patient presents with    Abdominal Pain     woke up around 0330 this morning. LLQ pain. HISTORY OF PRESENT ILLNESS   (Location, Timing/Onset, Context/Setting, Quality, Duration, Modifying Factors, Severity, Associated Signs and Symptoms)  Note limiting factors. Lurdes Howell is a 22 y.o. female presents to the ER today with complaints of LLQ pain and vaginal bleeding. She states the symptoms started around 3:00 this morning, she felt some pain in her left lower quadrant, and then felt \"it pop\". She states she called her PCP who recommended that she come to the ER for evaluation. Patient states she does not have any urinary symptoms, but she has noticed she has started bleeding. She states she just stopped her cycle a few days ago, but that it started bleeding again this morning. She has no bowel changes. She denies chest pain, fever, chills. She did try taking some ibuprofen 800 mg, but it did not help to improve her pain. She also reports that she tried taking some tramadol she had at home, but that also did not help to improve her pain. She has no further complaints at this time. Nursing Notes were all reviewed and agreed with or any disagreements were addressed in the HPI. REVIEW OF SYSTEMS    (2-9 systems for level 4, 10 or more for level 5)     Review of Systems   Constitutional: Negative for chills and fever. Respiratory: Negative for cough and shortness of breath. Cardiovascular: Negative for chest pain and palpitations. Gastrointestinal: Positive for nausea and vomiting.  Negative for abdominal pain, constipation and diarrhea. Genitourinary: Positive for pelvic pain and vaginal bleeding. Negative for dysuria, frequency and urgency. Positives and Pertinent negatives as per HPI. Except as noted above in the ROS, all other systems were reviewed and negative. PAST MEDICAL HISTORY     Past Medical History:   Diagnosis Date    Anxiety 2018    Depressive disorder 2018    History of pulmonary embolism 2017    implanted birth control?  Hx of blood clots     Hypertension     Migraines     Psychosis ([facility] [address] 2018    Smoker          SURGICAL HISTORY     Past Surgical History:   Procedure Laterality Date     SECTION  11/28/2011    X2, 2017 and 2011     SECTION  2017    CHOLECYSTECTOMY, LAPAROSCOPIC  2017    ROBOTIC XI ASSISTED LAPAROSCOPIC CHOLECYSTECTOMY WITH CHOLANGIOGRAM    TUBAL LIGATION           CURRENTMEDICATIONS       Discharge Medication List as of 12/3/2020  7:00 PM      CONTINUE these medications which have NOT CHANGED    Details   propranolol (INDERAL) 10 MG tablet Take 10 mg by mouth 3 times dailyHistorical Med      lithium 300 MG capsule Take 1 capsule by mouth 2 times dailyHistorical Med      ondansetron (ZOFRAN ODT) 4 MG disintegrating tablet Take 1 tablet by mouth every 8 hours as needed for Nausea Let dissolve in mouth., Disp-30 tablet, R-0Normal      dicyclomine (BENTYL) 10 MG capsule Take 1 capsule by mouth every 6 hours as needed (cramps), Disp-20 capsule, R-0Print      clonazePAM (KLONOPIN) 1 MG tablet Take 0.5-1 tablets by mouth 3 times daily as needed for Anxiety for up to 30 days. , Disp-75 tablet, R-0Normal      metoprolol succinate (TOPROL XL) 50 MG extended release tablet Take 0.5-1 tablets by mouth daily, Disp-30 tablet, R-2Normal      SUMAtriptan (IMITREX) 100 MG tablet Take 1 tablet by mouth once as needed for Migraine, Disp-9 tablet, R-1Normal               ALLERGIES     Etonogestrel;  Toradol [ketorolac tromethamine]; Morphine; and Naprosyn [naproxen]    FAMILYHISTORY       Family History   Problem Relation Age of Onset   [facility] Hypertension Father     Hypertension Mother     Bipolar Disorder Mother     Anxiety Disorder Mother     Depression Mother     Depression Sister     Depression Brother           SOCIAL HISTORY       Social History     Tobacco Use    Smoking status: Current Every Day Smoker     Packs/day: 1.00     Years: 7.00     Pack years: 7.00     Types: Cigarettes    Smokeless tobacco: Never Used    Tobacco comment: off and on since Feb   Substance Use Topics    Alcohol use: Yes     Comment: socially    Drug use: Never     Comment: occassionally       SCREENINGS             PHYSICAL EXAM    (up to 7 for level 4, 8 or more for level 5)     ED Triage Vitals [12/03/20 1404]   BP Temp Temp Source Pulse Resp SpO2 Height Weight   111/75 98.8 °F (37.1 °C) Temporal 71 16 (!) 8 % 5' 4\" (1.626 m) 167 lb 1.7 oz (75.8 kg)       Physical Exam  Vitals signs and nursing note reviewed. Constitutional:       General: She is not in acute distress. Appearance: Normal appearance. She is well-developed. She is not ill-appearing, toxic-appearing or diaphoretic. HENT:      Head: Normocephalic and atraumatic. Eyes:      Conjunctiva/sclera: Conjunctivae normal.      Pupils: Pupils are equal, round, and reactive to light. Pulmonary:      Effort: Pulmonary effort is normal. No respiratory distress. Breath sounds: Normal breath sounds. Abdominal:      General: Abdomen is flat. Bowel sounds are normal. There is no distension. Palpations: Abdomen is soft. Abdomen is not rigid. Tenderness: There is abdominal tenderness in the left lower quadrant. There is no guarding or rebound. Genitourinary:     Exam position: Supine. Labia:         Right: No rash, tenderness, lesion or injury. Left: No rash, tenderness, lesion or injury. Vagina: No signs of injury and foreign body. Bleeding present.  No vaginal discharge, erythema or tenderness. Cervix: No cervical motion tenderness, discharge or friability. Uterus: Not tender. Adnexa:         Right: No mass, tenderness or fullness. Left: Tenderness present. No mass or fullness. Comments:  exam performed with Samantha Barajas RN present at bedside. Skin:     General: Skin is warm and dry. Neurological:      Mental Status: She is alert and oriented to person, place, and time. Psychiatric:         Behavior: Behavior normal. Behavior is cooperative. Thought Content:  Thought content normal.         DIAGNOSTIC RESULTS   LABS:    Labs Reviewed   COMPREHENSIVE METABOLIC PANEL W/ REFLEX TO MG FOR LOW K - Abnormal; Notable for the following components:       Result Value    Total Bilirubin 1.2 (*)     All other components within normal limits    Narrative:     Performed at:  [address] [employer]   Phone [phone]   URINE RT REFLEX TO CULTURE - Abnormal; Notable for the following components:    Clarity, UA CLOUDY (*)     Blood, Urine LARGE (*)     Protein, UA TRACE (*)     All other components within normal limits    Narrative:     Performed at:  [address] [employer]   Phone [phone]   MICROSCOPIC URINALYSIS - Abnormal; Notable for the following components:    Bacteria, UA 2+ (*)     WBC, UA 15 (*)     Epithelial Cells, UA 13 (*)     All other components within normal limits    Narrative:     Performed at:  [address] [employer]   Phone [phone]   WET PREP, GENITAL    Narrative:     Performed at:  [address] [employer]   Phone [phone]   CULTURE, URINE   C.TRACHOMATIS N.GONORRHOEAE DNA   HCG, SERUM, QUALITATIVE    Narrative:     Performed at:  [facility]  [address] [place] [facility] [facility] [address]   Phone [phone]   CBC WITH AUTO DIFFERENTIAL    Narrative:     Performed at:  [facility]  [address] [facility] [address]   Phone [phone]   LIPASE    Narrative:     Performed at:  [facility]  [address] [facility] [address]   Phone [phone]       All other labs were within normal range or not returned as of this dictation. EKG: All EKG's are interpreted by the Emergency Department Physician in the absence of a cardiologist.  Please see their note for interpretation of EKG. RADIOLOGY:   Non-plain film images such as CT, Ultrasound and MRI are read by the radiologist. Plain radiographic images are visualized and preliminarily interpreted by the ED Provider with the below findings:        Interpretation per the Radiologist below, if available at the time of this note:    [address] [place] [address] Street   Final Result   Unremarkable pelvic ultrasound. Physiologic free fluid in the cul-de-sac and   adnexal regions. Normal Doppler flow within the ovaries. CT ABDOMEN PELVIS W IV CONTRAST Additional Contrast? None   Final Result   Small volume of free fluid in the pelvis, likely physiologic. Mild   follicular prominence of the bilateral ovaries. No other acute finding in the abdomen or pelvis. Us Non Ob Transvaginal    Result Date: 12/3/2020  EXAMINATION: PELVIC ULTRASOUND 12/3/2020 TECHNIQUE: Transvaginal pelvic ultrasound was performed. Color Doppler evaluation was performed. COMPARISON: CT 12/03/2020. Pelvic ultrasound 01/25/2019.  HISTORY: ORDERING SYSTEM PROVIDED HISTORY: pelvic pain, bleeding TECHNOLOGIST PROVIDED HISTORY: Reason for exam:->pelvic pain, bleeding FINDINGS: Measurements: Uterus:  7.7 x 4.9 x 4.5 cm Endometrial stripe:  5 mm Right Ovary:  3.4 x 2.6 x 2.1 cm Left Ovary:  3.9 x 1.8 x 1.3 cm Ultrasound Findings: Uterus: Uterus demonstrates normal myometrial echotexture. The uterus is retroverted. Endometrial stripe: Endometrial stripe is within normal limits. Minimal fluid in the endometrial canal and cervical region. Right Ovary: Right ovary is within normal limits. There is normal arterial and venous doppler flow. Left Ovary:  Left ovary is within normal limits. There is normal arterial and venous doppler flow. Free Fluid: Small amount of free fluid in the cul-de-sac and adnexa bilaterally. Unremarkable pelvic ultrasound. Physiologic free fluid in the cul-de-sac and adnexal regions. Normal Doppler flow within the ovaries. Ct Abdomen Pelvis W Iv Contrast Additional Contrast? None    Result Date: 12/3/2020  EXAMINATION: CT OF THE ABDOMEN AND PELVIS WITH CONTRAST 12/3/2020 3:41 pm TECHNIQUE: CT of the abdomen and pelvis was performed with the administration of intravenous contrast. Multiplanar reformatted images are provided for review. Dose modulation, iterative reconstruction, and/or weight based adjustment of the mA/kV was utilized to reduce the radiation dose to as low as reasonably achievable. COMPARISON: 06/21/2020 HISTORY: ORDERING SYSTEM PROVIDED HISTORY: LLQ pain TECHNOLOGIST PROVIDED HISTORY: If patient is on cardiac monitor and/or pulse ox, they may be taken off cardiac monitor and pulse ox, left on O2 if currently on. All monitors reattached when patient returns to room. Additional Contrast?->None Reason for exam:->LLQ pain Reason for Exam: LLQ pain, ovarian cyst? Acuity: Acute Type of Exam: Initial FINDINGS: Lower Chest: The lung bases are clear and the heart size is normal. Organs: The liver, spleen, pancreas, adrenal glands and kidneys are normal. The gallbladder is surgically absent. Bile ducts are not dilated. GI/Bowel: The appendix is normal.  Unopacified bowel loops are grossly normal.  No bowel obstruction. Pelvis: The uterus is retroverted in position. Follicular prominence of the bilateral ovaries. There is a small volume of low-attenuation free fluid in the right cul-de-sac. Peritoneum/Retroperitoneum: There is no adenopathy or free air. There is no mesenteric fat stranding. Bones/Soft Tissues: No acute bone or soft tissue abnormality. There is an unchanged small complex umbilical hernia. There is an umbilical piercing. Small volume of free fluid in the pelvis, likely physiologic. Mild follicular prominence of the bilateral ovaries. No other acute finding in the abdomen or pelvis. PROCEDURES   Unless otherwise noted below, none     Procedures    CRITICAL CARE TIME   N/A    CONSULTS:  None      EMERGENCY DEPARTMENT COURSE and DIFFERENTIAL DIAGNOSIS/MDM:   Vitals:    Vitals:    12/03/20 1404 12/03/20 1741 12/03/20 1815   BP: 111/75 102/75 (!) 100/54   Pulse: 71     Resp: 16 18 18   Temp: 98.8 °F (37.1 °C)     TempSrc: Temporal     SpO2: (!) 8% 100%    Weight: 167 lb 1.7 oz (75.8 kg)     Height: 5' 4\" (1.626 m)      *Initial O2 saturation of 8% was a mis-entry    Patient was given the following medications:  Medications   HYDROmorphone (DILAUDID) injection 0.5 mg (0.5 mg Intravenous Given 12/3/20 1545)   ondansetron (ZOFRAN) injection 4 mg (4 mg Intravenous Given 12/3/20 1545)   0.9 % sodium chloride bolus (0 mLs Intravenous Stopped 12/3/20 1829)   iopamidol (ISOVUE-370) 76 % injection 75 mL (75 mLs Intravenous Given 12/3/20 1549)   HYDROmorphone (DILAUDID) injection 0.5 mg (0.5 mg Intravenous Given 12/3/20 1653)   HYDROcodone-acetaminophen (NORCO) 5-325 MG per tablet 1 tablet (1 tablet Oral Given 12/3/20 1837)           ED COURSE & MEDICAL DECISION MAKING    - The patient presented to the ER with complaints of pelvic pain and vaginal bleeding that started this morning around 0300. Vital signs were reviewed. Exam with WDWN female who does appear mildly uncomfortable. Peripheral IV placed. Labs, Imaging ordered.    - [facility] MD  [address] [place]    Schedule an appointment as soon as possible for a visit       Christiana Kunz MD  [address] [facility], [address] [place] [address]  [phone]    Schedule an appointment as soon as possible for a visit       [facility] Emergency Department  [address]  [phone]    If symptoms worsen      DISCHARGE MEDICATIONS:  Discharge Medication List as of 12/3/2020  7:00 PM      START taking these medications    Details   acetaminophen (TYLENOL) 500 MG tablet Take 1 tablet by mouth 4 times daily as needed for Pain, Disp-120 tablet,R-0Print             DISCONTINUED MEDICATIONS:  Discharge Medication List as of 12/3/2020  7:00 PM                 (Please note that portions of this note were completed with a voice recognition program.  Efforts were made to edit the dictations but occasionally words are mis-transcribed.)    LOVELY Houston (electronically signed)            Kesha Houstonma  12/03/20 0688

## 2020-12-03 NOTE — ED NOTES
Pt to [address] [facility] [address] via wheelchair.       Linda Santoro RN  12/03/20 2042

## 2020-12-04 LAB — URINE CULTURE, ROUTINE: NORMAL

## 2020-12-04 NOTE — ED NOTES
Discharge and education instructions reviewed. Patient verbalized understanding, teach-back successful. Patient denied questions at this time. No acute distress noted. Patient instructed to follow-up as noted - return to emergency department if symptoms worsen. Patient verbalized understanding. Discharged per EDMD with discharged instructions.        Linda Santoro RN  12/03/20 4794

## 2020-12-08 LAB
C TRACH DNA GENITAL QL NAA+PROBE: NEGATIVE
N. GONORRHOEAE DNA: NEGATIVE

## 2021-01-26 LAB
HCT VFR BLD CALC: 39.8 % (ref 36–48)
HEMOGLOBIN: 13.6 G/DL (ref 12–16)
MCH RBC QN AUTO: 30.8 PG (ref 26–34)
MCHC RBC AUTO-ENTMCNC: 34.3 G/DL (ref 31–36)
MCV RBC AUTO: 89.7 FL (ref 80–100)
PDW BLD-RTO: 13.2 % (ref 12.4–15.4)
PLATELET # BLD: 251 K/UL (ref 135–450)
PMV BLD AUTO: 9.4 FL (ref 5–10.5)
RBC # BLD: 4.44 M/UL (ref 4–5.2)
TSH SERPL DL<=0.05 MIU/L-ACNC: 1.97 UIU/ML (ref 0.27–4.2)
WBC # BLD: 9.9 K/UL (ref 4–11)

## 2021-03-05 ENCOUNTER — OFFICE VISIT (OUTPATIENT)
Dept: FAMILY MEDICINE CLINIC | Age: 26
End: 2021-03-05
Payer: MEDICARE

## 2021-03-05 VITALS
WEIGHT: 162 LBS | BODY MASS INDEX: 27.66 KG/M2 | HEIGHT: 64 IN | DIASTOLIC BLOOD PRESSURE: 66 MMHG | SYSTOLIC BLOOD PRESSURE: 102 MMHG | TEMPERATURE: 96.6 F

## 2021-03-05 DIAGNOSIS — N30.01 ACUTE CYSTITIS WITH HEMATURIA: Primary | ICD-10-CM

## 2021-03-05 LAB
BILIRUBIN, POC: ABNORMAL
BLOOD URINE, POC: ABNORMAL
CLARITY, POC: CLEAR
COLOR, POC: YELLOW
GLUCOSE URINE, POC: ABNORMAL
KETONES, POC: ABNORMAL
LEUKOCYTE EST, POC: ABNORMAL
NITRITE, POC: POSITIVE
PH, POC: 7
PROTEIN, POC: ABNORMAL
SPECIFIC GRAVITY, POC: 1.02
UROBILINOGEN, POC: 0.2

## 2021-03-05 PROCEDURE — 4004F PT TOBACCO SCREEN RCVD TLK: CPT | Performed by: NURSE PRACTITIONER

## 2021-03-05 PROCEDURE — G8419 CALC BMI OUT NRM PARAM NOF/U: HCPCS | Performed by: NURSE PRACTITIONER

## 2021-03-05 PROCEDURE — G8427 DOCREV CUR MEDS BY ELIG CLIN: HCPCS | Performed by: NURSE PRACTITIONER

## 2021-03-05 PROCEDURE — 99213 OFFICE O/P EST LOW 20 MIN: CPT | Performed by: NURSE PRACTITIONER

## 2021-03-05 PROCEDURE — G8484 FLU IMMUNIZE NO ADMIN: HCPCS | Performed by: NURSE PRACTITIONER

## 2021-03-05 RX ORDER — SULFAMETHOXAZOLE AND TRIMETHOPRIM 800; 160 MG/1; MG/1
1 TABLET ORAL 2 TIMES DAILY
Qty: 14 TABLET | Refills: 0 | Status: SHIPPED | OUTPATIENT
Start: 2021-03-05 | End: 2021-03-12

## 2021-03-05 RX ORDER — CLONAZEPAM 2 MG/1
2 TABLET ORAL 3 TIMES DAILY PRN
COMMUNITY
Start: 2021-02-20

## 2021-03-05 RX ORDER — DEXTROAMPHETAMINE SACCHARATE, AMPHETAMINE ASPARTATE MONOHYDRATE, DEXTROAMPHETAMINE SULFATE AND AMPHETAMINE SULFATE 2.5; 2.5; 2.5; 2.5 MG/1; MG/1; MG/1; MG/1
10 CAPSULE, EXTENDED RELEASE ORAL 2 TIMES DAILY
COMMUNITY
Start: 2021-02-25

## 2021-03-05 RX ORDER — FLUCONAZOLE 150 MG/1
150 TABLET ORAL
Qty: 2 TABLET | Refills: 0 | Status: SHIPPED | OUTPATIENT
Start: 2021-03-05 | End: 2021-03-11

## 2021-03-05 ASSESSMENT — ENCOUNTER SYMPTOMS
BACK PAIN: 1
VOMITING: 0
ABDOMINAL PAIN: 0
NAUSEA: 0

## 2021-03-05 NOTE — PATIENT INSTRUCTIONS
Tell your doctor if you have ever had:  · kidney or liver disease;  · a folate (folic acid) deficiency;  · asthma or severe allergies;  · a thyroid disorder;  · HIV or AIDS;  · malnourishment;  · alcoholism;  · high levels of potassium in your blood;  · porphyria, or glucose-6-phosphate dehydrogenase (G6PD) deficiency; or  · if you use a blood thinner (such as warfarin) and you have routine \"INR\" or prothrombin time tests. How should I use sulfamethoxazole and trimethoprim? This medicine is taken by mouth (oral) or given as an infusion into a vein (injection). Follow all directions on your prescription label and read all medication guides or instruction sheets. Use the medicine exactly as directed. Shake the oral suspension (liquid) before you measure a dose. Use the dosing syringe provided, or use a medicine dose-measuring device (not a kitchen spoon). A healthcare provider will give the first injection and may teach you how to properly use the medication by yourself. When using injections by yourself, be sure you understand how to properly mix and store the medicine. Ask your doctor or pharmacist if you don't understand all instructions. Drink plenty of fluids to prevent kidney stones while you are using this medicine. Sulfamethoxazole and trimethoprim doses are based on weight in children. Use only the recommended dose when giving this medicine to a child. Use this medicine for the full prescribed length of time, even if your symptoms quickly improve. Skipping doses can increase your risk of infection that is resistant to medication. This medicine will not treat a viral infection such as the flu or a common cold. You may need frequent medical tests. This medicine can affect the results of certain medical tests. Tell any doctor who treats you that you are using sulfamethoxazole and trimethoprim. Store at room temperature away from moisture, heat, and light. What happens if I miss a dose? Use the medicine as soon as you can, but skip the missed dose if it is almost time for your next dose. Do not use two doses at one time. What happens if I overdose? Seek emergency medical attention or call the Poison Help line at 1-273.354.8688. Overdose symptoms may include loss of appetite, vomiting, fever, blood in your urine, yellowing of your skin or eyes, confusion, or loss of consciousness. What should I avoid while using sulfamethoxazole and trimethoprim? Antibiotic medicines can cause diarrhea, which may be a sign of a new infection. If you have diarrhea that is watery or bloody, call your doctor before using anti-diarrhea medicine. If you use the injection form of this medicine, do not eat or drink anything that contains propylene glycol (an ingredient in many processed foods, soft drinks, and medicines). Dangerous effects could occur. Sulfamethoxazole and trimethoprim could make you sunburn more easily. Avoid sunlight or tanning beds. Wear protective clothing and use sunscreen (SPF 30 or higher) when you are outdoors. What are the possible side effects of sulfamethoxazole and trimethoprim? Get emergency medical help if you have signs of an allergic reaction (hives, cough, shortness of breath, swelling in your face or throat) or a severe skin reaction (fever, sore throat, burning eyes, skin pain, red or purple skin rash with blistering and peeling).   Call your doctor at once if you have:  · severe stomach pain, diarrhea that is watery or bloody (even if it occurs months after your last dose);  · a skin rash, no matter how mild;  · yellowing of your skin or eyes;  · a seizure;  · new or unusual joint pain;  · increased or decreased urination;  · swelling, bruising, or irritation around the IV needle;  · increased thirst, dry mouth, fruity breath odor; · an electrolyte imbalance --headache, confusion, weakness, slurred speech, tingly feeling, chest pain, irregular heartbeats, loss of coordination or movement, feeling unsteady; or  · low blood cell counts --fever, chills, mouth sores, skin sores, easy bruising, unusual bleeding, pale skin, cold hands and feet, feeling light-headed or short of breath. Common side effects may include:  · nausea, vomiting, loss of appetite; or  · mild itching or rash. This is not a complete list of side effects and others may occur. Call your doctor for medical advice about side effects. You may report side effects to FDA at 3-870-XFY-1138. What other drugs will affect sulfamethoxazole and trimethoprim? You may need more frequent check- ups or medical tests if you also use medicine to treat depression, diabetes, seizures, or HIV. Many drugs can interact, and some drugs should not be used together. Tell your doctor about all your current medicines, especially:  · amantadine, cyclosporine, indomethacin, leucovorin, methotrexate, pyrimethamine;  · an \"ACE inhibitor\" heart or blood presure medication (benazepril, enalapril, lisinopril, quinapril, ramipril, and others); or  · a diuretic or \"water pill\" (chlorthalidone, hydrochlorothiazide, and others). This list is not complete and many other drugs may affect sulfamethoxazole and trimethoprim. This includes prescription and over-the-counter medicines, vitamins, and herbal products. Not all possible drug interactions are listed here. Where can I get more information? Your pharmacist can provide more information about sulfamethoxazole and trimethoprim. Remember, keep this and all other medicines out of the reach of children, never share your medicines with others, and use this medication only for the indication prescribed. Every effort has been made to ensure that the information provided by [address] is accurate, up-to-date, and complete, but no guarantee is made to that effect. Drug information contained herein may be time sensitive. [facility] information has been compiled for use by healthcare practitioners and consumers in the [place] and therefore [facility] does not warrant that uses outside of the [place] are appropriate, unless specifically indicated otherwise. [facility]'s drug information does not endorse drugs, diagnose patients or recommend therapy. [facility]'s drug information is an informational resource designed to assist licensed healthcare practitioners in caring for their patients and/or to serve consumers viewing this service as a supplement to, and not a substitute for, the expertise, skill, knowledge and judgment of healthcare practitioners. The absence of a warning for a given drug or drug combination in no way should be construed to indicate that the drug or drug combination is safe, effective or appropriate for any given patient. [facility] does not assume any responsibility for any aspect of healthcare administered with the aid of information [facility] provides. The information contained herein is not intended to cover all possible uses, directions, precautions, warnings, drug interactions, allergic reactions, or adverse effects. If you have questions about the drugs you are taking, check with your doctor, nurse or pharmacist.  Copyright 2971-1906 [address]. Version: 9.01. Revision date: 3/8/2019. Care instructions adapted under license by [facility] ([facility]). If you have questions about a medical condition or this instruction, always ask your healthcare professional. [place] [address] any warranty or liability for your use of this information.        Patient Education        fluconazole (oral/injection)  Pronunciation:  floo KOE na zole  Brand:  Diflucan What is the most important information I should know about fluconazole? Tell your doctor about all your current medicines and any you start or stop using. Many drugs can interact, and some drugs should not be used together. What is fluconazole? Fluconazole is an antifungal medicine. Fluconazole is used to treat infections caused by fungus, which can invade any part of the body including the mouth, throat, esophagus, lungs, bladder, genital area, and the blood. Fluconazole is also used to prevent fungal infection in people who have a weak immune system caused by cancer treatment, bone marrow transplant, or diseases such as AIDS. Fluconazole is also used to treat a certain type of meningitis in people with HIV or AIDS. Fluconazole may also be used for purposes not listed in this medication guide. What should I discuss with my healthcare provider before taking fluconazole? You should not use fluconazole if you are allergic to it. Some medicines can cause unwanted or dangerous effects when used with fluconazole. Your doctor may change your treatment plan if you also use:  · an antibiotic, antifungal, or antiviral medicine;  · a blood thinner;  · cancer medicine;  · cholesterol medication;  · oral diabetes medicine;  · heart or blood pressure medication;  · medicine for malaria or tuberculosis;  · medicine to prevent organ transplant rejection;  · medicine to treat depression or mental illness;  · an NSAID (nonsteroidal anti-inflammatory drug);  · seizure medicine; or  · steroid medicine. Tell your doctor if you have ever had:  · liver disease;  · HIV or AIDS;  · cancer;  · heart problems;  · long QT syndrome (in you or a family member);  · kidney disease; or  · if you are allergic to other antifungal medicine (such as ketoconazole, itraconazole, miconazole, posaconazole, voriconazole, and others). You may store the oral suspension in a refrigerator, but do not allow it to freeze. Throw away any leftover liquid that is more than 3weeks old. What happens if I miss a dose? Use the medicine as soon as you can, but skip the missed dose if it is almost time for your next dose. Do not use two doses at one time. What happens if I overdose? Seek emergency medical attention or call the Poison Help line at 1-970.252.4658. Overdose symptoms may include confusion or unusual thoughts or behavior. What should I avoid while using fluconazole? Follow your doctor's instructions about any restrictions on food, beverages, or activity. Avoid driving or hazardous activity until you know how this medicine will affect you. Your reactions could be impaired. What are the possible side effects of fluconazole? Get emergency medical help if you have signs of an allergic reaction (hives, difficult breathing, swelling in your face or throat) or a severe skin reaction (fever, sore throat, burning eyes, skin pain, red or purple skin rash with blistering and peeling). Call your doctor at once if you have:  · fast or pounding heartbeats, fluttering in your chest, shortness of breath, and sudden dizziness (like you might pass out);  · fever, chills, body aches, flu symptoms;  · easy bruising or bleeding, unusual weakness;  · seizure (convulsions);  · skin rash or skin lesions; or  · liver problems --loss of appetite, stomach pain (upper right side), dark urine, virgilio-colored stools, jaundice (yellowing of the skin or eyes). Common side effects may include:  · nausea, stomach pain, diarrhea, upset stomach;  · headache;  · dizziness; or  · changes in your sense of taste. This is not a complete list of side effects and others may occur. Call your doctor for medical advice about side effects. You may report side effects to FDA at 9-611-GZQ-7396. What other drugs will affect fluconazole? Sometimes it is not safe to use certain medications at the same time. Some drugs can affect your blood levels of other drugs you take, which may increase side effects or make the medications less effective. Fluconazole can cause a serious heart problem. Your risk may be higher if you also use certain other medicines for infections, asthma, heart problems, high blood pressure, depression, mental illness, cancer, malaria, or HIV. Many drugs can affect fluconazole, and some drugs should not be used at the same time. Tell your doctor about all your current medicines and any medicine you start or stop using. This includes prescription and over-the-counter medicines, vitamins, and herbal products. Not all possible interactions are listed here. Where can I get more information? Your pharmacist can provide more information about fluconazole. Remember, keep this and all other medicines out of the reach of children, never share your medicines with others, and use this medication only for the indication prescribed. Every effort has been made to ensure that the information provided by [address] is accurate, up-to-date, and complete, but no guarantee is made to that effect. Drug information contained herein may be time sensitive. [facility] information has been compiled for use by healthcare practitioners and consumers in the [place] and therefore [facility] does not warrant that uses outside of the [place] are appropriate, unless specifically indicated otherwise. [facility]'s drug information does not endorse drugs, diagnose patients or recommend therapy. [facility]'s drug information is an informational resource designed to assist licensed healthcare practitioners in caring for their patients and/or to serve consumers viewing this service as a supplement to, and not a substitute for, the expertise, skill, knowledge and judgment of healthcare practitioners. The absence of a warning for a given drug or drug combination in no way should be construed to indicate that the drug or drug combination is safe, effective or appropriate for any given patient. [facility] does not assume any responsibility for any aspect of healthcare administered with the aid of information [facility] provides. The information contained herein is not intended to cover all possible uses, directions, precautions, warnings, drug interactions, allergic reactions, or adverse effects. If you have questions about the drugs you are taking, check with your doctor, nurse or pharmacist.  Copyright 0863-6312 [address]. Version: 11.01. Revision date: 10/2/2019. Care instructions adapted under license by [facility] ([facility]). If you have questions about a medical condition or this instruction, always ask your healthcare professional. [place] [address] any warranty or liability for your use of this information.

## 2021-03-05 NOTE — PROGRESS NOTES
Amelia Myers (:  1995) is a 22 y.o. female,Established patient, here for evaluation of the following chief complaint(s):  Urinary Tract Infection (POSS UTI MAYBE YEAST INFECTION SHE HAD TUBES TIDE A YEAR AGO, MIRENA PUT IN A MONTH AGO )      ASSESSMENT/PLAN:  1. Acute cystitis with hematuria  -UA is indicative of UTI. Bactrim is being sent to the pharmacy. Fluconazole is also being sent to the pharmacy given the presence of itching. Patient has been educated to complete the antibiotic and then take the Diflucan. The patient has been educated on medication uses as well as side effects. -Given the presence of a large leukocytes, large blood, nitrates on the UA as well as flank pain, I have educated the patient on signs and symptoms of pyelonephritis. I have a low index of suspicion for this at this time. She does not have a fever. She is not experiencing nausea or vomiting. She has been educated that if flank pain were to worsen or urine output were to decrease, she is to go to the emergency room. -She has an appointment with her gynecologist on 3/8 for scraping and follow-up of Mirena placement. I have educated her to update the gynecologist with this condition and discuss it with them if symptoms or not improved. -     POCT Urinalysis no Micro  -     Culture, Urine  -     sulfamethoxazole-trimethoprim (BACTRIM DS;SEPTRA DS) 800-160 MG per tablet; Take 1 tablet by mouth 2 times daily for 7 days, Disp-14 tablet, R-0Normal  -     fluconazole (DIFLUCAN) 150 MG tablet; Take 1 tablet by mouth every 72 hours for 6 days, Disp-2 tablet, R-0Normal      Return if symptoms worsen or fail to improve.     SUBJECTIVE/OBJECTIVE:  HPI Lcuio Isbell presents today with possible UTI. She states that she noted symptoms 2 nights ago. She states that she is experiencing pain in her low lower back on the left side as well as frequency, pain with urination, and some straining. She also states that she is experiencing some itching. She states that her symptoms are difficult to assess because of her gynecologic history. She had her tubes tied a year ago and states that it was found in December that she had some clots that were still present from the surgery. She has been following up with her OB/GYN for management of this. She also had a Mirena put in a month ago. She is to have a exam with full scraping done on 3/8. She states that because of this, she has a constant drainage from her vagina. She has not noted any change in the character of this. Denies any fevers, chills, or decrease in urination. Review of Systems   Constitutional: Negative for chills, fatigue and fever. Gastrointestinal: Negative for abdominal pain, nausea and vomiting. Genitourinary: Positive for difficulty urinating, dysuria, flank pain, frequency, pelvic pain, urgency and vaginal discharge. Negative for decreased urine volume. Musculoskeletal: Positive for back pain. Neurological: Negative for dizziness, weakness, light-headedness, numbness and headaches. Physical Exam  Constitutional:       General: She is not in acute distress. Appearance: Normal appearance. She is normal weight. Cardiovascular:      Pulses: Normal pulses. Heart sounds: Normal heart sounds. No murmur. No gallop. Pulmonary:      Effort: Pulmonary effort is normal.      Breath sounds: Normal breath sounds. No wheezing. Abdominal:      Tenderness: There is right CVA tenderness and left CVA tenderness. Neurological:      Mental Status: She is alert and oriented to person, place, and time.    Psychiatric:         Mood and Affect: Mood normal. Behavior: Behavior normal.         Thought Content: Thought content normal.         Judgment: Judgment normal.     Urine dipstick shows positive for nitrites, leukocytes, red blood cells. On this date 3/5/2021 I have spent 20 minutes reviewing previous notes, test results and face to face with the patient discussing the diagnosis and importance of compliance with the treatment plan as well as documenting on the day of the visit. An electronic signature was used to authenticate this note.     --LYNNETTE Slater - CNP

## 2021-03-07 LAB
ORGANISM: ABNORMAL
URINE CULTURE, ROUTINE: ABNORMAL

## 2021-03-09 LAB — URINE CULTURE, ROUTINE: NORMAL

## 2021-05-03 ENCOUNTER — OFFICE VISIT (OUTPATIENT)
Dept: FAMILY MEDICINE CLINIC | Age: 26
End: 2021-05-03
Payer: MEDICARE

## 2021-05-03 VITALS
DIASTOLIC BLOOD PRESSURE: 82 MMHG | TEMPERATURE: 98.2 F | WEIGHT: 160 LBS | HEART RATE: 74 BPM | HEIGHT: 64 IN | BODY MASS INDEX: 27.31 KG/M2 | SYSTOLIC BLOOD PRESSURE: 122 MMHG | OXYGEN SATURATION: 98 %

## 2021-05-03 DIAGNOSIS — H00.014 HORDEOLUM EXTERNUM OF LEFT UPPER EYELID: Primary | ICD-10-CM

## 2021-05-03 PROCEDURE — G8427 DOCREV CUR MEDS BY ELIG CLIN: HCPCS | Performed by: NURSE PRACTITIONER

## 2021-05-03 PROCEDURE — 4004F PT TOBACCO SCREEN RCVD TLK: CPT | Performed by: NURSE PRACTITIONER

## 2021-05-03 PROCEDURE — G8419 CALC BMI OUT NRM PARAM NOF/U: HCPCS | Performed by: NURSE PRACTITIONER

## 2021-05-03 PROCEDURE — 99213 OFFICE O/P EST LOW 20 MIN: CPT | Performed by: NURSE PRACTITIONER

## 2021-05-03 SDOH — ECONOMIC STABILITY: FOOD INSECURITY: WITHIN THE PAST 12 MONTHS, THE FOOD YOU BOUGHT JUST DIDN'T LAST AND YOU DIDN'T HAVE MONEY TO GET MORE.: NEVER TRUE

## 2021-05-03 SDOH — ECONOMIC STABILITY: TRANSPORTATION INSECURITY
IN THE PAST 12 MONTHS, HAS THE LACK OF TRANSPORTATION KEPT YOU FROM MEDICAL APPOINTMENTS OR FROM GETTING MEDICATIONS?: NO

## 2021-05-03 SDOH — ECONOMIC STABILITY: FOOD INSECURITY: WITHIN THE PAST 12 MONTHS, YOU WORRIED THAT YOUR FOOD WOULD RUN OUT BEFORE YOU GOT MONEY TO BUY MORE.: NEVER TRUE

## 2021-05-03 SDOH — ECONOMIC STABILITY: INCOME INSECURITY: HOW HARD IS IT FOR YOU TO PAY FOR THE VERY BASICS LIKE FOOD, HOUSING, MEDICAL CARE, AND HEATING?: NOT HARD AT ALL

## 2021-05-03 SDOH — ECONOMIC STABILITY: TRANSPORTATION INSECURITY
IN THE PAST 12 MONTHS, HAS LACK OF TRANSPORTATION KEPT YOU FROM MEETINGS, WORK, OR FROM GETTING THINGS NEEDED FOR DAILY LIVING?: NO

## 2021-05-03 ASSESSMENT — ENCOUNTER SYMPTOMS
PHOTOPHOBIA: 0
RHINORRHEA: 0
EYE DISCHARGE: 0
EYE REDNESS: 0
EYE PAIN: 1
EYE ITCHING: 0
SINUS PRESSURE: 0

## 2021-05-03 NOTE — PROGRESS NOTES
Simeon Winter (:  1995) is a 22 y.o. female,Established patient, here for evaluation of the following chief complaint(s):  Stye (NOTICED A STYE LAST NIGHT. STARTED HURTING IN THE EVENING AND THEN WOKE UP THIS AM WITH SWELLING IN LEFT EYE. STILL PAINFUL. )      ASSESSMENT/PLAN:  1. Hordeolum externum of left upper eyelid  -Presentation is consistent with hordeolum. Educated the patient that these are typically self-limiting. Educated to continue with warm compresses. Over-the-counter eyedrops as needed for eye irritation. Ibuprofen as needed for pain. Follow-up as needed. Return if symptoms worsen or fail to improve. SUBJECTIVE/OBJECTIVE:  PRANAV Mcdaniel presents today with possible stye to her left eye. She states that she noticed it last night. She woke up about four morning with significant swelling to her left eye. She applied a warm compress, and by morning it had decreased back in size. She states that it continues to be painful. Her eyelid is a little swollen. Denies any visual changes. 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 Denies any fevers or chills. Review of Systems   Constitutional: Negative for chills and fever. HENT: Negative for congestion, ear discharge, ear pain, hearing loss, postnasal drip, rhinorrhea, sinus pressure and sneezing. Eyes: Positive for pain. Negative for photophobia, discharge, redness, itching and visual disturbance. Stye to left upper eyelid   Neurological: Negative for dizziness, weakness, light-headedness, numbness and headaches. Physical Exam  Constitutional:       General: She is not in acute distress. Appearance: Normal appearance. She is normal weight. Eyes:      General: No scleral icterus. Right eye: No discharge. Left eye: Hordeolum present. No discharge. Extraocular Movements: Extraocular movements intact.       Conjunctiva/sclera: Conjunctivae normal.      Pupils: Pupils are equal, round, and reactive to light. Comments: Hordeolum the left upper eyelid   Neurological:      Mental Status: She is alert and oriented to person, place, and time. Psychiatric:         Mood and Affect: Mood normal.         Behavior: Behavior normal.         Thought Content: Thought content normal.         Judgment: Judgment normal.           On this date 5/3/2021 I have spent 20 minutes reviewing previous notes, test results and face to face with the patient discussing the diagnosis and importance of compliance with the treatment plan as well as documenting on the day of the visit. An electronic signature was used to authenticate this note.     --Miriam Hagen, LYNNETTE - CNP

## 2021-05-03 NOTE — PATIENT INSTRUCTIONS
Patient Education        Styes and Chalazia: Care Instructions  Your Care Instructions     Styes and chalazia (say \"lig-YZU-jld-uh\") are both conditions that can cause swelling of the eyelid. A stye is an infection in the root of an eyelash. The infection causes a tender red lump on the edge of the eyelid. The infection can spread until the whole eyelid becomes red and inflamed. Styes usually break open, and a tiny amount of pus drains. They usually clear up on their own in about a week, but they sometimes need treatment with antibiotics. A chalazion is a lump or cyst in the eyelid (chalazion is singular; chalazia is plural). It is caused by swelling and inflammation of deep oil glands inside the eyelid. Chalazia are usually not infected. They can take a few months to heal.  If a chalazion becomes more swollen and painful or does not go away, you may need to have it drained by your doctor. Follow-up care is a key part of your treatment and safety. Be sure to make and go to all appointments, and call your doctor if you are having problems. It's also a good idea to know your test results and keep a list of the medicines you take. How can you care for yourself at home? · Do not rub your eyes. Do not squeeze or try to open a stye or chalazion. · To help a stye or chalazion heal faster:  ? Put a warm, moist compress on your eye for 5 to 10 minutes, 3 to 6 times a day. Heat often brings a stye to a point where it drains on its own. Keep in mind that warm compresses will often increase swelling a little at first.  ? Do not use hot water or heat a wet cloth in a microwave oven. The compress may get too hot and can burn the eyelid. · Always wash your hands before and after you use a compress or touch your eyes. · If the doctor gave you antibiotic drops or ointment, use the medicine exactly as directed. Use the medicine for as long as instructed, even if your eye starts to feel better.   · To put in eyedrops or ointment:  ? Tilt your head back, and pull your lower eyelid down with one finger. ? Drop or squirt the medicine inside the lower lid. ? Close your eye for 30 to 60 seconds to let the drops or ointment move around. ? Do not touch the ointment or dropper tip to your eyelashes or any other surface. · Do not wear eye makeup or contact lenses until the stye or chalazion heals. · Do not share towels, pillows, or washcloths while you have a stye. When should you call for help? Call your doctor now or seek immediate medical care if:    · You have pain in your eye.     · You have a change in vision or loss of vision.     · Redness and swelling get much worse. Watch closely for changes in your health, and be sure to contact your doctor if:    · Your stye does not get better in 1 week.     · Your chalazion does not start to get better after several weeks. Where can you learn more? Go to [email or web address]://[email or web address]. org and sign in to your [employer] account. Enter S312 in the [facility] box to learn more about \"Styes and Chalazia: Care Instructions. \"     If you do not have an account, please click on the \"Sign Up Now\" link. Current as of: August 31, 2020               Content Version: 12.8  © 9397-6135 Healthwise, Incorporated. Care instructions adapted under license by [facility] [place] ([facility]). If you have questions about a medical condition or this instruction, always ask your healthcare professional. [place] [address] any warranty or liability for your use of this information.

## 2021-05-25 NOTE — PROGRESS NOTES
Name_______________________________________Printed:____________________  Date and time of surgery__6/2/21 @ 0830______________________Arrival Time:__0700 main hosp______________   1. The instructions given regarding when and if a patient needs to stop oral intake prior to surgery varies. Follow the specific instructions you were given                  _x_Nothing to eat or to drink after Midnight the night before.                   ____Carbo loading or ERAS instructions will be given to select patients-if you have been given those instructions -please do the following                           The evening before your surgery after dinner before midnight drink 40 ounces of gatorade. If you are diabetic use sugar free. The morning of surgery drink 40 ounces of water. This needs to be finished 3 hours prior to your surgery start time. 2. Take the following pills with a small sip of water on the morning of surgery____lithium, klonopin,propranolol _______________________________________________                  Do not take blood pressure medications ending in pril or sartan the david prior to surgery or the morning of surgery_   3. Aspirin, Ibuprofen, Advil, Naproxen, Vitamin E and other Anti-inflammatory products and supplements should be stopped for 5 -7days before surgery or as directed by your physician. 4. Check with your Doctor regarding stopping Plavix, Coumadin,Eliquis, Lovenox,Effient,Pradaxa,Xarelto, Fragmin or other blood thinners and follow their instructions. 5. Do not smoke, and do not drink any alcoholic beverages 24 hours prior to surgery. This includes NA Beer. Refrain from the usage of any recreational drugs. 6. You may brush your teeth and gargle the morning of surgery. DO NOT SWALLOW WATER   7. You MUST make arrangements for a responsible adult to stay on site while you are here and take you home after your surgery. You will not be allowed to leave alone or drive yourself home.   It is strongly suggested someone stay with you the first 24 hrs. Your surgery will be cancelled if you do not have a ride home. 8. A parent/legal guardian must accompany a child scheduled for surgery and plan to stay at the hospital until the child is discharged. Please do not bring other children with you. 9. Please wear simple, loose fitting clothing to the hospital.  Eduarda Cheese not bring valuables (money, credit cards, checkbooks, etc.) Do not wear any makeup (including no eye makeup) or nail polish on your fingers or toes. 10. DO NOT wear any jewelry or piercings on day of surgery. All body piercing jewelry must be removed. 11. If you have ___dentures, they will be removed before going to the OR; we will provide you a container. If you wear ___contact lenses or ___glasses, they will be removed; please bring a case for them. 12. Please see your family doctor/pediatrician for a history & physical and/or concerning medications. Bring any test results/reports from your physician's office. PCP___foley_____________Phone___________H&P Appt. Date________             13 If you  have a Living Will and Durable Power of  for Healthcare, please bring in a copy. 15. Notify your Surgeon if you develop any illness between now and surgery  time, cough, cold, fever, sore throat, nausea, vomiting, etc.  Please notify your surgeon if you experience dizziness, shortness of breath or blurred vision between now & the time of your surgery             15. DO NOT shave your operative site 96 hours prior to surgery. For face & neck surgery, men may use an electric razor 48 hours prior to surgery. 16. Shower the night before or morning of surgery using an antibacterial soap or as you have been instructed. 17. To provide excellent care visitors will be limited to one in the room at any given time. 18.  Please bring picture ID and insurance card.              19. allowed. Please note that the visitor policy is subject to change. All above information reviewed with patient in person or by phone. Patient verbalizes understanding. All questions and concerns addressed.                                                                                                  Patient/Rep____pt________________                                                                                                                                    PRE OP INSTRUCTIONS

## 2021-05-26 ENCOUNTER — HOSPITAL ENCOUNTER (OUTPATIENT)
Age: 26
Discharge: HOME | End: 2021-05-26
Payer: MEDICARE

## 2021-05-26 ENCOUNTER — OFFICE VISIT (OUTPATIENT)
Dept: PRIMARY CARE CLINIC | Age: 26
End: 2021-05-26
Payer: MEDICARE

## 2021-05-26 DIAGNOSIS — Z20.828 EXPOSURE TO SARS-ASSOCIATED CORONAVIRUS: Primary | ICD-10-CM

## 2021-05-26 DIAGNOSIS — Z01.818 PREOP TESTING: ICD-10-CM

## 2021-05-26 LAB
ABO/RH: NORMAL
ANTIBODY SCREEN: NORMAL
HCT VFR BLD CALC: 40.7 % (ref 36–48)
HEMOGLOBIN: 13.9 G/DL (ref 12–16)
MCH RBC QN AUTO: 30.8 PG (ref 26–34)
MCHC RBC AUTO-ENTMCNC: 34.2 G/DL (ref 31–36)
MCV RBC AUTO: 89.9 FL (ref 80–100)
PDW BLD-RTO: 13.7 % (ref 12.4–15.4)
PLATELET # BLD: 217 K/UL (ref 135–450)
PMV BLD AUTO: 9.7 FL (ref 5–10.5)
RBC # BLD: 4.53 M/UL (ref 4–5.2)
SARS-COV-2: NOT DETECTED
WBC # BLD: 8.6 K/UL (ref 4–11)

## 2021-05-26 PROCEDURE — G8428 CUR MEDS NOT DOCUMENT: HCPCS | Performed by: NURSE PRACTITIONER

## 2021-05-26 PROCEDURE — G8419 CALC BMI OUT NRM PARAM NOF/U: HCPCS | Performed by: NURSE PRACTITIONER

## 2021-05-26 PROCEDURE — 36415 COLL VENOUS BLD VENIPUNCTURE: CPT

## 2021-05-26 PROCEDURE — 99211 OFF/OP EST MAY X REQ PHY/QHP: CPT | Performed by: NURSE PRACTITIONER

## 2021-05-26 PROCEDURE — 86901 BLOOD TYPING SEROLOGIC RH(D): CPT

## 2021-05-26 PROCEDURE — 86850 RBC ANTIBODY SCREEN: CPT

## 2021-05-26 PROCEDURE — 86900 BLOOD TYPING SEROLOGIC ABO: CPT

## 2021-05-26 PROCEDURE — 85027 COMPLETE CBC AUTOMATED: CPT

## 2021-05-26 NOTE — PATIENT INSTRUCTIONS

## 2021-05-26 NOTE — PROGRESS NOTES
Mell Vicente received a viral test for COVID-19. They were educated on isolation and quarantine as appropriate. For any symptoms, they were directed to seek care from their PCP, given contact information to establish with a doctor, directed to an urgent care or the emergency room.

## 2021-05-28 ENCOUNTER — TELEPHONE (OUTPATIENT)
Dept: FAMILY MEDICINE CLINIC | Age: 26
End: 2021-05-28

## 2021-05-28 NOTE — TELEPHONE ENCOUNTER
Patient is calling because she has a sty in her left eye and it is swollen, but she is having surgery next Wednesday and they told her she cannot be on any medication. What can we do for her? Please give her a call back.

## 2021-05-28 NOTE — TELEPHONE ENCOUNTER
Are they referring to her current prescriptions? None of those should be contraindicated for her procedure. If she is inquiring about something for pain following the procedure, Tylenol ibuprofen or Aleve should be sufficient for this.

## 2021-05-28 NOTE — TELEPHONE ENCOUNTER
Sorry, misread. There are over-the-counter remedies for stye. An antibiotic typically is not prescribed. Continue with warm compresses as well.

## 2021-06-01 ENCOUNTER — ANESTHESIA EVENT (OUTPATIENT)
Dept: OPERATING ROOM | Age: 26
End: 2021-06-01
Payer: MEDICARE

## 2021-06-02 ENCOUNTER — HOSPITAL ENCOUNTER (OUTPATIENT)
Age: 26
Setting detail: OUTPATIENT SURGERY
Discharge: HOME | End: 2021-06-02
Attending: OBSTETRICS & GYNECOLOGY | Admitting: OBSTETRICS & GYNECOLOGY
Payer: MEDICARE

## 2021-06-02 ENCOUNTER — ANESTHESIA (OUTPATIENT)
Dept: OPERATING ROOM | Age: 26
End: 2021-06-02
Payer: MEDICARE

## 2021-06-02 VITALS
SYSTOLIC BLOOD PRESSURE: 132 MMHG | RESPIRATION RATE: 13 BRPM | OXYGEN SATURATION: 93 % | HEART RATE: 72 BPM | WEIGHT: 166.3 LBS | HEIGHT: 65 IN | BODY MASS INDEX: 27.71 KG/M2 | DIASTOLIC BLOOD PRESSURE: 78 MMHG | TEMPERATURE: 97.2 F

## 2021-06-02 VITALS
RESPIRATION RATE: 2 BRPM | TEMPERATURE: 97 F | OXYGEN SATURATION: 96 % | SYSTOLIC BLOOD PRESSURE: 111 MMHG | DIASTOLIC BLOOD PRESSURE: 66 MMHG

## 2021-06-02 DIAGNOSIS — N80.9 ENDOMETRIOSIS: ICD-10-CM

## 2021-06-02 DIAGNOSIS — Z01.818 PREOP TESTING: Primary | ICD-10-CM

## 2021-06-02 LAB
ABO/RH: NORMAL
ANTIBODY SCREEN: NORMAL
HCG QUALITATIVE: NEGATIVE

## 2021-06-02 PROCEDURE — 3700000001 HC ADD 15 MINUTES (ANESTHESIA): Performed by: OBSTETRICS & GYNECOLOGY

## 2021-06-02 PROCEDURE — 7100000010 HC PHASE II RECOVERY - FIRST 15 MIN: Performed by: OBSTETRICS & GYNECOLOGY

## 2021-06-02 PROCEDURE — 86850 RBC ANTIBODY SCREEN: CPT

## 2021-06-02 PROCEDURE — S2900 ROBOTIC SURGICAL SYSTEM: HCPCS | Performed by: OBSTETRICS & GYNECOLOGY

## 2021-06-02 PROCEDURE — 2709999900 HC NON-CHARGEABLE SUPPLY: Performed by: OBSTETRICS & GYNECOLOGY

## 2021-06-02 PROCEDURE — 6370000000 HC RX 637 (ALT 250 FOR IP): Performed by: ANESTHESIOLOGY

## 2021-06-02 PROCEDURE — 7100000011 HC PHASE II RECOVERY - ADDTL 15 MIN: Performed by: OBSTETRICS & GYNECOLOGY

## 2021-06-02 PROCEDURE — 2580000003 HC RX 258: Performed by: OBSTETRICS & GYNECOLOGY

## 2021-06-02 PROCEDURE — 2720000010 HC SURG SUPPLY STERILE: Performed by: OBSTETRICS & GYNECOLOGY

## 2021-06-02 PROCEDURE — 84703 CHORIONIC GONADOTROPIN ASSAY: CPT

## 2021-06-02 PROCEDURE — 6370000000 HC RX 637 (ALT 250 FOR IP): Performed by: NURSE ANESTHETIST, CERTIFIED REGISTERED

## 2021-06-02 PROCEDURE — 3600000019 HC SURGERY ROBOT ADDTL 15MIN: Performed by: OBSTETRICS & GYNECOLOGY

## 2021-06-02 PROCEDURE — 6360000002 HC RX W HCPCS: Performed by: ANESTHESIOLOGY

## 2021-06-02 PROCEDURE — 3700000000 HC ANESTHESIA ATTENDED CARE: Performed by: OBSTETRICS & GYNECOLOGY

## 2021-06-02 PROCEDURE — 2500000003 HC RX 250 WO HCPCS: Performed by: NURSE ANESTHETIST, CERTIFIED REGISTERED

## 2021-06-02 PROCEDURE — 6360000002 HC RX W HCPCS: Performed by: NURSE ANESTHETIST, CERTIFIED REGISTERED

## 2021-06-02 PROCEDURE — 2580000003 HC RX 258: Performed by: ANESTHESIOLOGY

## 2021-06-02 PROCEDURE — 3600000009 HC SURGERY ROBOT BASE: Performed by: OBSTETRICS & GYNECOLOGY

## 2021-06-02 PROCEDURE — 7100000000 HC PACU RECOVERY - FIRST 15 MIN: Performed by: OBSTETRICS & GYNECOLOGY

## 2021-06-02 PROCEDURE — 36415 COLL VENOUS BLD VENIPUNCTURE: CPT

## 2021-06-02 PROCEDURE — 2580000003 HC RX 258: Performed by: NURSE ANESTHETIST, CERTIFIED REGISTERED

## 2021-06-02 PROCEDURE — 86901 BLOOD TYPING SEROLOGIC RH(D): CPT

## 2021-06-02 PROCEDURE — 2500000003 HC RX 250 WO HCPCS: Performed by: OBSTETRICS & GYNECOLOGY

## 2021-06-02 PROCEDURE — 7100000001 HC PACU RECOVERY - ADDTL 15 MIN: Performed by: OBSTETRICS & GYNECOLOGY

## 2021-06-02 PROCEDURE — 86900 BLOOD TYPING SEROLOGIC ABO: CPT

## 2021-06-02 RX ORDER — SODIUM CHLORIDE, SODIUM LACTATE, POTASSIUM CHLORIDE, CALCIUM CHLORIDE 600; 310; 30; 20 MG/100ML; MG/100ML; MG/100ML; MG/100ML
INJECTION, SOLUTION INTRAVENOUS CONTINUOUS PRN
Status: DISCONTINUED | OUTPATIENT
Start: 2021-06-02 | End: 2021-06-02 | Stop reason: SDUPTHER

## 2021-06-02 RX ORDER — ONDANSETRON 2 MG/ML
4 INJECTION INTRAMUSCULAR; INTRAVENOUS
Status: COMPLETED | OUTPATIENT
Start: 2021-06-02 | End: 2021-06-02

## 2021-06-02 RX ORDER — BUPIVACAINE HYDROCHLORIDE 5 MG/ML
INJECTION, SOLUTION EPIDURAL; INTRACAUDAL
Status: COMPLETED | OUTPATIENT
Start: 2021-06-02 | End: 2021-06-02

## 2021-06-02 RX ORDER — LABETALOL HYDROCHLORIDE 5 MG/ML
5 INJECTION, SOLUTION INTRAVENOUS EVERY 10 MIN PRN
Status: DISCONTINUED | OUTPATIENT
Start: 2021-06-02 | End: 2021-06-02 | Stop reason: HOSPADM

## 2021-06-02 RX ORDER — MAGNESIUM SULFATE HEPTAHYDRATE 500 MG/ML
INJECTION, SOLUTION INTRAMUSCULAR; INTRAVENOUS PRN
Status: DISCONTINUED | OUTPATIENT
Start: 2021-06-02 | End: 2021-06-02 | Stop reason: SDUPTHER

## 2021-06-02 RX ORDER — DEXAMETHASONE SODIUM PHOSPHATE 4 MG/ML
INJECTION, SOLUTION INTRA-ARTICULAR; INTRALESIONAL; INTRAMUSCULAR; INTRAVENOUS; SOFT TISSUE PRN
Status: DISCONTINUED | OUTPATIENT
Start: 2021-06-02 | End: 2021-06-02 | Stop reason: SDUPTHER

## 2021-06-02 RX ORDER — DOCUSATE SODIUM 100 MG/1
100 CAPSULE, LIQUID FILLED ORAL DAILY PRN
Qty: 60 CAPSULE | Refills: 1 | Status: SHIPPED | OUTPATIENT
Start: 2021-06-02

## 2021-06-02 RX ORDER — LIDOCAINE HYDROCHLORIDE 20 MG/ML
INJECTION, SOLUTION EPIDURAL; INFILTRATION; INTRACAUDAL; PERINEURAL PRN
Status: DISCONTINUED | OUTPATIENT
Start: 2021-06-02 | End: 2021-06-02 | Stop reason: SDUPTHER

## 2021-06-02 RX ORDER — SODIUM CHLORIDE, SODIUM LACTATE, POTASSIUM CHLORIDE, CALCIUM CHLORIDE 600; 310; 30; 20 MG/100ML; MG/100ML; MG/100ML; MG/100ML
INJECTION, SOLUTION INTRAVENOUS CONTINUOUS
Status: DISCONTINUED | OUTPATIENT
Start: 2021-06-02 | End: 2021-06-02 | Stop reason: HOSPADM

## 2021-06-02 RX ORDER — ONDANSETRON 2 MG/ML
INJECTION INTRAMUSCULAR; INTRAVENOUS PRN
Status: DISCONTINUED | OUTPATIENT
Start: 2021-06-02 | End: 2021-06-02 | Stop reason: SDUPTHER

## 2021-06-02 RX ORDER — GLYCOPYRROLATE 1 MG/5 ML
SYRINGE (ML) INTRAVENOUS PRN
Status: DISCONTINUED | OUTPATIENT
Start: 2021-06-02 | End: 2021-06-02 | Stop reason: SDUPTHER

## 2021-06-02 RX ORDER — ROCURONIUM BROMIDE 10 MG/ML
INJECTION, SOLUTION INTRAVENOUS PRN
Status: DISCONTINUED | OUTPATIENT
Start: 2021-06-02 | End: 2021-06-02 | Stop reason: SDUPTHER

## 2021-06-02 RX ORDER — MIDAZOLAM HYDROCHLORIDE 2 MG/2ML
2 INJECTION, SOLUTION INTRAMUSCULAR; INTRAVENOUS
Status: DISCONTINUED | OUTPATIENT
Start: 2021-06-02 | End: 2021-06-02 | Stop reason: HOSPADM

## 2021-06-02 RX ORDER — SUCCINYLCHOLINE/SOD CL,ISO/PF 200MG/10ML
SYRINGE (ML) INTRAVENOUS PRN
Status: DISCONTINUED | OUTPATIENT
Start: 2021-06-02 | End: 2021-06-02 | Stop reason: SDUPTHER

## 2021-06-02 RX ORDER — ACETAMINOPHEN 500 MG
500 TABLET ORAL 4 TIMES DAILY PRN
Qty: 120 TABLET | Refills: 0 | Status: SHIPPED | OUTPATIENT
Start: 2021-06-02 | End: 2022-06-15

## 2021-06-02 RX ORDER — MIDAZOLAM HYDROCHLORIDE 1 MG/ML
INJECTION INTRAMUSCULAR; INTRAVENOUS PRN
Status: DISCONTINUED | OUTPATIENT
Start: 2021-06-02 | End: 2021-06-02 | Stop reason: SDUPTHER

## 2021-06-02 RX ORDER — HYDROCODONE BITARTRATE AND ACETAMINOPHEN 5; 325 MG/1; MG/1
2 TABLET ORAL PRN
Status: DISCONTINUED | OUTPATIENT
Start: 2021-06-02 | End: 2021-06-02 | Stop reason: HOSPADM

## 2021-06-02 RX ORDER — MAGNESIUM HYDROXIDE 1200 MG/15ML
LIQUID ORAL CONTINUOUS PRN
Status: COMPLETED | OUTPATIENT
Start: 2021-06-02 | End: 2021-06-02

## 2021-06-02 RX ORDER — DEXMEDETOMIDINE HYDROCHLORIDE 100 UG/ML
INJECTION, SOLUTION INTRAVENOUS PRN
Status: DISCONTINUED | OUTPATIENT
Start: 2021-06-02 | End: 2021-06-02 | Stop reason: SDUPTHER

## 2021-06-02 RX ORDER — LIDOCAINE HYDROCHLORIDE 40 MG/ML
SOLUTION TOPICAL PRN
Status: DISCONTINUED | OUTPATIENT
Start: 2021-06-02 | End: 2021-06-02 | Stop reason: SDUPTHER

## 2021-06-02 RX ORDER — SODIUM CHLORIDE 0.9 % (FLUSH) 0.9 %
5-40 SYRINGE (ML) INJECTION PRN
Status: DISCONTINUED | OUTPATIENT
Start: 2021-06-02 | End: 2021-06-02 | Stop reason: HOSPADM

## 2021-06-02 RX ORDER — OXYCODONE HYDROCHLORIDE 5 MG/1
5 CAPSULE ORAL EVERY 4 HOURS PRN
Qty: 10 CAPSULE | Refills: 0 | Status: SHIPPED | OUTPATIENT
Start: 2021-06-02 | End: 2021-06-09

## 2021-06-02 RX ORDER — HYDROMORPHONE HCL 110MG/55ML
PATIENT CONTROLLED ANALGESIA SYRINGE INTRAVENOUS PRN
Status: DISCONTINUED | OUTPATIENT
Start: 2021-06-02 | End: 2021-06-02 | Stop reason: SDUPTHER

## 2021-06-02 RX ORDER — LIDOCAINE HYDROCHLORIDE 10 MG/ML
0.5 INJECTION, SOLUTION EPIDURAL; INFILTRATION; INTRACAUDAL; PERINEURAL ONCE
Status: DISCONTINUED | OUTPATIENT
Start: 2021-06-02 | End: 2021-06-02 | Stop reason: HOSPADM

## 2021-06-02 RX ORDER — HYDROCODONE BITARTRATE AND ACETAMINOPHEN 5; 325 MG/1; MG/1
1 TABLET ORAL PRN
Status: DISCONTINUED | OUTPATIENT
Start: 2021-06-02 | End: 2021-06-02 | Stop reason: HOSPADM

## 2021-06-02 RX ORDER — HYDROMORPHONE HCL 110MG/55ML
0.5 PATIENT CONTROLLED ANALGESIA SYRINGE INTRAVENOUS EVERY 5 MIN PRN
Status: DISCONTINUED | OUTPATIENT
Start: 2021-06-02 | End: 2021-06-02 | Stop reason: HOSPADM

## 2021-06-02 RX ORDER — FENTANYL CITRATE 50 UG/ML
INJECTION, SOLUTION INTRAMUSCULAR; INTRAVENOUS PRN
Status: DISCONTINUED | OUTPATIENT
Start: 2021-06-02 | End: 2021-06-02 | Stop reason: SDUPTHER

## 2021-06-02 RX ORDER — PROPOFOL 10 MG/ML
INJECTION, EMULSION INTRAVENOUS PRN
Status: DISCONTINUED | OUTPATIENT
Start: 2021-06-02 | End: 2021-06-02 | Stop reason: SDUPTHER

## 2021-06-02 RX ORDER — SODIUM CHLORIDE 0.9 % (FLUSH) 0.9 %
5-40 SYRINGE (ML) INJECTION EVERY 12 HOURS SCHEDULED
Status: DISCONTINUED | OUTPATIENT
Start: 2021-06-02 | End: 2021-06-02 | Stop reason: HOSPADM

## 2021-06-02 RX ORDER — HYDROMORPHONE HCL 110MG/55ML
0.25 PATIENT CONTROLLED ANALGESIA SYRINGE INTRAVENOUS EVERY 5 MIN PRN
Status: DISCONTINUED | OUTPATIENT
Start: 2021-06-02 | End: 2021-06-02 | Stop reason: HOSPADM

## 2021-06-02 RX ORDER — SODIUM CHLORIDE 9 MG/ML
25 INJECTION, SOLUTION INTRAVENOUS PRN
Status: DISCONTINUED | OUTPATIENT
Start: 2021-06-02 | End: 2021-06-02 | Stop reason: HOSPADM

## 2021-06-02 RX ORDER — OXYCODONE HYDROCHLORIDE AND ACETAMINOPHEN 5; 325 MG/1; MG/1
1 TABLET ORAL ONCE
Status: COMPLETED | OUTPATIENT
Start: 2021-06-02 | End: 2021-06-02

## 2021-06-02 RX ADMIN — OXYCODONE HYDROCHLORIDE AND ACETAMINOPHEN 1 TABLET: 5; 325 TABLET ORAL at 11:15

## 2021-06-02 RX ADMIN — SODIUM CHLORIDE, POTASSIUM CHLORIDE, SODIUM LACTATE AND CALCIUM CHLORIDE: 600; 310; 30; 20 INJECTION, SOLUTION INTRAVENOUS at 08:31

## 2021-06-02 RX ADMIN — PROPOFOL 200 MG: 10 INJECTION, EMULSION INTRAVENOUS at 08:35

## 2021-06-02 RX ADMIN — Medication 0.2 MG: at 08:45

## 2021-06-02 RX ADMIN — HYDROMORPHONE HYDROCHLORIDE 0.6 MG: 2 INJECTION, SOLUTION INTRAMUSCULAR; INTRAVENOUS; SUBCUTANEOUS at 09:27

## 2021-06-02 RX ADMIN — LIDOCAINE HYDROCHLORIDE 80 MG: 20 INJECTION, SOLUTION EPIDURAL; INFILTRATION; INTRACAUDAL; PERINEURAL at 08:35

## 2021-06-02 RX ADMIN — DEXMEDETOMIDINE HYDROCHLORIDE 4 MCG: 100 INJECTION, SOLUTION INTRAVENOUS at 09:07

## 2021-06-02 RX ADMIN — LIDOCAINE HYDROCHLORIDE 3 ML: 40 SOLUTION TOPICAL at 08:37

## 2021-06-02 RX ADMIN — ONDANSETRON 4 MG: 2 INJECTION INTRAMUSCULAR; INTRAVENOUS at 09:27

## 2021-06-02 RX ADMIN — ROCURONIUM BROMIDE 35 MG: 10 INJECTION, SOLUTION INTRAVENOUS at 08:45

## 2021-06-02 RX ADMIN — DEXMEDETOMIDINE HYDROCHLORIDE 8 MCG: 100 INJECTION, SOLUTION INTRAVENOUS at 08:32

## 2021-06-02 RX ADMIN — ONDANSETRON 4 MG: 2 INJECTION INTRAMUSCULAR; INTRAVENOUS at 10:19

## 2021-06-02 RX ADMIN — MAGNESIUM SULFATE HEPTAHYDRATE 0.5 G: 500 INJECTION, SOLUTION INTRAMUSCULAR; INTRAVENOUS at 08:45

## 2021-06-02 RX ADMIN — Medication 100 MG: at 08:35

## 2021-06-02 RX ADMIN — FENTANYL CITRATE 50 MCG: 50 INJECTION, SOLUTION INTRAMUSCULAR; INTRAVENOUS at 08:55

## 2021-06-02 RX ADMIN — HYDROMORPHONE HYDROCHLORIDE 0.5 MG: 2 INJECTION, SOLUTION INTRAMUSCULAR; INTRAVENOUS; SUBCUTANEOUS at 10:20

## 2021-06-02 RX ADMIN — DEXAMETHASONE SODIUM PHOSPHATE 8 MG: 4 INJECTION, SOLUTION INTRAMUSCULAR; INTRAVENOUS at 08:45

## 2021-06-02 RX ADMIN — MIDAZOLAM 2 MG: 1 INJECTION INTRAMUSCULAR; INTRAVENOUS at 08:31

## 2021-06-02 RX ADMIN — HYDROMORPHONE HYDROCHLORIDE 0.4 MG: 2 INJECTION, SOLUTION INTRAMUSCULAR; INTRAVENOUS; SUBCUTANEOUS at 09:24

## 2021-06-02 RX ADMIN — HYDROMORPHONE HYDROCHLORIDE 0.6 MG: 2 INJECTION, SOLUTION INTRAMUSCULAR; INTRAVENOUS; SUBCUTANEOUS at 09:30

## 2021-06-02 RX ADMIN — ROCURONIUM BROMIDE 5 MG: 10 INJECTION, SOLUTION INTRAVENOUS at 08:35

## 2021-06-02 RX ADMIN — DEXMEDETOMIDINE HYDROCHLORIDE 4 MCG: 100 INJECTION, SOLUTION INTRAVENOUS at 09:27

## 2021-06-02 RX ADMIN — SODIUM CHLORIDE, POTASSIUM CHLORIDE, SODIUM LACTATE AND CALCIUM CHLORIDE: 600; 310; 30; 20 INJECTION, SOLUTION INTRAVENOUS at 07:39

## 2021-06-02 RX ADMIN — FENTANYL CITRATE 50 MCG: 50 INJECTION, SOLUTION INTRAMUSCULAR; INTRAVENOUS at 08:35

## 2021-06-02 ASSESSMENT — PULMONARY FUNCTION TESTS
PIF_VALUE: 29
PIF_VALUE: 29
PIF_VALUE: 28
PIF_VALUE: 26
PIF_VALUE: 18
PIF_VALUE: 2
PIF_VALUE: 28
PIF_VALUE: 29
PIF_VALUE: 3
PIF_VALUE: 16
PIF_VALUE: 0
PIF_VALUE: 15
PIF_VALUE: 16
PIF_VALUE: 28
PIF_VALUE: 17
PIF_VALUE: 15
PIF_VALUE: 15
PIF_VALUE: 1
PIF_VALUE: 28
PIF_VALUE: 17
PIF_VALUE: 24
PIF_VALUE: 5
PIF_VALUE: 16
PIF_VALUE: 14
PIF_VALUE: 28
PIF_VALUE: 16
PIF_VALUE: 1
PIF_VALUE: 28
PIF_VALUE: 29
PIF_VALUE: 23
PIF_VALUE: 24
PIF_VALUE: 28
PIF_VALUE: 18
PIF_VALUE: 17
PIF_VALUE: 17
PIF_VALUE: 18
PIF_VALUE: 3
PIF_VALUE: 28
PIF_VALUE: 19
PIF_VALUE: 23
PIF_VALUE: 26
PIF_VALUE: 1
PIF_VALUE: 21
PIF_VALUE: 28
PIF_VALUE: 17
PIF_VALUE: 16
PIF_VALUE: 18
PIF_VALUE: 1
PIF_VALUE: 28
PIF_VALUE: 28
PIF_VALUE: 1
PIF_VALUE: 1
PIF_VALUE: 0
PIF_VALUE: 2
PIF_VALUE: 1
PIF_VALUE: 2
PIF_VALUE: 4
PIF_VALUE: 29
PIF_VALUE: 1
PIF_VALUE: 28
PIF_VALUE: 22
PIF_VALUE: 28
PIF_VALUE: 33
PIF_VALUE: 16
PIF_VALUE: 29
PIF_VALUE: 17
PIF_VALUE: 29
PIF_VALUE: 1
PIF_VALUE: 3
PIF_VALUE: 19
PIF_VALUE: 10
PIF_VALUE: 22
PIF_VALUE: 29

## 2021-06-02 ASSESSMENT — PAIN SCALES - GENERAL
PAINLEVEL_OUTOF10: 5
PAINLEVEL_OUTOF10: 9

## 2021-06-02 ASSESSMENT — PAIN DESCRIPTION - LOCATION
LOCATION: ABDOMEN
LOCATION: ABDOMEN

## 2021-06-02 ASSESSMENT — ENCOUNTER SYMPTOMS: SHORTNESS OF BREATH: 0

## 2021-06-02 ASSESSMENT — PAIN DESCRIPTION - PAIN TYPE
TYPE: SURGICAL PAIN
TYPE: SURGICAL PAIN

## 2021-06-02 ASSESSMENT — PAIN - FUNCTIONAL ASSESSMENT: PAIN_FUNCTIONAL_ASSESSMENT: 0-10

## 2021-06-02 ASSESSMENT — LIFESTYLE VARIABLES: SMOKING_STATUS: 1

## 2021-06-02 NOTE — PROGRESS NOTES
Pt awake and oriented, vs remain stable, pain 4/10. Fiance at bedside, denies further nausea. abd incisions CD&I, Dr Adria Castellanos at bedside to speak with pt.  Phase 1 discharge criteria met

## 2021-06-02 NOTE — OP NOTE
were ablated with laser. Following this, the remainder of the pelvis was inspected. There were  adhesions of the small bowel to the right sidewall. This was taken down  with CO2 laser. Following this, the endometriosis implants were noted  at the right fallopian tube remnant. The scar was inspected. A clear  defect was noted with adhesions of peritoneum and the round ligament to  the scar. These adhesions were lysed with CO2 laser. This was all  hemostatic. The procedure was concluded after copious irrigation. The  trocars were removed. The skin was closed with 4-0 Monocryl and  Dermabond. The patient tolerated the procedure well.         Nba Lopes MD    D: 06/02/2021 10:12:21       T: 06/02/2021 12:07:35     [id number]  Job#: [id number]     Doc#: [id number]    CC:

## 2021-06-02 NOTE — PROGRESS NOTES
CLINICAL PHARMACY NOTE: MEDS TO BEDS    Total # of Prescriptions Filled: 3   The following medications were delivered to the patient:  · Oxycodone  · Docusate  · Acetaminophen    Additional Documentation:    Delivered to patient    Swathi Epps

## 2021-06-02 NOTE — ANESTHESIA PRE PROCEDURE
Department of Anesthesiology  Preprocedure Note       Name:  Marisa Galicia   Age:  22 y.o.  :  1995                                          MRN:  [id number]         Date:  2021      Surgeon: Julian Mckee):  Josette Julio MD    Procedure: Procedure(s):  ROBOTIC ASSISTED RESECTION OF ENDOMETRIOSIS - CO2 LASER    Medications prior to admission:   Prior to Admission medications    Medication Sig Start Date End Date Taking? Authorizing Provider   levonorgestrel (MIRENA, 52 MG,) IUD 52 mg 1 each by Intrauterine route once   Yes Historical Provider, MD   amphetamine-dextroamphetamine (ADDERALL XR) 10 MG extended release capsule Take 10 mg by mouth 2 times daily. 21   Historical Provider, MD   clonazePAM (KLONOPIN) 2 MG tablet Take 2 mg by mouth 3 times daily as needed.   21   Historical Provider, MD   acetaminophen (TYLENOL) 500 MG tablet Take 1 tablet by mouth 4 times daily as needed for Pain 12/3/20   Sina Hence, [place]   propranolol (INDERAL) 20 MG tablet Take 20 mg by mouth daily     Historical Provider, MD   lithium 300 MG capsule Take 1 capsule by mouth 4 times daily  20   Historical Provider, MD   dicyclomine (BENTYL) 10 MG capsule Take 1 capsule by mouth every 6 hours as needed (cramps) 20   Lauragene Hence, [place]       Current medications:    Current Facility-Administered Medications   Medication Dose Route Frequency Provider Last Rate Last Admin    lactated ringers infusion   Intravenous Continuous Josette Julio MD        lidocaine PF 1 % injection 0.5 mL  0.5 mL Intradermal Once Josette Julio MD        HYDROmorphone (DILAUDID) injection 0.25 mg  0.25 mg Intravenous Q5 Min PRN Shira Tate MD        HYDROmorphone (DILAUDID) injection 0.5 mg  0.5 mg Intravenous Q5 Min PRN Shira Tate MD        HYDROcodone-acetaminophen (NORCO) 5-325 MG per tablet 1 tablet  1 tablet Oral PRN Shira Tate MD        Or    HYDROcodone-acetaminophen (NORCO) 5-325 MG per tablet 2 tablet  2 tablet Oral PRN Olegario Simon MD        ondansetron (ZOFRAN) injection 4 mg  4 mg Intravenous Once PRN Olegario Simon MD        labetalol (NORMODYNE;TRANDATE) injection 5 mg  5 mg Intravenous Q10 Min PRN Olegario Simon MD           Allergies: Allergies   Allergen Reactions    Etonogestrel      Other reaction(s):  Other (See Comments)  Per [facility] ([facility]), may have contributed to PE in 2017    Toradol [Ketorolac Tromethamine] Itching    Morphine Itching, Rash and Hives    Naprosyn [Naproxen] Hives, Swelling and Rash       Problem List:    Patient Active Problem List   Diagnosis Code    History of  delivery Z98.891    Menorrhagia with irregular cycle N92.1    Pulmonary embolism without acute cor pulmonale ([address] I26.99    Cannabis use disorder, moderate, dependence ([address] F12.20    Psychosis, paranoid ([address]    Tobacco use disorder F17.200    History of sexually transmitted disease Z86.19    Depressive disorder F32.9    Anxiety F41.9    Insomnia G47.00    Dysfunctional uterine bleeding N93.8       Past Medical History:        Diagnosis Date    Anxiety 2018    Depressive disorder 2018    History of pulmonary embolism 2017    implanted birth control? saw hematologist @ [address] [place] [address] Dr Braden of blood clots     Hypertension     Migraines     Psychosis ([facility] 2018    Smoker 2012       Past Surgical History:        Procedure Laterality Date     SECTION  11/28/2011    X2, 2017 and 2011     SECTION  2017    CHOLECYSTECTOMY, LAPAROSCOPIC  2017    ROBOTIC XI ASSISTED LAPAROSCOPIC CHOLECYSTECTOMY WITH CHOLANGIOGRAM    TUBAL LIGATION         Social History:    Social History     Tobacco Use    Smoking status: Current Every Day Smoker     Packs/day: 1.00     Years: 7.00     Pack years: 7.00     Types: Cigarettes    Smokeless tobacco: Never Used    Tobacco comment: off and on since Feb   Substance Use Topics    Alcohol use: Yes     Comment: socially Ready to quit: Not Answered  Counseling given: Not Answered  Comment: off and on since Feb      Vital Signs (Current):   Vitals:    05/25/21 1358   Weight: 164 lb (74.4 kg)   Height: 5' 5\" (1.651 m)                                              BP Readings from Last 3 Encounters:   05/03/21 122/82   03/05/21 102/66   12/03/20 (!) 100/54       NPO Status:  before mn                                                                                BMI:   Wt Readings from Last 3 Encounters:   05/25/21 164 lb (74.4 kg)   05/03/21 160 lb (72.6 kg)   03/05/21 162 lb (73.5 kg)     Body mass index is 27.29 kg/m². CBC:   Lab Results   Component Value Date    WBC 8.6 05/26/2021    RBC 4.53 05/26/2021    HGB 13.9 05/26/2021    HCT 40.7 05/26/2021    MCV 89.9 05/26/2021    RDW 13.7 05/26/2021     05/26/2021       CMP:   Lab Results   Component Value Date     12/03/2020    K 4.0 12/03/2020     12/03/2020    CO2 24 12/03/2020    BUN 9 12/03/2020    CREATININE 0.6 12/03/2020    GFRAA >60 12/03/2020    GFRAA >60 05/22/2013    AGRATIO 1.9 12/03/2020    LABGLOM >60 12/03/2020    GLUCOSE 90 12/03/2020    PROT 7.0 12/03/2020    PROT 7.8 10/08/2012    CALCIUM 9.3 12/03/2020    BILITOT 1.2 12/03/2020    ALKPHOS 47 12/03/2020    AST 15 12/03/2020    ALT 14 12/03/2020   pending ucg     POC Tests: No results for input(s): POCGLU, POCNA, POCK, POCCL, POCBUN, POCHEMO, POCHCT in the last 72 hours.     Coags:   Lab Results   Component Value Date    PROTIME 11.7 06/22/2017    INR 1.04 06/22/2017    APTT 96.4 02/16/2017       HCG (If Applicable):   Lab Results   Component Value Date    PREGTESTUR NEGATIVE 01/22/2020        ABGs: No results found for: PHART, PO2ART, CXY8ZOK, KUW8WIA, BEART, R2DVIEDN     Type & Screen (If Applicable):  Lab Results   Component Value Date    LABABO A  POSITIVE   01/22/2020       Drug/Infectious Status (If Applicable):  No results found for: HIV, HEPCAB    COVID-19 Screening (If Applicable):

## 2021-06-02 NOTE — PROGRESS NOTES
Pt nauseated- medicated with zofran, dilaudid given for pain, pt awake and oriented, keeps asking for \"fiance to be brought back\", weaned to RA, vss

## 2021-06-02 NOTE — ANESTHESIA POSTPROCEDURE EVALUATION
Department of Anesthesiology  Postprocedure Note    Patient: Roxanne Estrada  MRN: [id number]  YOB: 1995  Date of evaluation: 6/2/2021  Time:  9:58 AM     Procedure Summary     Date: 06/02/21 Room / Location: [address]    Anesthesia Start: 0831 Anesthesia Stop: 0940    Procedure: ROBOTIC ASSISTED RESECTION OF ENDOMETRIOSIS - CO2 LASER (N/A Abdomen) Diagnosis: (N80.9 ENDOMETRIOSIS)    Surgeons: Catalina Carson MD Responsible Provider: Olegario Simon MD    Anesthesia Type: general, other ASA Status: 2          Anesthesia Type: general, other    Preston Phase I: Preston Score: 10    Preston Phase II:      Last vitals: Reviewed and per EMR flowsheets.        Anesthesia Post Evaluation    Patient location during evaluation: PACU  Patient participation: complete - patient participated  Level of consciousness: awake and alert  Airway patency: patent  Nausea & Vomiting: no nausea and no vomiting  Complications: no  Cardiovascular status: blood pressure returned to baseline and hemodynamically stable  Respiratory status: acceptable  Hydration status: stable

## 2021-06-02 NOTE — PROGRESS NOTES
Pt admitted to PACU, not yet fully awake, oral away and simple mask in place. abd incisions x4 CD&I- ice pack in place, NSR on tele.  Will monitor

## 2021-06-02 NOTE — PROGRESS NOTES
Pharmacy at bedside to deliver meds, IV's removed, pt up to BR to void, pt discharged via wheelchair to [employer]'s car with instructions and belongings

## 2021-06-02 NOTE — H&P
Date of Surgery Update:  Saroj Gunderson was seen, history and physical examination reviewed, and patient examined by me today. There have been no significant clinical changes since the completion of the previous history and physical.    The risk, benefits, and alternatives of the proposed procedure have been explained to the patient (or appropriate guardian) and understanding verbalized. All questions answered. Patient wishes to proceed. Hx endometriosis, failed medical management. Will perform resection with laser ablation  Electronically signed by:  Aime Caraballo MD,6/2/2021,8:30 AM

## 2021-06-04 ENCOUNTER — APPOINTMENT (OUTPATIENT)
Dept: CT IMAGING | Age: 26
End: 2021-06-04
Payer: MEDICARE

## 2021-06-04 ENCOUNTER — HOSPITAL ENCOUNTER (EMERGENCY)
Age: 26
Discharge: HOME | End: 2021-06-04
Payer: MEDICARE

## 2021-06-04 VITALS
TEMPERATURE: 98.4 F | OXYGEN SATURATION: 99 % | RESPIRATION RATE: 18 BRPM | SYSTOLIC BLOOD PRESSURE: 128 MMHG | HEART RATE: 72 BPM | DIASTOLIC BLOOD PRESSURE: 89 MMHG

## 2021-06-04 DIAGNOSIS — G89.18 POSTOPERATIVE ABDOMINAL PAIN: Primary | ICD-10-CM

## 2021-06-04 DIAGNOSIS — R10.9 POSTOPERATIVE ABDOMINAL PAIN: Primary | ICD-10-CM

## 2021-06-04 DIAGNOSIS — R11.2 NON-INTRACTABLE VOMITING WITH NAUSEA, UNSPECIFIED VOMITING TYPE: ICD-10-CM

## 2021-06-04 LAB
A/G RATIO: 1.6 (ref 1.1–2.2)
ALBUMIN SERPL-MCNC: 4.1 G/DL (ref 3.4–5)
ALP BLD-CCNC: 44 U/L (ref 40–129)
ALT SERPL-CCNC: 14 U/L (ref 10–40)
ANION GAP SERPL CALCULATED.3IONS-SCNC: 9 MMOL/L (ref 3–16)
AST SERPL-CCNC: 15 U/L (ref 15–37)
BASOPHILS ABSOLUTE: 0.1 K/UL (ref 0–0.2)
BASOPHILS RELATIVE PERCENT: 0.9 %
BILIRUB SERPL-MCNC: 1.2 MG/DL (ref 0–1)
BILIRUBIN URINE: NEGATIVE
BLOOD, URINE: ABNORMAL
BUN BLDV-MCNC: 5 MG/DL (ref 7–20)
CALCIUM SERPL-MCNC: 9.3 MG/DL (ref 8.3–10.6)
CHLORIDE BLD-SCNC: 105 MMOL/L (ref 99–110)
CLARITY: CLEAR
CO2: 24 MMOL/L (ref 21–32)
COLOR: YELLOW
CREAT SERPL-MCNC: 0.5 MG/DL (ref 0.6–1.1)
EOSINOPHILS ABSOLUTE: 0.2 K/UL (ref 0–0.6)
EOSINOPHILS RELATIVE PERCENT: 2.2 %
EPITHELIAL CELLS, UA: 1 /HPF (ref 0–5)
GFR AFRICAN AMERICAN: >60
GFR NON-AFRICAN AMERICAN: >60
GLOBULIN: 2.5 G/DL
GLUCOSE BLD-MCNC: 95 MG/DL (ref 70–99)
GLUCOSE URINE: NEGATIVE MG/DL
HCG QUALITATIVE: NEGATIVE
HCT VFR BLD CALC: 40.7 % (ref 36–48)
HEMOGLOBIN: 13.9 G/DL (ref 12–16)
HYALINE CASTS: 0 /LPF (ref 0–8)
KETONES, URINE: NEGATIVE MG/DL
LEUKOCYTE ESTERASE, URINE: NEGATIVE
LIPASE: 18 U/L (ref 13–60)
LYMPHOCYTES ABSOLUTE: 2 K/UL (ref 1–5.1)
LYMPHOCYTES RELATIVE PERCENT: 25 %
MAGNESIUM: 2.1 MG/DL (ref 1.8–2.4)
MCH RBC QN AUTO: 31.3 PG (ref 26–34)
MCHC RBC AUTO-ENTMCNC: 34.2 G/DL (ref 31–36)
MCV RBC AUTO: 91.6 FL (ref 80–100)
MICROSCOPIC EXAMINATION: YES
MONOCYTES ABSOLUTE: 0.6 K/UL (ref 0–1.3)
MONOCYTES RELATIVE PERCENT: 7.7 %
NEUTROPHILS ABSOLUTE: 5.2 K/UL (ref 1.7–7.7)
NEUTROPHILS RELATIVE PERCENT: 64.2 %
NITRITE, URINE: NEGATIVE
PDW BLD-RTO: 13.9 % (ref 12.4–15.4)
PH UA: 7 (ref 5–8)
PLATELET # BLD: 238 K/UL (ref 135–450)
PMV BLD AUTO: 9 FL (ref 5–10.5)
POTASSIUM REFLEX MAGNESIUM: 3.5 MMOL/L (ref 3.5–5.1)
PROTEIN UA: NEGATIVE MG/DL
RBC # BLD: 4.44 M/UL (ref 4–5.2)
RBC UA: 10 /HPF (ref 0–4)
SODIUM BLD-SCNC: 138 MMOL/L (ref 136–145)
SPECIFIC GRAVITY UA: >1.03 (ref 1–1.03)
TOTAL PROTEIN: 6.6 G/DL (ref 6.4–8.2)
URINE REFLEX TO CULTURE: ABNORMAL
URINE TYPE: ABNORMAL
UROBILINOGEN, URINE: 1 E.U./DL
WBC # BLD: 8 K/UL (ref 4–11)
WBC UA: 1 /HPF (ref 0–5)

## 2021-06-04 PROCEDURE — 85025 COMPLETE CBC W/AUTO DIFF WBC: CPT

## 2021-06-04 PROCEDURE — 83735 ASSAY OF MAGNESIUM: CPT

## 2021-06-04 PROCEDURE — 36415 COLL VENOUS BLD VENIPUNCTURE: CPT

## 2021-06-04 PROCEDURE — 81001 URINALYSIS AUTO W/SCOPE: CPT

## 2021-06-04 PROCEDURE — 83690 ASSAY OF LIPASE: CPT

## 2021-06-04 PROCEDURE — 99283 EMERGENCY DEPT VISIT LOW MDM: CPT

## 2021-06-04 PROCEDURE — 84703 CHORIONIC GONADOTROPIN ASSAY: CPT

## 2021-06-04 PROCEDURE — 6360000002 HC RX W HCPCS: Performed by: PHYSICIAN ASSISTANT

## 2021-06-04 PROCEDURE — 96374 THER/PROPH/DIAG INJ IV PUSH: CPT

## 2021-06-04 PROCEDURE — 96375 TX/PRO/DX INJ NEW DRUG ADDON: CPT

## 2021-06-04 PROCEDURE — 6360000004 HC RX CONTRAST MEDICATION: Performed by: PHYSICIAN ASSISTANT

## 2021-06-04 PROCEDURE — 74177 CT ABD & PELVIS W/CONTRAST: CPT

## 2021-06-04 PROCEDURE — 80053 COMPREHEN METABOLIC PANEL: CPT

## 2021-06-04 RX ORDER — PROMETHAZINE HYDROCHLORIDE 25 MG/1
25 TABLET ORAL EVERY 6 HOURS PRN
Qty: 20 TABLET | Refills: 0 | Status: SHIPPED | OUTPATIENT
Start: 2021-06-04 | End: 2021-06-11

## 2021-06-04 RX ORDER — METOCLOPRAMIDE HYDROCHLORIDE 5 MG/ML
10 INJECTION INTRAMUSCULAR; INTRAVENOUS ONCE
Status: COMPLETED | OUTPATIENT
Start: 2021-06-04 | End: 2021-06-04

## 2021-06-04 RX ORDER — HYDROMORPHONE HYDROCHLORIDE 1 MG/ML
0.5 INJECTION, SOLUTION INTRAMUSCULAR; INTRAVENOUS; SUBCUTANEOUS ONCE
Status: COMPLETED | OUTPATIENT
Start: 2021-06-04 | End: 2021-06-04

## 2021-06-04 RX ORDER — ONDANSETRON 4 MG/1
4 TABLET, ORALLY DISINTEGRATING ORAL EVERY 8 HOURS PRN
Qty: 20 TABLET | Refills: 0 | Status: ON HOLD | OUTPATIENT
Start: 2021-06-04 | End: 2021-06-18 | Stop reason: HOSPADM

## 2021-06-04 RX ORDER — DIPHENHYDRAMINE HYDROCHLORIDE 50 MG/ML
25 INJECTION INTRAMUSCULAR; INTRAVENOUS ONCE
Status: COMPLETED | OUTPATIENT
Start: 2021-06-04 | End: 2021-06-04

## 2021-06-04 RX ADMIN — HYDROMORPHONE HYDROCHLORIDE 0.5 MG: 1 INJECTION, SOLUTION INTRAMUSCULAR; INTRAVENOUS; SUBCUTANEOUS at 12:42

## 2021-06-04 RX ADMIN — DIPHENHYDRAMINE HYDROCHLORIDE 25 MG: 50 INJECTION, SOLUTION INTRAMUSCULAR; INTRAVENOUS at 12:41

## 2021-06-04 RX ADMIN — IOPAMIDOL 75 ML: 755 INJECTION, SOLUTION INTRAVENOUS at 13:33

## 2021-06-04 RX ADMIN — METOCLOPRAMIDE 10 MG: 5 INJECTION, SOLUTION INTRAMUSCULAR; INTRAVENOUS at 12:39

## 2021-06-04 ASSESSMENT — PAIN SCALES - GENERAL
PAINLEVEL_OUTOF10: 8
PAINLEVEL_OUTOF10: 7

## 2021-06-04 ASSESSMENT — ENCOUNTER SYMPTOMS
RECTAL PAIN: 0
BLOOD IN STOOL: 0
VOMITING: 1
ANAL BLEEDING: 0
NAUSEA: 1
COUGH: 0
BACK PAIN: 0
DIARRHEA: 0
CHEST TIGHTNESS: 0
RESPIRATORY NEGATIVE: 1
CONSTIPATION: 0
ABDOMINAL DISTENTION: 0
SHORTNESS OF BREATH: 0
COLOR CHANGE: 0
ABDOMINAL PAIN: 1

## 2021-06-04 NOTE — ED PROVIDER NOTES
[facility]        Pt Name: Marisa Galicia  MRN: [id number]  [place] 1995  Date of evaluation: 6/4/2021  Provider: LOVELY Berg  PCP: Shanel Underwood MD  Note Started: 4:53 PM EDT       MANJIT. I have evaluated this patient. My supervising physician was available for consultation. CHIEF COMPLAINT       Chief Complaint   Patient presents with    Emesis     Post op day two with constant vomiting & pain to incision sites        HISTORY OF PRESENT ILLNESS   (Location, Timing/Onset, Context/Setting, Quality, Duration, Modifying Factors, Severity, Associated Signs and Symptoms)  Note limiting factors. Marisa Galicia is a 22 y.o. female with past medical she of anxiety, depression, previous PE, hypertension, migraines who presents to the ED with complaint of nausea and vomiting. Patient is 2 days postop from laparoscopic surgery for ablation and lysis of adhesions for endometriosis. States this was done by OB/GYN, Dr. Dylan Matthew, on 6/2/2021. Patient states ever since surgery she has had pain to her lower abdomen mostly over her incisions. States pain is sharp in nature rated 8/10. Patient that she was given pain medication but states she does not believe she is able to keep it down. Patient that she has had nausea and vomiting. Patient states she was not given anything for nausea and vomiting at home. Patient states she has had normal urinary output. Denies any dysuria, frequency, urgency or hematuria. Has passed gas. States has had small bowel movement secondary to decreased appetite/eating otherwise has not had any significant constipation or diarrhea. Denies abdominal distention. Denies fever chills. Denies rashes or lesions. Denies wound dehiscence. Denies any bleeding or drainage. Denies chest pain or shortness of breath.     Nursing Notes were all reviewed and agreed with or any disagreements were addressed in the HPI. REVIEW OF SYSTEMS    (2-9 systems for level 4, 10 or more for level 5)     Review of Systems   Constitutional: Negative for activity change, appetite change, chills and fever. Respiratory: Negative. Negative for cough, chest tightness and shortness of breath. Cardiovascular: Negative. Negative for chest pain, palpitations and leg swelling. Gastrointestinal: Positive for abdominal pain, nausea and vomiting. Negative for abdominal distention, anal bleeding, blood in stool, constipation, diarrhea and rectal pain. Genitourinary: Negative for decreased urine volume, difficulty urinating, dysuria, flank pain, frequency, hematuria, urgency, vaginal bleeding, vaginal discharge and vaginal pain. Musculoskeletal: Negative for arthralgias, back pain, myalgias, neck pain and neck stiffness. Skin: Negative for color change, pallor, rash and wound. Neurological: Negative for dizziness, light-headedness and headaches. Positives and Pertinent negatives as per HPI. Except as noted above in the ROS, all other systems were reviewed and negative.        PAST MEDICAL HISTORY     Past Medical History:   Diagnosis Date    Anxiety 2018    Depressive disorder 2018    History of pulmonary embolism 2017    implanted birth control? saw hematologist @ [address] [place] [address] Dr Braden of blood clots     Hypertension     Migraines     Psychosis ([facility] [place] [address] 2018    Smoker          SURGICAL HISTORY     Past Surgical History:   Procedure Laterality Date     SECTION  11/28/2011    X2, 2017 and 2011     SECTION  2017    CHOLECYSTECTOMY, LAPAROSCOPIC  2017    ROBOTIC XI ASSISTED LAPAROSCOPIC CHOLECYSTECTOMY WITH CHOLANGIOGRAM    LAPAROSCOPY N/A 2021    ROBOTIC ASSISTED RESECTION OF ENDOMETRIOSIS - CO2 LASER performed by Cora Vargas MD at [facility]       Discharge Medication List as of 2021  4:34 PM      CONTINUE these (36.9 °C) Oral 73 18 97 % -- --       Physical Exam  Constitutional:       General: She is not in acute distress. Appearance: Normal appearance. She is well-developed. She is not ill-appearing, toxic-appearing or diaphoretic. HENT:      Head: Normocephalic and atraumatic. Right Ear: External ear normal.      Left Ear: External ear normal.   Eyes:      General:         Right eye: No discharge. Left eye: No discharge. Cardiovascular:      Rate and Rhythm: Normal rate and regular rhythm. Pulses: Normal pulses. Heart sounds: Normal heart sounds. No murmur heard. No friction rub. No gallop. Pulmonary:      Effort: Pulmonary effort is normal. No respiratory distress. Breath sounds: Normal breath sounds. No stridor. No wheezing, rhonchi or rales. Chest:      Chest wall: No tenderness. Abdominal:      General: Abdomen is flat. Bowel sounds are normal. There is no distension. Palpations: Abdomen is soft. There is no mass. Tenderness: There is abdominal tenderness (To palpation of the lower abdomen specifically over the incisions. ). There is no right CVA tenderness, left CVA tenderness, guarding or rebound. Negative signs include Anderson's sign, Rovsing's sign and McBurney's sign. Hernia: No hernia is present. Comments: Surgical incisions noted to the abdomen. Surgical incisions appear to be well-healing. There is no wound dehiscence. No induration or fluctuance. No erythema or warmth. No bleeding or drainage. No crepitus. Musculoskeletal:         General: Normal range of motion. Cervical back: Normal range of motion and neck supple. Skin:     General: Skin is warm and dry. Coloration: Skin is not pale. Findings: No erythema. Neurological:      Mental Status: She is alert and oriented to person, place, and time.    Psychiatric:         Behavior: Behavior normal.         DIAGNOSTIC RESULTS   LABS:    Labs Reviewed   COMPREHENSIVE METABOLIC PANEL W/ REFLEX TO MG FOR LOW K - Abnormal; Notable for the following components:       Result Value    BUN 5 (*)     CREATININE 0.5 (*)     Total Bilirubin 1.2 (*)     All other components within normal limits    Narrative:     Performed at:  [facility] [email or web address]   Phone [phone]   URINE RT REFLEX TO CULTURE - Abnormal; Notable for the following components:    Blood, Urine MODERATE (*)     All other components within normal limits    Narrative:     Performed at:  [facility] [email or web address]   Phone [phone]   MICROSCOPIC URINALYSIS - Abnormal; Notable for the following components:    RBC, UA 10 (*)     All other components within normal limits    Narrative:     Performed at:  [facility] [email or web address]   Phone [phone]   CBC WITH AUTO DIFFERENTIAL    Narrative:     Performed at:  [facility] [email or web address]   Phone [phone]   HCG, SERUM, QUALITATIVE    Narrative:     Performed at:  [facility] [address]   Phone [phone]   LIPASE    Narrative:     Performed at:  [facility]  [facility]   Phone [phone]   MAGNESIUM    Narrative:     Performed at:  [facility] [email or web address]   Phone [phone]       All other labs were within normal range or not returned as of this dictation. EKG: All EKG's are interpreted by the Emergency Department Physician in the absence of a cardiologist.  Please see their note for interpretation of EKG.     RADIOLOGY:   Non-plain film images such as CT, Ultrasound and MRI are read by the radiologist. Plain radiographic images are visualized and Postoperative pneumoperitoneum and body wall emphysema. No abnormal fluid collection, hematoma, or other finding to account for postoperative abdominal pain           PROCEDURES   Unless otherwise noted below, none     Procedures    CRITICAL CARE TIME   N/A    CONSULTS:  IP CONSULT TO OB GYN      EMERGENCY DEPARTMENT COURSE and DIFFERENTIAL DIAGNOSIS/MDM:   Vitals:    Vitals:    06/04/21 1212 06/04/21 1245 06/04/21 1623   BP: 122/83 128/89    Pulse: 73  72   Resp: 18     Temp: 98.4 °F (36.9 °C)     TempSrc: Oral     SpO2: 97% 91% 99%       Patient was given the following medications:  Medications   HYDROmorphone HCl PF (DILAUDID) injection 0.5 mg (0.5 mg Intravenous Given 6/4/21 1242)   metoclopramide (REGLAN) injection 10 mg (10 mg Intravenous Given 6/4/21 1239)   diphenhydrAMINE (BENADRYL) injection 25 mg (25 mg Intravenous Given 6/4/21 1241)   iopamidol (ISOVUE-370) 76 % injection 75 mL (75 mLs Intravenous Given 6/4/21 1333)           Patient is a 80-year-old female who presents the ED with complaint of abdominal pain. Patient complaint of abdominal pain status post laparoscopic surgery for ablation and lysis of adhesions for endometriosis. Was done on 6/2/2021 by OB/GYN, Dr. Ayanna Rodríguez. Patient states she has abdominal pain mostly over incisions to her lower abdomen. Patient that she is had nausea and vomiting. Unable to keep her pain medication down at home secondary to nausea and vomiting. Patient came to the ED for further evaluation treatment. Upon examination patient afebrile stable vital signs. Nontoxic appearance. No chest pain or shortness of breath. Urinalysis showed 10 red blood cells but no signs of infection. CBC showed normal white count, hemoglobin and platelets. CMP relatively unremarkable. Pregnancy was negative.   Lipase normal.  Magnesium normal.  CT of the abdomen pelvis with IV contrast showed postoperative pneumoperitoneum with body wall emphysema most likely due to postoperative status at this time. There is no abnormal fluid collection, hematoma or acute abnormality noted. Patient likely some from postoperative abdominal pain at this time which I believe is most likely secondary to her postoperative status and also due to the fact that she has had nausea and vomiting difficulty maintaining her pain medication orally at home. Patient was given pain medication and nausea medication here in the ED. Upon repeat evaluation patient asking to leave. Patient states she is hungry and wants to get out here so she can go eat. Patient was given crackers and able to handle oral intake here in the ED without any further vomiting. Pain has improved. Did discuss the case with on-call OB/GYN, Dr. Pawan Wilburn, who was in agreement with plan. Patient be discharged home with prescriptions for nausea medication. Already has pain medication at home. Close return precautions. Follow-up with OB/GYN. Low suspicion for obstruction, ileus, peritonitis, perforation, pancreatitis, cholecystitis, appendicitis, diverticulitis, colitis, mesenteric ischemia, AAA, dissection, pyelonephritis, nephrolithiasis or other emergent etiology at this time. FINAL IMPRESSION      1. Postoperative abdominal pain    2. Non-intractable vomiting with nausea, unspecified vomiting type          DISPOSITION/PLAN   DISPOSITION Decision To Discharge 06/04/2021 04:23:20 PM      PATIENT REFERRED TO:  Jose Eduardo Daniels MD  [place] [id number]  [phone]    Schedule an appointment as soon as possible for a visit   As needed, If symptoms worsen    [facility] Emergency Department  [address] [place] [address]  [phone]  Go to   As needed, If symptoms worsen    Yoselin Augustin MD  [address], [address]  [phone]    Schedule an appointment as soon as possible for a visit   For a Re-check in  3-5    days.       DISCHARGE MEDICATIONS:  Discharge Medication List

## 2021-06-15 ENCOUNTER — HOSPITAL ENCOUNTER (OUTPATIENT)
Age: 26
Setting detail: OBSERVATION
Discharge: HOME | End: 2021-06-18
Attending: OBSTETRICS & GYNECOLOGY | Admitting: OBSTETRICS & GYNECOLOGY
Payer: MEDICARE

## 2021-06-15 DIAGNOSIS — Z98.890 POST-OPERATIVE NAUSEA AND VOMITING: Primary | ICD-10-CM

## 2021-06-15 DIAGNOSIS — R11.2 POST-OPERATIVE NAUSEA AND VOMITING: Primary | ICD-10-CM

## 2021-06-15 LAB
A/G RATIO: 1.6 (ref 1.1–2.2)
ALBUMIN SERPL-MCNC: 4.2 G/DL (ref 3.4–5)
ALP BLD-CCNC: 51 U/L (ref 40–129)
ALT SERPL-CCNC: 19 U/L (ref 10–40)
AMYLASE: 49 U/L (ref 25–115)
ANION GAP SERPL CALCULATED.3IONS-SCNC: 8 MMOL/L (ref 3–16)
AST SERPL-CCNC: 15 U/L (ref 15–37)
BASOPHILS ABSOLUTE: 0.1 K/UL (ref 0–0.2)
BASOPHILS RELATIVE PERCENT: 1.1 %
BILIRUB SERPL-MCNC: 0.8 MG/DL (ref 0–1)
BUN BLDV-MCNC: <2 MG/DL (ref 7–20)
CALCIUM SERPL-MCNC: 9.6 MG/DL (ref 8.3–10.6)
CHLORIDE BLD-SCNC: 106 MMOL/L (ref 99–110)
CO2: 24 MMOL/L (ref 21–32)
CREAT SERPL-MCNC: 0.5 MG/DL (ref 0.6–1.1)
EOSINOPHILS ABSOLUTE: 0.5 K/UL (ref 0–0.6)
EOSINOPHILS RELATIVE PERCENT: 6.9 %
GFR AFRICAN AMERICAN: >60
GFR NON-AFRICAN AMERICAN: >60
GLOBULIN: 2.6 G/DL
GLUCOSE BLD-MCNC: 90 MG/DL (ref 70–99)
HCT VFR BLD CALC: 40.5 % (ref 36–48)
HEMOGLOBIN: 13.6 G/DL (ref 12–16)
LIPASE: 19 U/L (ref 13–60)
LYMPHOCYTES ABSOLUTE: 1.8 K/UL (ref 1–5.1)
LYMPHOCYTES RELATIVE PERCENT: 22.4 %
MCH RBC QN AUTO: 31 PG (ref 26–34)
MCHC RBC AUTO-ENTMCNC: 33.6 G/DL (ref 31–36)
MCV RBC AUTO: 92.2 FL (ref 80–100)
MONOCYTES ABSOLUTE: 0.6 K/UL (ref 0–1.3)
MONOCYTES RELATIVE PERCENT: 7.7 %
NEUTROPHILS ABSOLUTE: 5 K/UL (ref 1.7–7.7)
NEUTROPHILS RELATIVE PERCENT: 61.9 %
PDW BLD-RTO: 14 % (ref 12.4–15.4)
PLATELET # BLD: 236 K/UL (ref 135–450)
PMV BLD AUTO: 8.9 FL (ref 5–10.5)
POTASSIUM SERPL-SCNC: 4 MMOL/L (ref 3.5–5.1)
RBC # BLD: 4.39 M/UL (ref 4–5.2)
SODIUM BLD-SCNC: 138 MMOL/L (ref 136–145)
TOTAL PROTEIN: 6.8 G/DL (ref 6.4–8.2)
WBC # BLD: 8 K/UL (ref 4–11)

## 2021-06-15 PROCEDURE — 96375 TX/PRO/DX INJ NEW DRUG ADDON: CPT

## 2021-06-15 PROCEDURE — 85025 COMPLETE CBC W/AUTO DIFF WBC: CPT

## 2021-06-15 PROCEDURE — 96372 THER/PROPH/DIAG INJ SC/IM: CPT

## 2021-06-15 PROCEDURE — 96361 HYDRATE IV INFUSION ADD-ON: CPT

## 2021-06-15 PROCEDURE — 6360000002 HC RX W HCPCS: Performed by: OBSTETRICS & GYNECOLOGY

## 2021-06-15 PROCEDURE — 96374 THER/PROPH/DIAG INJ IV PUSH: CPT

## 2021-06-15 PROCEDURE — 2580000003 HC RX 258: Performed by: OBSTETRICS & GYNECOLOGY

## 2021-06-15 PROCEDURE — 6370000000 HC RX 637 (ALT 250 FOR IP): Performed by: OBSTETRICS & GYNECOLOGY

## 2021-06-15 PROCEDURE — 36415 COLL VENOUS BLD VENIPUNCTURE: CPT

## 2021-06-15 PROCEDURE — 96376 TX/PRO/DX INJ SAME DRUG ADON: CPT

## 2021-06-15 PROCEDURE — G0379 DIRECT REFER HOSPITAL OBSERV: HCPCS

## 2021-06-15 PROCEDURE — 83690 ASSAY OF LIPASE: CPT

## 2021-06-15 PROCEDURE — 82150 ASSAY OF AMYLASE: CPT

## 2021-06-15 PROCEDURE — 80053 COMPREHEN METABOLIC PANEL: CPT

## 2021-06-15 PROCEDURE — G0378 HOSPITAL OBSERVATION PER HR: HCPCS

## 2021-06-15 RX ORDER — SODIUM CHLORIDE, SODIUM LACTATE, POTASSIUM CHLORIDE, CALCIUM CHLORIDE 600; 310; 30; 20 MG/100ML; MG/100ML; MG/100ML; MG/100ML
INJECTION, SOLUTION INTRAVENOUS CONTINUOUS
Status: DISCONTINUED | OUTPATIENT
Start: 2021-06-15 | End: 2021-06-15

## 2021-06-15 RX ORDER — OXYCODONE HYDROCHLORIDE 5 MG/1
10 TABLET ORAL EVERY 4 HOURS PRN
Status: DISCONTINUED | OUTPATIENT
Start: 2021-06-15 | End: 2021-06-18 | Stop reason: HOSPADM

## 2021-06-15 RX ORDER — ACETAMINOPHEN 325 MG/1
650 TABLET ORAL EVERY 4 HOURS PRN
Status: DISCONTINUED | OUTPATIENT
Start: 2021-06-15 | End: 2021-06-18 | Stop reason: HOSPADM

## 2021-06-15 RX ORDER — LITHIUM CARBONATE 300 MG/1
300 CAPSULE ORAL 4 TIMES DAILY
Status: DISCONTINUED | OUTPATIENT
Start: 2021-06-15 | End: 2021-06-15

## 2021-06-15 RX ORDER — PROPRANOLOL HYDROCHLORIDE 20 MG/1
20 TABLET ORAL DAILY
Status: DISCONTINUED | OUTPATIENT
Start: 2021-06-15 | End: 2021-06-18 | Stop reason: HOSPADM

## 2021-06-15 RX ORDER — SODIUM CHLORIDE, SODIUM LACTATE, POTASSIUM CHLORIDE, CALCIUM CHLORIDE 600; 310; 30; 20 MG/100ML; MG/100ML; MG/100ML; MG/100ML
INJECTION, SOLUTION INTRAVENOUS CONTINUOUS
Status: DISCONTINUED | OUTPATIENT
Start: 2021-06-15 | End: 2021-06-18 | Stop reason: HOSPADM

## 2021-06-15 RX ORDER — OXYCODONE HYDROCHLORIDE 5 MG/1
5 TABLET ORAL EVERY 4 HOURS PRN
Status: DISCONTINUED | OUTPATIENT
Start: 2021-06-15 | End: 2021-06-18 | Stop reason: HOSPADM

## 2021-06-15 RX ORDER — CLONAZEPAM 1 MG/1
2 TABLET ORAL 3 TIMES DAILY PRN
Status: DISCONTINUED | OUTPATIENT
Start: 2021-06-15 | End: 2021-06-18 | Stop reason: HOSPADM

## 2021-06-15 RX ORDER — OXYCODONE HYDROCHLORIDE AND ACETAMINOPHEN 5; 325 MG/1; MG/1
1 TABLET ORAL EVERY 4 HOURS PRN
Status: DISCONTINUED | OUTPATIENT
Start: 2021-06-15 | End: 2021-06-18 | Stop reason: HOSPADM

## 2021-06-15 RX ORDER — ONDANSETRON 2 MG/ML
4 INJECTION INTRAMUSCULAR; INTRAVENOUS EVERY 6 HOURS PRN
Status: DISCONTINUED | OUTPATIENT
Start: 2021-06-15 | End: 2021-06-15

## 2021-06-15 RX ORDER — ONDANSETRON 4 MG/1
4 TABLET, ORALLY DISINTEGRATING ORAL EVERY 8 HOURS PRN
Status: DISCONTINUED | OUTPATIENT
Start: 2021-06-15 | End: 2021-06-18 | Stop reason: HOSPADM

## 2021-06-15 RX ORDER — LORAZEPAM 2 MG/ML
1 INJECTION INTRAMUSCULAR EVERY 4 HOURS PRN
Status: DISCONTINUED | OUTPATIENT
Start: 2021-06-15 | End: 2021-06-18 | Stop reason: HOSPADM

## 2021-06-15 RX ORDER — DOCUSATE SODIUM 100 MG/1
100 CAPSULE, LIQUID FILLED ORAL DAILY PRN
Status: DISCONTINUED | OUTPATIENT
Start: 2021-06-15 | End: 2021-06-18 | Stop reason: HOSPADM

## 2021-06-15 RX ORDER — ONDANSETRON 2 MG/ML
4 INJECTION INTRAMUSCULAR; INTRAVENOUS EVERY 6 HOURS PRN
Status: DISCONTINUED | OUTPATIENT
Start: 2021-06-15 | End: 2021-06-18 | Stop reason: HOSPADM

## 2021-06-15 RX ORDER — LITHIUM CARBONATE 300 MG/1
600 CAPSULE ORAL 2 TIMES DAILY WITH MEALS
Status: DISCONTINUED | OUTPATIENT
Start: 2021-06-15 | End: 2021-06-18 | Stop reason: HOSPADM

## 2021-06-15 RX ORDER — DEXTROAMPHETAMINE SACCHARATE, AMPHETAMINE ASPARTATE MONOHYDRATE, DEXTROAMPHETAMINE SULFATE AND AMPHETAMINE SULFATE 2.5; 2.5; 2.5; 2.5 MG/1; MG/1; MG/1; MG/1
10 CAPSULE, EXTENDED RELEASE ORAL 2 TIMES DAILY
Status: DISCONTINUED | OUTPATIENT
Start: 2021-06-15 | End: 2021-06-18 | Stop reason: HOSPADM

## 2021-06-15 RX ORDER — PROMETHAZINE HYDROCHLORIDE 25 MG/ML
25 INJECTION, SOLUTION INTRAMUSCULAR; INTRAVENOUS EVERY 6 HOURS PRN
Status: DISCONTINUED | OUTPATIENT
Start: 2021-06-15 | End: 2021-06-18 | Stop reason: HOSPADM

## 2021-06-15 RX ORDER — ACETAMINOPHEN 500 MG
500 TABLET ORAL EVERY 6 HOURS PRN
Status: DISCONTINUED | OUTPATIENT
Start: 2021-06-15 | End: 2021-06-15

## 2021-06-15 RX ADMIN — SODIUM CHLORIDE, POTASSIUM CHLORIDE, SODIUM LACTATE AND CALCIUM CHLORIDE: 600; 310; 30; 20 INJECTION, SOLUTION INTRAVENOUS at 14:11

## 2021-06-15 RX ADMIN — LORAZEPAM 1 MG: 2 INJECTION INTRAMUSCULAR; INTRAVENOUS at 21:01

## 2021-06-15 RX ADMIN — LITHIUM CARBONATE 600 MG: 300 CAPSULE, GELATIN COATED ORAL at 22:14

## 2021-06-15 RX ADMIN — SODIUM CHLORIDE, POTASSIUM CHLORIDE, SODIUM LACTATE AND CALCIUM CHLORIDE: 600; 310; 30; 20 INJECTION, SOLUTION INTRAVENOUS at 18:37

## 2021-06-15 RX ADMIN — PROMETHAZINE HYDROCHLORIDE 25 MG: 25 INJECTION INTRAMUSCULAR; INTRAVENOUS at 22:12

## 2021-06-15 RX ADMIN — LORAZEPAM 1 MG: 2 INJECTION INTRAMUSCULAR; INTRAVENOUS at 14:12

## 2021-06-15 RX ADMIN — CLONAZEPAM 2 MG: 1 TABLET ORAL at 22:14

## 2021-06-15 RX ADMIN — ONDANSETRON 4 MG: 2 INJECTION INTRAMUSCULAR; INTRAVENOUS at 20:20

## 2021-06-15 RX ADMIN — OXYCODONE 10 MG: 5 TABLET ORAL at 20:57

## 2021-06-15 RX ADMIN — LORAZEPAM 1 MG: 2 INJECTION INTRAMUSCULAR; INTRAVENOUS at 17:17

## 2021-06-15 RX ADMIN — OXYCODONE HYDROCHLORIDE AND ACETAMINOPHEN 1 TABLET: 5; 325 TABLET ORAL at 17:17

## 2021-06-15 RX ADMIN — ONDANSETRON 4 MG: 2 INJECTION INTRAMUSCULAR; INTRAVENOUS at 14:12

## 2021-06-15 RX ADMIN — OXYCODONE HYDROCHLORIDE AND ACETAMINOPHEN 1 TABLET: 5; 325 TABLET ORAL at 14:52

## 2021-06-15 ASSESSMENT — PAIN DESCRIPTION - ORIENTATION: ORIENTATION: MID;LOWER

## 2021-06-15 ASSESSMENT — PAIN DESCRIPTION - LOCATION
LOCATION: ABDOMEN

## 2021-06-15 ASSESSMENT — PAIN DESCRIPTION - DESCRIPTORS
DESCRIPTORS: ACHING
DESCRIPTORS: ACHING
DESCRIPTORS: ACHING;BURNING;CRAMPING;PRESSURE

## 2021-06-15 ASSESSMENT — PAIN DESCRIPTION - PAIN TYPE
TYPE: SURGICAL PAIN

## 2021-06-15 ASSESSMENT — PAIN SCALES - GENERAL
PAINLEVEL_OUTOF10: 10
PAINLEVEL_OUTOF10: 8
PAINLEVEL_OUTOF10: 3
PAINLEVEL_OUTOF10: 8
PAINLEVEL_OUTOF10: 7

## 2021-06-15 ASSESSMENT — PAIN DESCRIPTION - FREQUENCY: FREQUENCY: INTERMITTENT

## 2021-06-15 NOTE — PROGRESS NOTES
Patient admitted to room 3320. Dr. Vargas Estrada notified of patient's arrival. Waiting for orders to be placed.

## 2021-06-15 NOTE — FLOWSHEET NOTE
06/15/21 1315   Vital Signs   Temp 98.6 °F (37 °C)   Temp Source Oral   Pulse 65   Heart Rate Source Monitor   Resp 18   BP (!) 173/106   BP Location Right upper arm   Patient Position High fowlers   Level of Consciousness Alert (0)   MEWS Score 1   Patient Currently in Pain Yes   Pain Assessment   Pain Assessment 0-10   Pain Level 10   Pain Type Surgical pain   Pain Location Abdomen   Oxygen Therapy   SpO2 100 %   Pulse Oximeter Device Mode Intermittent   Pulse Oximeter Device Location Finger   O2 Device None (Room air)   Height and Weight   Height 5' 5\" (1.651 m)   Weight 166 lb (75.3 kg)   Weight Method Stated   BSA (Calculated - sq m) 1.86 sq meters   BMI (Calculated) 27.7     Pt. To room 3320. Pt. VSS, patient denies pain, patient oriented to room. Pt. Updated on POC, denies questions. Pt. Given toiletries, denies further needs. Bed in lowest position, bed alarm activated, call light and bedside table within reach. Will continue to monitor.     Osmani Chung RN

## 2021-06-15 NOTE — H&P
Every day    Substances: Flavoring    Devices: Disposable   Substance and Sexual Activity    Alcohol use: Yes     Comment: socially    Drug use: Never    Sexual activity: Not Currently     Partners: Male   Other Topics Concern    Not on file   Social History Narrative    Not on file     Social Determinants of Health     Financial Resource Strain: Low Risk     Difficulty of Paying Living Expenses: Not hard at all   Food Insecurity: No Food Insecurity    Worried About Running Out of Food in the Last Year: Never true    Erika of Food in the Last Year: Never true   Transportation Needs: No Transportation Needs    Lack of Transportation (Medical): No    Lack of Transportation (Non-Medical):  No   Physical Activity:     Days of Exercise per Week:     Minutes of Exercise per Session:    Stress:     Feeling of Stress :    Social Connections:     Frequency of Communication with Friends and Family:     Frequency of Social Gatherings with Friends and Family:     Attends Sikh Services:     Active Member of Clubs or Organizations:     Attends Club or Organization Meetings:     Marital Status:    Intimate Partner Violence:     Fear of Current or Ex-Partner:     Emotionally Abused:     Physically Abused:     Sexually Abused:      Family History:       Problem Relation Age of Onset    Hypertension Father     Hypertension Mother     Bipolar Disorder Mother     Anxiety Disorder Mother     Depression Mother     Depression Sister     Depression Brother      Medications Prior to Admission:  Medications Prior to Admission: ondansetron (ZOFRAN ODT) 4 MG disintegrating tablet, Take 1 tablet by mouth every 8 hours as needed for Nausea  acetaminophen (TYLENOL) 500 MG tablet, Take 1 tablet by mouth 4 times daily as needed for Pain  docusate sodium (COLACE) 100 MG capsule, Take 1 capsule by mouth daily as needed for Constipation  amphetamine-dextroamphetamine (ADDERALL XR) 10 MG extended release capsule, Take 10 mg by mouth 2 times daily. clonazePAM (KLONOPIN) 2 MG tablet, Take 2 mg by mouth 3 times daily as needed. propranolol (INDERAL) 20 MG tablet, Take 20 mg by mouth daily   lithium 300 MG capsule, Take 1 capsule by mouth 4 times daily   dicyclomine (BENTYL) 10 MG capsule, Take 1 capsule by mouth every 6 hours as needed (cramps)    REVIEW OF SYSTEMS:    CONSTITUTIONAL:  negative  RESPIRATORY:  negative  CARDIOVASCULAR:  negative  GASTROINTESTINAL:  negative  ALLERGIC/IMMUNOLOGIC:  negative  NEUROLOGICAL:  negative  BEHAVIOR/PSYCH:  negative    PHYSICAL EXAM:  Vitals:    06/15/21 1315 06/15/21 1715   BP: (!) 173/106 125/86   Pulse: 65 71   Resp: 18 16   Temp: 98.6 °F (37 °C) 98.7 °F (37.1 °C)   TempSrc: Oral Oral   SpO2: 100% 100%   Weight: 166 lb (75.3 kg)    Height: 5' 5\" (1.651 m)      General appearance:  awake, alert, cooperative, no apparent distress, and appears stated age  Neurologic:  Awake, alert, oriented to name, place and time. Lungs:  No increased work of breathing, good air exchange  Abdomen:  Soft, non tender, +bs    Lab Results   Component Value Date    WBC 8.0 06/15/2021    HGB 13.6 06/15/2021    HCT 40.5 06/15/2021    MCV 92.2 06/15/2021     06/15/2021     Lab Results   Component Value Date    CREATININE 0.5 (L) 06/15/2021    BUN <2 (L) 06/15/2021     06/15/2021    K 4.0 06/15/2021     06/15/2021    CO2 24 06/15/2021     Lab Results   Component Value Date    ALT 19 06/15/2021    AST 15 06/15/2021    ALKPHOS 51 06/15/2021    BILITOT 0.8 06/15/2021       ASSESSMENT AND PLAN:    Persistent N/V following surgery. Worsening pain after fall this past weekend. Will iv hydrate and have CT abd/pelvis with po contrast tomorrow am.  NPO after midnight. OK to have dinner now.   If abnormal CT or worsening status the surgery consult

## 2021-06-16 ENCOUNTER — APPOINTMENT (OUTPATIENT)
Dept: CT IMAGING | Age: 26
End: 2021-06-16
Attending: OBSTETRICS & GYNECOLOGY
Payer: MEDICARE

## 2021-06-16 LAB
A/G RATIO: 1.8 (ref 1.1–2.2)
ALBUMIN SERPL-MCNC: 3.5 G/DL (ref 3.4–5)
ALP BLD-CCNC: 44 U/L (ref 40–129)
ALT SERPL-CCNC: 30 U/L (ref 10–40)
ANION GAP SERPL CALCULATED.3IONS-SCNC: 5 MMOL/L (ref 3–16)
AST SERPL-CCNC: 40 U/L (ref 15–37)
BILIRUB SERPL-MCNC: 1.2 MG/DL (ref 0–1)
BUN BLDV-MCNC: <2 MG/DL (ref 7–20)
CALCIUM SERPL-MCNC: 9 MG/DL (ref 8.3–10.6)
CHLORIDE BLD-SCNC: 110 MMOL/L (ref 99–110)
CO2: 26 MMOL/L (ref 21–32)
CREAT SERPL-MCNC: 0.6 MG/DL (ref 0.6–1.1)
GFR AFRICAN AMERICAN: >60
GFR NON-AFRICAN AMERICAN: >60
GLOBULIN: 2 G/DL
GLUCOSE BLD-MCNC: 95 MG/DL (ref 70–99)
HCT VFR BLD CALC: 37.1 % (ref 36–48)
HEMOGLOBIN: 12.8 G/DL (ref 12–16)
MCH RBC QN AUTO: 31.8 PG (ref 26–34)
MCHC RBC AUTO-ENTMCNC: 34.5 G/DL (ref 31–36)
MCV RBC AUTO: 92.2 FL (ref 80–100)
PDW BLD-RTO: 14 % (ref 12.4–15.4)
PLATELET # BLD: 213 K/UL (ref 135–450)
PMV BLD AUTO: 8.6 FL (ref 5–10.5)
POTASSIUM SERPL-SCNC: 4 MMOL/L (ref 3.5–5.1)
RBC # BLD: 4.02 M/UL (ref 4–5.2)
SODIUM BLD-SCNC: 141 MMOL/L (ref 136–145)
TOTAL PROTEIN: 5.5 G/DL (ref 6.4–8.2)
WBC # BLD: 7.3 K/UL (ref 4–11)

## 2021-06-16 PROCEDURE — G0378 HOSPITAL OBSERVATION PER HR: HCPCS

## 2021-06-16 PROCEDURE — 6360000002 HC RX W HCPCS: Performed by: OBSTETRICS & GYNECOLOGY

## 2021-06-16 PROCEDURE — 6370000000 HC RX 637 (ALT 250 FOR IP): Performed by: OBSTETRICS & GYNECOLOGY

## 2021-06-16 PROCEDURE — 36415 COLL VENOUS BLD VENIPUNCTURE: CPT

## 2021-06-16 PROCEDURE — 84703 CHORIONIC GONADOTROPIN ASSAY: CPT

## 2021-06-16 PROCEDURE — 96361 HYDRATE IV INFUSION ADD-ON: CPT

## 2021-06-16 PROCEDURE — 96372 THER/PROPH/DIAG INJ SC/IM: CPT

## 2021-06-16 PROCEDURE — 6360000002 HC RX W HCPCS: Performed by: PHYSICIAN ASSISTANT

## 2021-06-16 PROCEDURE — 6360000004 HC RX CONTRAST MEDICATION: Performed by: OBSTETRICS & GYNECOLOGY

## 2021-06-16 PROCEDURE — 85027 COMPLETE CBC AUTOMATED: CPT

## 2021-06-16 PROCEDURE — 96375 TX/PRO/DX INJ NEW DRUG ADDON: CPT

## 2021-06-16 PROCEDURE — C9113 INJ PANTOPRAZOLE SODIUM, VIA: HCPCS | Performed by: PHYSICIAN ASSISTANT

## 2021-06-16 PROCEDURE — 80053 COMPREHEN METABOLIC PANEL: CPT

## 2021-06-16 PROCEDURE — 6360000004 HC RX CONTRAST MEDICATION

## 2021-06-16 PROCEDURE — 96376 TX/PRO/DX INJ SAME DRUG ADON: CPT

## 2021-06-16 PROCEDURE — 74177 CT ABD & PELVIS W/CONTRAST: CPT

## 2021-06-16 PROCEDURE — 2580000003 HC RX 258: Performed by: OBSTETRICS & GYNECOLOGY

## 2021-06-16 RX ORDER — PANTOPRAZOLE SODIUM 40 MG/10ML
40 INJECTION, POWDER, LYOPHILIZED, FOR SOLUTION INTRAVENOUS DAILY
Status: DISCONTINUED | OUTPATIENT
Start: 2021-06-16 | End: 2021-06-18 | Stop reason: HOSPADM

## 2021-06-16 RX ADMIN — PANTOPRAZOLE SODIUM 40 MG: 40 INJECTION, POWDER, FOR SOLUTION INTRAVENOUS at 16:12

## 2021-06-16 RX ADMIN — OXYCODONE 10 MG: 5 TABLET ORAL at 16:23

## 2021-06-16 RX ADMIN — OXYCODONE 10 MG: 5 TABLET ORAL at 20:38

## 2021-06-16 RX ADMIN — LITHIUM CARBONATE 600 MG: 300 CAPSULE, GELATIN COATED ORAL at 20:38

## 2021-06-16 RX ADMIN — LORAZEPAM 1 MG: 2 INJECTION INTRAMUSCULAR; INTRAVENOUS at 20:38

## 2021-06-16 RX ADMIN — ONDANSETRON 4 MG: 2 INJECTION INTRAMUSCULAR; INTRAVENOUS at 07:29

## 2021-06-16 RX ADMIN — LORAZEPAM 1 MG: 2 INJECTION INTRAMUSCULAR; INTRAVENOUS at 16:23

## 2021-06-16 RX ADMIN — LITHIUM CARBONATE 600 MG: 300 CAPSULE, GELATIN COATED ORAL at 11:37

## 2021-06-16 RX ADMIN — PROPRANOLOL HYDROCHLORIDE 20 MG: 20 TABLET ORAL at 11:37

## 2021-06-16 RX ADMIN — IOPAMIDOL 75 ML: 755 INJECTION, SOLUTION INTRAVENOUS at 10:18

## 2021-06-16 RX ADMIN — PROMETHAZINE HYDROCHLORIDE 25 MG: 25 INJECTION INTRAMUSCULAR; INTRAVENOUS at 19:54

## 2021-06-16 RX ADMIN — OXYCODONE 10 MG: 5 TABLET ORAL at 12:34

## 2021-06-16 RX ADMIN — IOHEXOL 50 ML: 240 INJECTION, SOLUTION INTRATHECAL; INTRAVASCULAR; INTRAVENOUS; ORAL at 06:41

## 2021-06-16 RX ADMIN — LORAZEPAM 1 MG: 2 INJECTION INTRAMUSCULAR; INTRAVENOUS at 07:30

## 2021-06-16 RX ADMIN — SODIUM CHLORIDE, POTASSIUM CHLORIDE, SODIUM LACTATE AND CALCIUM CHLORIDE: 600; 310; 30; 20 INJECTION, SOLUTION INTRAVENOUS at 00:14

## 2021-06-16 RX ADMIN — SODIUM CHLORIDE, POTASSIUM CHLORIDE, SODIUM LACTATE AND CALCIUM CHLORIDE: 600; 310; 30; 20 INJECTION, SOLUTION INTRAVENOUS at 16:14

## 2021-06-16 RX ADMIN — PROMETHAZINE HYDROCHLORIDE 25 MG: 25 INJECTION INTRAMUSCULAR; INTRAVENOUS at 05:14

## 2021-06-16 RX ADMIN — OXYCODONE 10 MG: 5 TABLET ORAL at 06:07

## 2021-06-16 ASSESSMENT — PAIN DESCRIPTION - PAIN TYPE
TYPE: SURGICAL PAIN
TYPE: ACUTE PAIN
TYPE: SURGICAL PAIN

## 2021-06-16 ASSESSMENT — PAIN DESCRIPTION - LOCATION
LOCATION: ABDOMEN

## 2021-06-16 ASSESSMENT — PAIN SCALES - GENERAL
PAINLEVEL_OUTOF10: 6
PAINLEVEL_OUTOF10: 0
PAINLEVEL_OUTOF10: 2
PAINLEVEL_OUTOF10: 7
PAINLEVEL_OUTOF10: 0
PAINLEVEL_OUTOF10: 6
PAINLEVEL_OUTOF10: 5
PAINLEVEL_OUTOF10: 8
PAINLEVEL_OUTOF10: 8
PAINLEVEL_OUTOF10: 4

## 2021-06-16 ASSESSMENT — PAIN DESCRIPTION - DESCRIPTORS
DESCRIPTORS: ACHING

## 2021-06-16 NOTE — PLAN OF CARE
Problem: Pain:  Description: Pain management should include both nonpharmacologic and pharmacologic interventions. Goal: Pain level will decrease  Description: Pain level will decrease  Outcome: Ongoing  Note: Pain medications given per MAR. Pt satisfied. Will continue to monitor. Problem: Bowel/Gastric:  Goal: Diagnostic test results will improve  Description: Diagnostic test results will improve  Outcome: Ongoing  Note: Pt to have CT scan of abdomen/pelvis with oral contrast in morning. Goal: Occurrences of nausea will decrease  Description: Occurrences of nausea will decrease  Outcome: Ongoing  Note: Pt vomited x1. Antiemetics given, pt reported feeling better, pt able to tolerates PO pain and anxiety medications. Pt is NPO at midnight for testing in the morning. OBGYN following. Will continue to monitor. Problem: Fluid Volume:  Goal: Maintenance of adequate hydration will improve  Description: Maintenance of adequate hydration will improve  Outcome: Ongoing  Note: IVF infusing for hydration. Will continue to monitor.       Problem: Nausea/Vomiting:  Goal: Absence of nausea/vomiting  Description: Absence of nausea/vomiting  Outcome: Ongoing  Goal: Able to drink  Description: Able to drink  Outcome: Ongoing  Goal: Able to eat  Description: Able to eat  Outcome: Ongoing  Goal: Ability to achieve adequate nutritional intake will improve  Description: Ability to achieve adequate nutritional intake will improve  Outcome: Ongoing

## 2021-06-16 NOTE — PROGRESS NOTES
HD#2    Mild improvement but did have emesis overnight. Abdominal pain has improved since last night    Vitals:    06/16/21 0513 06/16/21 0715 06/16/21 0758 06/16/21 1130   BP: 108/71  (!) 136/92 115/65   Pulse: 54  63 61   Resp: 18 16 16   Temp: 97.6 °F (36.4 °C)  97.5 °F (36.4 °C)    TempSrc: Oral  Oral    SpO2: 98%  100%    Weight:  162 lb 4.8 oz (73.6 kg)     Height:             Intake/Output Summary (Last 24 hours) at 6/16/2021 1534  Last data filed at 6/16/2021 0758  Gross per 24 hour   Intake 3138.99 ml   Output --   Net 3138.99 ml       CV RRR  Pulm CTA  Abd soft    Lab Results   Component Value Date    WBC 7.3 06/16/2021    HGB 12.8 06/16/2021    HCT 37.1 06/16/2021    MCV 92.2 06/16/2021     06/16/2021     Lab Results   Component Value Date    CREATININE 0.6 06/16/2021    BUN <2 (L) 06/16/2021     06/16/2021    K 4.0 06/16/2021     06/16/2021    CO2 26 06/16/2021     Lab Results   Component Value Date    ALT 30 06/16/2021    AST 40 (H) 06/16/2021    ALKPHOS 44 06/16/2021    BILITOT 1.2 (H) 06/16/2021         Persistent nausea vomiting. No evidence of postoperative injury. Will follow GI recommendations with imaging as planned.

## 2021-06-16 NOTE — CONSULTS
Gastroenterology Consult Note        Patient: Sonia Smith  : 1995  Acct#:      Date:  2021    Subjective:       History of Present Illness  Patient is a 22 y.o.  female admitted with Post-operative nausea and vomiting [R11.2, Z98.890] who is seen in consult for N/V and abdominal pain. S/p carmen. Patient with history of pain due to endometriosis and underwent surgical treatment with ablation and lysis of adhesions on 2021. Patient reports nausea and vomiting ever since even despite Zofran. No history of chronic nausea and vomiting. Also reports abdominal pain near her incisions as well as diffuse pain and pain in the epigastrium. States epigastric pain is from all her vomiting. Denies hematemesis, melena, hematochezia, change in bowel habits. States stools are loose at baseline. Denies NSAID use. Past Medical History:   Diagnosis Date    Anxiety 2018    Depressive disorder 2018    History of pulmonary embolism 2017    implanted birth control? saw hematologist @ [address] [place] [address] Dr Braden of blood clots     Hypertension     Migraines     Psychosis ([facility] [address] 2018    Smoker       Past Surgical History:   Procedure Laterality Date     SECTION  11/28/2011    X2, 2017 and 2011     SECTION  2017    CHOLECYSTECTOMY, LAPAROSCOPIC  2017    ROBOTIC XI ASSISTED LAPAROSCOPIC CHOLECYSTECTOMY WITH CHOLANGIOGRAM    LAPAROSCOPY N/A 2021    ROBOTIC ASSISTED RESECTION OF ENDOMETRIOSIS - CO2 LASER performed by Orville Lowe MD at [address] [place] [address]        Past Endoscopic History: none     Admission Meds  No current facility-administered medications on file prior to encounter.      Current Outpatient Medications on File Prior to Encounter   Medication Sig Dispense Refill    ondansetron (ZOFRAN ODT) 4 MG disintegrating tablet Take 1 tablet by mouth every 8 hours as needed for Nausea 20 tablet 0    acetaminophen (TYLENOL) 500 MG tablet Take 1 tablet by mouth 4 times daily as needed for Pain 120 tablet 0    docusate sodium (COLACE) 100 MG capsule Take 1 capsule by mouth daily as needed for Constipation 60 capsule 1    amphetamine-dextroamphetamine (ADDERALL XR) 10 MG extended release capsule Take 10 mg by mouth 2 times daily.  clonazePAM (KLONOPIN) 2 MG tablet Take 2 mg by mouth 3 times daily as needed.  propranolol (INDERAL) 20 MG tablet Take 20 mg by mouth daily       lithium 300 MG capsule Take 1 capsule by mouth 4 times daily       dicyclomine (BENTYL) 10 MG capsule Take 1 capsule by mouth every 6 hours as needed (cramps) 20 capsule 0          Allergies  Allergies   Allergen Reactions    Etonogestrel      Other reaction(s):  Other (See Comments)  Per [facility] may have contributed to PE in 2017    Toradol [Ketorolac Tromethamine] Itching    Morphine Itching, Rash and Hives    Naprosyn [Naproxen] Hives, Swelling and Rash      Social   Social History     Tobacco Use    Smoking status: Current Every Day Smoker     Packs/day: 1.00     Years: 7.00     Pack years: 7.00     Types: Cigarettes, E-Cigarettes    Smokeless tobacco: Never Used    Tobacco comment: off and on since Feb   Substance Use Topics    Alcohol use: Yes     Comment: socially        Family History   Problem Relation Age of Onset    Hypertension Father     Hypertension Mother     Bipolar Disorder Mother     Anxiety Disorder Mother     Depression Mother     Depression Sister     Depression Brother          Review of Systems  Constitutional: negative for fevers, chills, sweats    Ears, nose, mouth, throat, and face: negative for nasal congestion and sore throat   Respiratory: negative for cough and shortness of breath   Cardiovascular: negative for chest pain and dyspnea   Gastrointestinal: see hpi   Genitourinary:negative for dysuria and frequency   Integument/breast: negative for pruritus and rash   Hematologic/lymphatic: negative for bleeding and easy bruising   Musculoskeletal:negative for arthralgias and myalgias   Neurological: negative for dizziness and weakness         Physical Exam  Blood pressure 115/65, pulse 61, temperature 97.5 °F (36.4 °C), temperature source Oral, resp. rate 16, height 5' 5\" (1.651 m), weight 162 lb 4.8 oz (73.6 kg), SpO2 100 %, not currently breastfeeding. General appearance: alert, tearful, anxious, appears stated age  Eyes: Anicteric  Head: Normocephalic, without obvious abnormality  Lungs: clear to auscultation bilaterally, Normal Effort  Heart: regular rate and rhythm, normal S1 and S2, no murmurs or rubs  Abdomen: soft, tenderness near incisions and epigastrium. Bowel sounds normal. No masses,  no organomegaly. Extremities: atraumatic, no cyanosis or edema  Skin: warm and dry, no jaundice  Neuro: Grossly intact, A&OX3  Musculoskeletal: 5/5  strength BUE      Data Review:    Recent Labs     06/15/21  1411 06/16/21  0510   WBC 8.0 7.3   HGB 13.6 12.8   HCT 40.5 37.1   MCV 92.2 92.2    213     Recent Labs     06/15/21  1411 06/16/21  0510    141   K 4.0 4.0    110   CO2 24 26   BUN <2* <2*   CREATININE 0.5* 0.6     Recent Labs     06/15/21  1411 06/16/21  0510   AST 15 40*   ALT 19 30   BILITOT 0.8 1.2*   ALKPHOS 51 44     Recent Labs     06/15/21  1411   LIPASE 19.0   AMYLASE 49     No results for input(s): PROTIME, INR in the last 72 hours. No results for input(s): PTT in the last 72 hours. No results for input(s): OCCULTBLD in the last 72 hours. Imaging Studies:                               CT-scan of abdomen and pelvis w iv and po contrast 6/16/21:  Impression   1. No acute abnormality. Assessment:     Active Problems:    Post-operative nausea and vomiting  Resolved Problems:    * No resolved hospital problems. *    Nausea and vomiting -began after surgery 2 weeks ago. Labs with minimal elevation of AST and bili.   CT was normal.  Did note mild intrahepatic biliary dilation so will check MRCP. Consider PUD, gastroparesis. Recommendations:   -PPI  -Antiemetics  -check MRCP given intrahepatic biliary dilation on CT  -can plan EGD tomorrow  - if all negative, consider gastric emptying study to eval for gastroparesis    Discussed with Dr. Lady Delarosa PA-C  [address] [place] [address]  I have personally performed a face to face diagnostic evaluation on this patient. I have interviewed and examined the patient and I agree with the findings and recommended plan of care. In summary, my findings and plan are the following: As above, young woman with persisting nausea after laparoscopic ablation of endometriosis. She admits to GE reflux symptoms and cannabis use. On exam abd is soft, some tenderness on incisions, but no guarding or rebound. Discussed biliary disease, PUD, meds, gastroparesis and cannabis induced hyperemesis syndrome as possible causes. We will schedule MRCP to eval for biliary obstruction, start PPI and plan EGD in AM to further eval.  Consider GE scan vs empiric trial of Reglan depending on her progress and results.     Ravi Souza, [address] [place] [address]  6/16/2021

## 2021-06-16 NOTE — FLOWSHEET NOTE
06/16/21 0758   Vital Signs   Temp 97.5 °F (36.4 °C)   Temp Source Oral   Pulse 63   Heart Rate Source Brachial   Resp 16   BP (!) 136/92   BP Location Left upper arm   MAP (mmHg) 106   Patient Position Semi fowlers   Level of Consciousness Alert (0)   MEWS Score 1   Patient Currently in Pain Yes   Pain Assessment   Pain Assessment 0-10   Pain Level 5   Patient's Stated Pain Goal No pain   Pain Type Surgical pain   Pain Location Abdomen   Non-Pharmaceutical Pain Intervention(s) Cold applied   Pain Descriptors Aching   Response to Pain Intervention Patient Satisfied   Oxygen Therapy   SpO2 100 %   Pulse Oximeter Device Mode Intermittent   Pulse Oximeter Device Location Finger   O2 Device None (Room air)     Shift assessment complete. VSS. Pt. Is alert and oriented resting comfortably in bed. Pt. Has complaints of stomach pain and states she vomited x1 last night. Denies eating 100% of dinner meal but noted to order dinner and extra food last night. POC discussed with patient and patient states understanding and is in agreement with plan. Call light and bedside table within reach. No further needs expressed at this time.  Jose Jacinto RN

## 2021-06-17 ENCOUNTER — ANESTHESIA (OUTPATIENT)
Dept: ENDOSCOPY | Age: 26
End: 2021-06-17
Payer: MEDICARE

## 2021-06-17 ENCOUNTER — ANESTHESIA EVENT (OUTPATIENT)
Dept: ENDOSCOPY | Age: 26
End: 2021-06-17
Payer: MEDICARE

## 2021-06-17 VITALS — OXYGEN SATURATION: 100 % | SYSTOLIC BLOOD PRESSURE: 130 MMHG | DIASTOLIC BLOOD PRESSURE: 74 MMHG

## 2021-06-17 LAB — HCG QUALITATIVE: NEGATIVE

## 2021-06-17 PROCEDURE — G0378 HOSPITAL OBSERVATION PER HR: HCPCS

## 2021-06-17 PROCEDURE — 6370000000 HC RX 637 (ALT 250 FOR IP): Performed by: INTERNAL MEDICINE

## 2021-06-17 PROCEDURE — 6360000002 HC RX W HCPCS: Performed by: NURSE ANESTHETIST, CERTIFIED REGISTERED

## 2021-06-17 PROCEDURE — 3700000001 HC ADD 15 MINUTES (ANESTHESIA): Performed by: INTERNAL MEDICINE

## 2021-06-17 PROCEDURE — 6370000000 HC RX 637 (ALT 250 FOR IP): Performed by: OBSTETRICS & GYNECOLOGY

## 2021-06-17 PROCEDURE — 3609017100 HC EGD: Performed by: INTERNAL MEDICINE

## 2021-06-17 PROCEDURE — 3700000000 HC ANESTHESIA ATTENDED CARE: Performed by: INTERNAL MEDICINE

## 2021-06-17 PROCEDURE — 7100000000 HC PACU RECOVERY - FIRST 15 MIN: Performed by: INTERNAL MEDICINE

## 2021-06-17 PROCEDURE — 6360000002 HC RX W HCPCS: Performed by: INTERNAL MEDICINE

## 2021-06-17 PROCEDURE — 2580000003 HC RX 258: Performed by: NURSE ANESTHETIST, CERTIFIED REGISTERED

## 2021-06-17 PROCEDURE — 96376 TX/PRO/DX INJ SAME DRUG ADON: CPT

## 2021-06-17 PROCEDURE — 2709999900 HC NON-CHARGEABLE SUPPLY: Performed by: INTERNAL MEDICINE

## 2021-06-17 PROCEDURE — 7100000001 HC PACU RECOVERY - ADDTL 15 MIN: Performed by: INTERNAL MEDICINE

## 2021-06-17 PROCEDURE — 6360000002 HC RX W HCPCS: Performed by: PHYSICIAN ASSISTANT

## 2021-06-17 PROCEDURE — 2500000003 HC RX 250 WO HCPCS: Performed by: NURSE ANESTHETIST, CERTIFIED REGISTERED

## 2021-06-17 PROCEDURE — 2580000003 HC RX 258: Performed by: OBSTETRICS & GYNECOLOGY

## 2021-06-17 PROCEDURE — C9113 INJ PANTOPRAZOLE SODIUM, VIA: HCPCS | Performed by: PHYSICIAN ASSISTANT

## 2021-06-17 PROCEDURE — 6360000002 HC RX W HCPCS: Performed by: OBSTETRICS & GYNECOLOGY

## 2021-06-17 RX ORDER — ONDANSETRON 2 MG/ML
INJECTION INTRAMUSCULAR; INTRAVENOUS PRN
Status: DISCONTINUED | OUTPATIENT
Start: 2021-06-17 | End: 2021-06-17 | Stop reason: SDUPTHER

## 2021-06-17 RX ORDER — SODIUM CHLORIDE 9 MG/ML
INJECTION, SOLUTION INTRAVENOUS CONTINUOUS
Status: DISCONTINUED | OUTPATIENT
Start: 2021-06-17 | End: 2021-06-17

## 2021-06-17 RX ORDER — LIDOCAINE HYDROCHLORIDE 20 MG/ML
INJECTION, SOLUTION EPIDURAL; INFILTRATION; INTRACAUDAL; PERINEURAL PRN
Status: DISCONTINUED | OUTPATIENT
Start: 2021-06-17 | End: 2021-06-17 | Stop reason: SDUPTHER

## 2021-06-17 RX ORDER — METOCLOPRAMIDE HYDROCHLORIDE 5 MG/ML
10 INJECTION INTRAMUSCULAR; INTRAVENOUS EVERY 6 HOURS
Status: DISCONTINUED | OUTPATIENT
Start: 2021-06-17 | End: 2021-06-18 | Stop reason: HOSPADM

## 2021-06-17 RX ORDER — SODIUM CHLORIDE 9 MG/ML
INJECTION, SOLUTION INTRAVENOUS CONTINUOUS PRN
Status: DISCONTINUED | OUTPATIENT
Start: 2021-06-17 | End: 2021-06-17 | Stop reason: SDUPTHER

## 2021-06-17 RX ORDER — KETAMINE HYDROCHLORIDE 10 MG/ML
INJECTION, SOLUTION INTRAMUSCULAR; INTRAVENOUS PRN
Status: DISCONTINUED | OUTPATIENT
Start: 2021-06-17 | End: 2021-06-17 | Stop reason: SDUPTHER

## 2021-06-17 RX ORDER — PROPOFOL 10 MG/ML
INJECTION, EMULSION INTRAVENOUS PRN
Status: DISCONTINUED | OUTPATIENT
Start: 2021-06-17 | End: 2021-06-17 | Stop reason: SDUPTHER

## 2021-06-17 RX ADMIN — LORAZEPAM 1 MG: 2 INJECTION INTRAMUSCULAR; INTRAVENOUS at 17:24

## 2021-06-17 RX ADMIN — OXYCODONE 10 MG: 5 TABLET ORAL at 21:38

## 2021-06-17 RX ADMIN — LITHIUM CARBONATE 600 MG: 300 CAPSULE, GELATIN COATED ORAL at 11:40

## 2021-06-17 RX ADMIN — KETAMINE HYDROCHLORIDE 30 MG: 10 INJECTION, SOLUTION INTRAMUSCULAR; INTRAVENOUS at 09:50

## 2021-06-17 RX ADMIN — OXYCODONE 10 MG: 5 TABLET ORAL at 17:25

## 2021-06-17 RX ADMIN — METOCLOPRAMIDE 10 MG: 5 INJECTION, SOLUTION INTRAMUSCULAR; INTRAVENOUS at 11:40

## 2021-06-17 RX ADMIN — ONDANSETRON 4 MG: 2 INJECTION INTRAMUSCULAR; INTRAVENOUS at 17:15

## 2021-06-17 RX ADMIN — ONDANSETRON 4 MG: 2 INJECTION INTRAMUSCULAR; INTRAVENOUS at 09:48

## 2021-06-17 RX ADMIN — PROPRANOLOL HYDROCHLORIDE 20 MG: 20 TABLET ORAL at 11:40

## 2021-06-17 RX ADMIN — PROPOFOL 140 MG: 10 INJECTION, EMULSION INTRAVENOUS at 09:48

## 2021-06-17 RX ADMIN — LORAZEPAM 1 MG: 2 INJECTION INTRAMUSCULAR; INTRAVENOUS at 11:58

## 2021-06-17 RX ADMIN — PANTOPRAZOLE SODIUM 40 MG: 40 INJECTION, POWDER, FOR SOLUTION INTRAVENOUS at 08:13

## 2021-06-17 RX ADMIN — LIDOCAINE HYDROCHLORIDE 25 MG: 20 INJECTION, SOLUTION EPIDURAL; INFILTRATION; INTRACAUDAL; PERINEURAL at 09:48

## 2021-06-17 RX ADMIN — SODIUM CHLORIDE, POTASSIUM CHLORIDE, SODIUM LACTATE AND CALCIUM CHLORIDE: 600; 310; 30; 20 INJECTION, SOLUTION INTRAVENOUS at 03:46

## 2021-06-17 RX ADMIN — LITHIUM CARBONATE 600 MG: 300 CAPSULE, GELATIN COATED ORAL at 19:31

## 2021-06-17 RX ADMIN — ONDANSETRON 4 MG: 2 INJECTION INTRAMUSCULAR; INTRAVENOUS at 06:43

## 2021-06-17 RX ADMIN — PROMETHAZINE HYDROCHLORIDE 25 MG: 25 INJECTION INTRAMUSCULAR; INTRAVENOUS at 21:39

## 2021-06-17 RX ADMIN — METOCLOPRAMIDE 10 MG: 5 INJECTION, SOLUTION INTRAMUSCULAR; INTRAVENOUS at 19:30

## 2021-06-17 RX ADMIN — LORAZEPAM 1 MG: 2 INJECTION INTRAMUSCULAR; INTRAVENOUS at 06:50

## 2021-06-17 RX ADMIN — OXYCODONE 10 MG: 5 TABLET ORAL at 06:43

## 2021-06-17 RX ADMIN — OXYCODONE 10 MG: 5 TABLET ORAL at 11:58

## 2021-06-17 RX ADMIN — SODIUM CHLORIDE: 9 INJECTION, SOLUTION INTRAVENOUS at 09:45

## 2021-06-17 RX ADMIN — LORAZEPAM 1 MG: 2 INJECTION INTRAMUSCULAR; INTRAVENOUS at 21:35

## 2021-06-17 ASSESSMENT — PAIN DESCRIPTION - PAIN TYPE: TYPE: SURGICAL PAIN

## 2021-06-17 ASSESSMENT — PAIN DESCRIPTION - LOCATION
LOCATION: ABDOMEN
LOCATION: ABDOMEN

## 2021-06-17 ASSESSMENT — PULMONARY FUNCTION TESTS
PIF_VALUE: 0
PIF_VALUE: 1
PIF_VALUE: 1
PIF_VALUE: 0
PIF_VALUE: 1
PIF_VALUE: 1
PIF_VALUE: 0
PIF_VALUE: 1
PIF_VALUE: 0

## 2021-06-17 ASSESSMENT — PAIN SCALES - GENERAL
PAINLEVEL_OUTOF10: 6
PAINLEVEL_OUTOF10: 0
PAINLEVEL_OUTOF10: 0
PAINLEVEL_OUTOF10: 7
PAINLEVEL_OUTOF10: 6
PAINLEVEL_OUTOF10: 7
PAINLEVEL_OUTOF10: 6
PAINLEVEL_OUTOF10: 7
PAINLEVEL_OUTOF10: 7

## 2021-06-17 ASSESSMENT — PAIN DESCRIPTION - ORIENTATION: ORIENTATION: MID;LOWER

## 2021-06-17 ASSESSMENT — PAIN DESCRIPTION - ONSET: ONSET: ON-GOING

## 2021-06-17 ASSESSMENT — PAIN DESCRIPTION - FREQUENCY: FREQUENCY: CONTINUOUS

## 2021-06-17 ASSESSMENT — PAIN DESCRIPTION - DESCRIPTORS: DESCRIPTORS: SORE;ACHING

## 2021-06-17 ASSESSMENT — LIFESTYLE VARIABLES: SMOKING_STATUS: 1

## 2021-06-17 ASSESSMENT — PAIN DESCRIPTION - PROGRESSION: CLINICAL_PROGRESSION: NOT CHANGED

## 2021-06-17 ASSESSMENT — ENCOUNTER SYMPTOMS: SHORTNESS OF BREATH: 0

## 2021-06-17 NOTE — PROGRESS NOTES
Scope verified using 2 person system. Reviewed problem list, assessment, H&P, and checklist preoperatively.

## 2021-06-17 NOTE — BRIEF OP NOTE
Brief Postoperative Note - Full Note in Chart Review/Procedures tab       Patient: Saroj Gunderson  YOB: 1995  MRN: [id number]    Date of Procedure: 6/17/2021    Pre-Op Diagnosis: Nausea/Vomiting/ Abdominal Pain    Post-Op Diagnosis: Same       Procedure(s):  EGD DIAGNOSTIC ONLY    Surgeon(s):  Tiffanie Resendez MD    Assistant:  * No surgical staff found *    Anesthesia: Monitor Anesthesia Care    Estimated Blood Loss (mL): Minimal    Complications: None    Specimens:   * No specimens in log *    Implants:  * No implants in log *      Drains:   [REMOVED] Urethral Catheter Non-latex 16 fr (Removed)       Findings:  Retained food in stomach    Otherwise normal EGD    Rec:  Soft diet - advance as symptoms allow. Pantoprazole 40 mg daily  Reglan 10 mg IV 30 minutes before each meal and at bedtime (QID) - change to po when better. MRCP   Follow up as inpatient  Consider discharge if MRCP (-) and symptoms controlled on meds.     Electronically signed by Tiffanie Resendez MD on 6/17/2021 at 10:04 AM

## 2021-06-17 NOTE — PROGRESS NOTES
Pt requested not to be woken up during the night because she \"needs her sleep\". Pt states she is refusing 0000 & 0400 vitals. Instructed pt to call out with any needs. Pt v/u.

## 2021-06-17 NOTE — ANESTHESIA POSTPROCEDURE EVALUATION
Department of Anesthesiology  Postprocedure Note    Patient: Mark Mckay  MRN: [id number]  YOB: 1995  Date of evaluation: 6/17/2021  Time:  10:21 AM     Procedure Summary     Date: 06/17/21 Room / Location: [address] [place] [address]    Anesthesia Start: [address] Anesthesia Stop: 6126    Procedure: EGD DIAGNOSTIC ONLY (N/A Abdomen) Diagnosis: (Nausea/Vomiting/ Abdominal Pain)    Surgeons: Remington Mahmood MD Responsible Provider: Maria G Avila MD    Anesthesia Type: MAC ASA Status: 2          Anesthesia Type: MAC    Preston Phase I: Preston Score: 9    Preston Phase II:      Last vitals: Reviewed and per EMR flowsheets.        Anesthesia Post Evaluation    Patient location during evaluation: PACU  Level of consciousness: awake  Airway patency: patent  Complications: no  Cardiovascular status: hemodynamically stable  Respiratory status: acceptable

## 2021-06-17 NOTE — ANESTHESIA PRE PROCEDURE
tablet 1 tablet  1 tablet Oral Q4H PRN Calvin Clay MD   1 tablet at 06/15/21 1717    docusate sodium (COLACE) capsule 100 mg  100 mg Oral Daily PRN Calvin Clay MD        propranolol (INDERAL) tablet 20 mg  20 mg Oral Daily Calvin Clay MD   20 mg at 21 1137    amphetamine-dextroamphetamine (ADDERALL XR) extended release capsule 10 mg  10 mg Oral BID Calvin Clay MD        ondansetron (ZOFRAN-ODT) disintegrating tablet 4 mg  4 mg Oral Q8H PRN Calvin Clay MD        Or    ondansetron [facility]) injection 4 mg  4 mg Intravenous Q6H PRN Calvin Clay MD   4 mg at 21 0976    acetaminophen (TYLENOL) tablet 650 mg  650 mg Oral Q4H PRN Calvin Clay MD        lactated ringers infusion   Intravenous Continuous Calvin Clay  mL/hr at 21 0346 New Bag at 21 0346    oxyCODONE (ROXICODONE) immediate release tablet 5 mg  5 mg Oral Q4H PRN Calvin Clay MD        Or   Zack Jason oxyCODONE (ROXICODONE) immediate release tablet 10 mg  10 mg Oral Q4H PRN Calvin Clay MD   10 mg at 21 5590    lithium capsule 600 mg  600 mg Oral BID WC Calvin Clay MD   600 mg at 21 2038    clonazePAM (KLONOPIN) tablet 2 mg  2 mg Oral TID PRN Calvin Clay MD   2 mg at 06/15/21 2214    promethazine (PHENERGAN) injection 25 mg  25 mg Intramuscular Q6H PRN Calvin Clay MD   25 mg at 21 195       Allergies: Allergies   Allergen Reactions    Etonogestrel      Other reaction(s):  Other (See Comments)  Per [facility] ([facility] may have contributed to PE in 2017    Toradol [Ketorolac Tromethamine] Itching    Morphine Itching, Rash and Hives    Naprosyn [Naproxen] Hives, Swelling and Rash       Problem List:    Patient Active Problem List   Diagnosis Code    History of  delivery Z98.891    Menorrhagia with irregular cycle N92.1    Pulmonary embolism without acute cor pulmonale (HCC) I26.99    Cannabis use disorder, moderate, hypertension:,       ECG reviewed  Rhythm: regular  Rate: normal                    Neuro/Psych:   Negative Neuro/Psych ROS  (+) headaches:, psychiatric history: stable with treatmentdepression/anxiety             GI/Hepatic/Renal: Neg GI/Hepatic/Renal ROS       (-) GERD and PUD       Endo/Other: Negative Endo/Other ROS       (-) diabetes mellitus, hypothyroidism               Abdominal:           Vascular:                                        Anesthesia Plan      MAC     ASA 2       Induction: intravenous. Anesthetic plan and risks discussed with patient. Plan discussed with CRNA.                 Yady Clark MD   6/17/2021

## 2021-06-17 NOTE — PLAN OF CARE
Problem: Pain:  Goal: Pain level will decrease  Description: Pain level will decrease  Outcome: Ongoing     Problem:  Bowel/Gastric:  Goal: Diagnostic test results will improve  Description: Diagnostic test results will improve  Outcome: Ongoing     Problem: Fluid Volume:  Goal: Maintenance of adequate hydration will improve  Description: Maintenance of adequate hydration will improve  Outcome: Ongoing     Problem: Nausea/Vomiting:  Goal: Absence of nausea/vomiting  Description: Absence of nausea/vomiting  Outcome: Ongoing

## 2021-06-17 NOTE — PROGRESS NOTES
HD#3    S/P EGD. Feels a little better. Has had some episodes of emesis.   Pain has improved along right side    Vitals:    06/17/21 1001 06/17/21 1006 06/17/21 1010 06/17/21 1145   BP: 116/80 118/79 126/82 121/77   Pulse: 80 79 80 76   Resp: 16 16 17 16   Temp: 97.7 °F (36.5 °C)   98 °F (36.7 °C)   TempSrc: Temporal   Oral   SpO2: 99% 100% 100% 99%   Weight:       Height:           cv RRR  Pulm CTA  Abd soft nontender        Intake/Output Summary (Last 24 hours) at 6/17/2021 1206  Last data filed at 6/17/2021 0957  Gross per 24 hour   Intake 400 ml   Output --   Net 400 ml           S/P EGD and will have MRCP later today or early tomorrow    Will use reglan/pepcid as planned per GI

## 2021-06-18 ENCOUNTER — APPOINTMENT (OUTPATIENT)
Dept: MRI IMAGING | Age: 26
End: 2021-06-18
Attending: OBSTETRICS & GYNECOLOGY
Payer: MEDICARE

## 2021-06-18 VITALS
BODY MASS INDEX: 27.04 KG/M2 | HEART RATE: 87 BPM | WEIGHT: 162.3 LBS | OXYGEN SATURATION: 100 % | RESPIRATION RATE: 16 BRPM | SYSTOLIC BLOOD PRESSURE: 130 MMHG | HEIGHT: 65 IN | DIASTOLIC BLOOD PRESSURE: 86 MMHG | TEMPERATURE: 97.6 F

## 2021-06-18 PROCEDURE — G0378 HOSPITAL OBSERVATION PER HR: HCPCS

## 2021-06-18 PROCEDURE — 2580000003 HC RX 258: Performed by: INTERNAL MEDICINE

## 2021-06-18 PROCEDURE — 6360000002 HC RX W HCPCS: Performed by: INTERNAL MEDICINE

## 2021-06-18 PROCEDURE — 96372 THER/PROPH/DIAG INJ SC/IM: CPT

## 2021-06-18 PROCEDURE — 74181 MRI ABDOMEN W/O CONTRAST: CPT

## 2021-06-18 PROCEDURE — 96375 TX/PRO/DX INJ NEW DRUG ADDON: CPT

## 2021-06-18 PROCEDURE — 6370000000 HC RX 637 (ALT 250 FOR IP): Performed by: INTERNAL MEDICINE

## 2021-06-18 PROCEDURE — 96376 TX/PRO/DX INJ SAME DRUG ADON: CPT

## 2021-06-18 PROCEDURE — C9113 INJ PANTOPRAZOLE SODIUM, VIA: HCPCS | Performed by: INTERNAL MEDICINE

## 2021-06-18 PROCEDURE — 6360000002 HC RX W HCPCS: Performed by: OBSTETRICS & GYNECOLOGY

## 2021-06-18 RX ORDER — PANTOPRAZOLE SODIUM 20 MG/1
20 TABLET, DELAYED RELEASE ORAL DAILY
Qty: 30 TABLET | Refills: 3 | Status: SHIPPED | OUTPATIENT
Start: 2021-06-18 | End: 2021-07-18

## 2021-06-18 RX ORDER — ONDANSETRON 4 MG/1
4 TABLET, ORALLY DISINTEGRATING ORAL EVERY 8 HOURS PRN
Qty: 20 TABLET | Refills: 0 | Status: SHIPPED | OUTPATIENT
Start: 2021-06-18 | End: 2021-06-25

## 2021-06-18 RX ORDER — METOCLOPRAMIDE 10 MG/1
10 TABLET ORAL
Qty: 120 TABLET | Refills: 3 | Status: SHIPPED | OUTPATIENT
Start: 2021-06-18 | End: 2021-07-18

## 2021-06-18 RX ORDER — OXYCODONE HYDROCHLORIDE AND ACETAMINOPHEN 5; 325 MG/1; MG/1
1 TABLET ORAL EVERY 6 HOURS PRN
Qty: 15 TABLET | Refills: 0 | Status: SHIPPED | OUTPATIENT
Start: 2021-06-18 | End: 2021-06-25

## 2021-06-18 RX ADMIN — LITHIUM CARBONATE 600 MG: 300 CAPSULE, GELATIN COATED ORAL at 09:48

## 2021-06-18 RX ADMIN — PROPRANOLOL HYDROCHLORIDE 20 MG: 20 TABLET ORAL at 09:49

## 2021-06-18 RX ADMIN — ENOXAPARIN SODIUM 40 MG: 40 INJECTION SUBCUTANEOUS at 09:47

## 2021-06-18 RX ADMIN — METOCLOPRAMIDE 10 MG: 5 INJECTION, SOLUTION INTRAMUSCULAR; INTRAVENOUS at 11:31

## 2021-06-18 RX ADMIN — METOCLOPRAMIDE 10 MG: 5 INJECTION, SOLUTION INTRAMUSCULAR; INTRAVENOUS at 05:25

## 2021-06-18 RX ADMIN — SODIUM CHLORIDE, POTASSIUM CHLORIDE, SODIUM LACTATE AND CALCIUM CHLORIDE: 600; 310; 30; 20 INJECTION, SOLUTION INTRAVENOUS at 00:11

## 2021-06-18 RX ADMIN — METOCLOPRAMIDE 10 MG: 5 INJECTION, SOLUTION INTRAMUSCULAR; INTRAVENOUS at 00:12

## 2021-06-18 RX ADMIN — PANTOPRAZOLE SODIUM 40 MG: 40 INJECTION, POWDER, FOR SOLUTION INTRAVENOUS at 09:48

## 2021-06-18 RX ADMIN — LORAZEPAM 1 MG: 2 INJECTION INTRAMUSCULAR; INTRAVENOUS at 06:39

## 2021-06-18 RX ADMIN — OXYCODONE 10 MG: 5 TABLET ORAL at 09:49

## 2021-06-18 ASSESSMENT — PAIN SCALES - GENERAL: PAINLEVEL_OUTOF10: 9

## 2021-06-18 NOTE — PROGRESS NOTES
HD#4    Patient with mild nausea but no emesis today. Has tolerated some PO. Continued milf right side pain following surgery. No vaginal bleeding, no CP/SOB/Fever/Chills    Vitals:    06/17/21 2131 06/18/21 0000 06/18/21 0400 06/18/21 0925   BP: 110/72 116/74 106/69 130/86   Pulse: 83 80 76 87   Resp: 16 16 16 16   Temp: 98.7 °F (37.1 °C) 98.2 °F (36.8 °C) 98.2 °F (36.8 °C) 97.6 °F (36.4 °C)   TempSrc: Oral Oral Oral Oral   SpO2: 100% 96% 96% 100%   Weight:       Height:           No intake or output data in the 24 hours ending 06/18/21 1152      CV RRR  Pulm CTA  Abd soft, incisions healing well    Normal MRCP today    Lab Results   Component Value Date    WBC 7.3 06/16/2021    HGB 12.8 06/16/2021    HCT 37.1 06/16/2021    MCV 92.2 06/16/2021     06/16/2021           Normal upper endoscopy and MRCP today. Has had persistent n/v since surgery that has improved. Will plan for discharge home on reglan and pepcid. Follow up in one week.

## 2021-06-18 NOTE — DISCHARGE SUMMARY
Physician Discharge Summary     Patient ID:  Drake Jacobson  [id number]  66 y.o.  1995    Admit date: 6/15/2021    Discharge date:  6/18    Admitting Physician: Rea Swanson MD    Discharge Diagnoses: Post-operative nausea and vomiting [R11.2, Z98.890]    Discharged Condition: STABLE    Procedures Performed: EGD, MRCP, Ct scan    Hospital Course: Patient was admitted for persistent n/v.  Had normal CT scan with dilation of bile duct. Had EGD done and then MRCP which were normal. Some evidence of delayed gastric emptying. Symptoms improved but not resolved    Discharge Exam:  Appears well, AVSS, abdomen soft, appropriately tender, nondistended, BS present, incisions clean dry, intact    Disposition: Good    Patient Instructions: Activity: ambulate in house, no lifting or strenuous exercise for 1 week. Follow up with Dr Vargas Estrada in one week. Follow up with Dr Arvind Estrada in two months    Diet: Regular    Wound Care: Keep wound clean and dry    Discharge Medication:    Francois Steele   Home Medication Instructions ADM:[id number]    Printed on:06/18/21 120   Medication Information                      acetaminophen (TYLENOL) 500 MG tablet  Take 1 tablet by mouth 4 times daily as needed for Pain             amphetamine-dextroamphetamine (ADDERALL XR) 10 MG extended release capsule  Take 10 mg by mouth 2 times daily. clonazePAM (KLONOPIN) 2 MG tablet  Take 2 mg by mouth 3 times daily as needed.               dicyclomine (BENTYL) 10 MG capsule  Take 1 capsule by mouth every 6 hours as needed (cramps)             docusate sodium (COLACE) 100 MG capsule  Take 1 capsule by mouth daily as needed for Constipation             lithium 300 MG capsule  Take 1 capsule by mouth 4 times daily              metoclopramide (REGLAN) 10 MG tablet  Take 1 tablet by mouth 3 times daily (with meals)             ondansetron (ZOFRAN-ODT) 4 MG disintegrating tablet  Take 1 tablet by mouth every 8 hours as needed for Nausea or Vomiting             oxyCODONE-acetaminophen (PERCOCET) 5-325 MG per tablet  Take 1 tablet by mouth every 6 hours as needed for Pain for up to 7 days. Intended supply: 3 days.  Take lowest dose possible to manage pain             pantoprazole (PROTONIX) 20 MG tablet  Take 1 tablet by mouth daily             propranolol (INDERAL) 20 MG tablet  Take 20 mg by mouth daily                   Follow-up with Aileen Jacob MD in [facility]    Signed:  Aileen Jacob MD  6/18/2021  12:04 PM

## 2021-07-07 ENCOUNTER — HOSPITAL ENCOUNTER (OUTPATIENT)
Dept: PHYSICAL THERAPY | Age: 26
Setting detail: THERAPIES SERIES
Discharge: HOME | End: 2021-07-07
Payer: MEDICARE

## 2021-07-07 PROCEDURE — 97140 MANUAL THERAPY 1/> REGIONS: CPT

## 2021-07-07 PROCEDURE — 97161 PT EVAL LOW COMPLEX 20 MIN: CPT

## 2021-07-07 PROCEDURE — 97112 NEUROMUSCULAR REEDUCATION: CPT

## 2021-07-07 PROCEDURE — 97110 THERAPEUTIC EXERCISES: CPT

## 2021-07-07 NOTE — PLAN OF CARE
[address] [address]  Phone: [phone]   Fax: [phone]                                                      Physical Therapy Certification    Dear Referring Practitioner: Dr. Jen Bardales,    We had the pleasure of evaluating the following patient for physical therapy services at [address] [place] [address]. A summary of our findings can be found in the initial assessment below. This includes our plan of care. If you have any questions or concerns regarding these findings, please do not hesitate to contact me at the office phone number checked above. Thank you for the referral.       Physician Signature:_______________________________Date:__________________  By signing above (or electronic signature), therapist's plan is approved by physician      Patient: Cristiane Kwok   : 1995   MRN: [id number]  Referring Physician: Referring Practitioner: Dr. Jen Bardales      Evaluation Date: 2021      Medical Diagnosis Information:  Diagnosis: Pelvic Pain                                             Insurance information: PT Insurance Information: [employer], 100% for 30 visits of PT/year    Second person requested for examination:  [x] No    [] Yes   If yes, who was present:    Precautions/ Contra-indications:     Latex Allergy:  [x]NO      []YES    Preferred Language for Healthcare:   [x]English       []Other:    C-SSRS Triggered by Intake questionnaire (Past 2 wk assessment ):   [x] No, Questionnaire did not trigger screening.   [] Yes, Patient intake triggered C-SSRS Screening     [] Completed, no further action required. [] Completed, PCP notified via Epic    Functional Outcome:  LEFS: 40/80       SUBJECTIVE: Patient stated complaint:patient into clinic reporting deconditioning and weakness following her tubal ligation and surgery for endometriosis on 21.   She developed an infection in her surgical sites following this surgery and it required her to stay in the hospital for a week per her report. Since then, she has had continued pain in her lower abdominal region and notices that she struggles to perform her work and home duties (including ) due to weakness. Says that she has to sit down to descend steps and has to sit throughout her work shift in her order to tolerate her work day as a . She is also noticing difficulty carrying the trays. She is sexually active and intercourse is pain free at this point. She denies any problems with urinary or fecal incontinence. Pregnancies: [] No    [x] Yes, if yes # 3, 2 live births  Deliveries: # and type: 2 via      4 week or greater of failed trial of PFPT program?   [x] No    [] Yes    PFPT program as defined by \"Completing 4 weeks of an ordered plan of pelvic muscle exercises designed to increase periurethral muscle strength\".     Relevant Medical History:anxiety, HTN, C-sections in  and 2016, gall bladder removal, tubal ligation    Pain Scale: 4/10  Easing factors: rest  Provocative factors: more active pursuits like walking and standing for longer periods of time, chasing her children, working as a   Type: [x]Constant   []Intermittent  []Radiating []Localized []other:     Numbness/Tingling: denies    Occupation/School:  at [employer] Status/Prior Level of Function: Prior to this injury / incident, pt was independent with ADLs and IADLs, mother of 2 children ages 8 and 11    OBJECTIVE:  Ortho Screen:  Posture: increased lordotic curve  Other Observation:   Palpation: tender bilateral ASIS and PSIS; tender over scope sites from previous pelvic surgery with minimal adhesions noted  Alignment: R post rot    ROM LEFT RIGHT Comments   Cervical Side Bend [facility]    Cervical Rotation [facility]    Shoulder Flex [facility]    Shoulder Abd [facility]    Shoulder ER [facility]    [facility] Lumbar Side Bend [facility]    Lumbar Rotation [facility] WFL    Hip Flexion [facility] WFL    Hip Abd [facility]    Hip ER [facility]    Hip IR [facility] WFL    Hip Extension [facility] WFL    Knee Ext [facility] WFL    Knee Flex [facility] WFL          Hamstring Flex W[facility] WFL    Piriformis [facility] WFL                    Strength LEFT RIGHT Comments   Shoulder flexion 4 4    Shoulder scaption 4 4    Shoulder ER 4 4    Shoulder IR 4 4    Biceps 4 4    Triceps 4 4                Multifidus 4 4    Transverse Ab 4 4    Hip Flexors 4 4    Hip Abductors 4 4    Hip Extensors 4 4    Hip Internal Rotators 4 4    Hip External Rotators 4 4      TA Muscle Contraction Scale    Criteria                                               Score  Quality of Contraction   Not Present      [] 0   Rapid, Superificial                [x] 1   Just Perceptible                [] 2     Gentle, Slow                [] 3    Substitution   Resting                  [] 0   Moderate to Strong                          [x] 1    Subtle Perceptible                [] 2   None                 [] 3    Symmetry of Contraction   Unilateral                  [] 0   Bilateral/Asymmetrical                [x] 1   Symmetrical                 [] 2    Breathing     Inability/Difficulty Breathing during contraction               [x] 0   Able to hold contraction while Breathing             [] 1    Holding   Able to Hold Contraction <10 s                         [x] 0   Able to Hold Contraction >10 s               [] 1      _3_/10  Adapted from Ulysses Hernandez al, Copyright 2009      Abdominals:  Scarring:   [] No    [x] Yes  Diastasis:   [] No    [x] Yes  Functional stability:   [x] No    [] Yes    Pelvic Floor: internal assessment not performed this date due to no pain complaints and no complaints that indicate significant pelvic floor weakness; will incorporate PFM contractions into treatment plan                         [x] Patient history, allergies, meds reviewed. Medical chart reviewed. See intake form.      Review Of Systems (ROS):  [x]Performed Review of systems (Integumentary, CardioPulmonary, Neurological) by intake and observation. Intake form has been scanned into medical record. Patient has been instructed to contact their primary care physician regarding ROS issues if not already being addressed at this time.       Co-morbidities/Complexities (which will affect course of rehabilitation):   []None        []Hx of COVID   Arthritic conditions   []Rheumatoid arthritis (M05.9)  []Osteoarthritis (M19.91)  []Gout   Cardiovascular conditions   [x]Hypertension (I10)  []Hyperlipidemia (E78.5)  []Angina pectoris (I20)  []Atherosclerosis (I70)  []Pacemaker  []Hx of CABG/stent/  cardiac surgeries   Musculoskeletal conditions   []Disc pathology   []Congenital spine pathologies   []Osteoporosis (M81.8)  []Osteopenia (M85.8)  []Scoliosis       Endocrine conditions   []Hypothyroid (E03.9)  []Hyperthyroid Gastrointestinal conditions   []Constipation (N73.66)   Metabolic conditions   []Morbid obesity (E66.01)  []Diabetes type 1(E10.65) or 2 (E11.65)   []Neuropathy (G60.9)     Cardio/Pulmonary conditions   []Asthma (J45)  []Coughing   []COPD (J44.9)  []CHF  []A-fib   Psychological Disorders  [x]Anxiety (F41.9)  []Depression (F32.9)   []Other:   Developmental Disorders  []Autism (F84.0)  []CP (G80)  []Down Syndrome (Q90.9)  []Developmental delay     Neurological conditions  []Prior Stroke (I69.30)  []Parkinson's (G20)  []Encephalopathy (G93.40)  []MS (G35)  []Post-polio (G14)  []SCI  []TBI  []ALS Other conditions  []Fibromyalgia (M79.7)  []Vertigo  []Syncope  []Kidney Failure  []Cancer      []currently undergoing                treatment  []Pregnancy  []Incontinence   Prior surgeries  []involved limb  []previous spinal surgery  [x] section birth  []hysterectomy  []bowel / bladder surgery  [x]other relevant surgeries: tubal ligation   []Other:              Barriers to/and or personal factors that will affect rehab potential: []Age  []Sex   [x]Smoker              []Motivation/Lack of Motivation                        [x]Co-Morbidities              []Cognitive Function, education/learning barriers              []Environmental, home barriers              []profession/work barriers  []past PT/medical experience  []other:  Justification:     Falls Risk Assessment (30 days):   [x] Falls Risk assessed and no intervention required.   [] Falls Risk assessed and Patient requires intervention due to being higher risk   TUG score (>12s at risk):     [] Falls education provided, including         ASSESSMENT: Presents with core weakness and pelvic asymmetry contributing to continued lower abdominal pain and generalized deconditioning   Functional Impairments:    []Noted lumbar/proximal hip hypomobility  []Noted lumbosacral and/or generalized hypermobility  [x]Decreased core/proximal hip strength and neuromuscular control   [x]Decreased LE functional strength  [x]pelvic/sacral/spinal malalignment   []Increased pain with penetration  []Absent/poor control of PF contraction   []Absent/poor control of PF relaxation  []Decreased control of bladder  []Decreased control of bowel    Functional Activity Limitations (from functional questionnaire and intake)  [x]Reduced ability to maintain good posture and demonstrate good body mechanics with sitting, bending, and lifting  [x]Reduced ability to perform lifting, reaching, carrying tasks  []Reduced ability to control urine  []Reduced ability to control bowel movements   [x]Reduced ability to ambulate prolonged functional periods/distances/surfaces  [x]Reduced ability to squat   []Reduced ability to forward bend  [x]Reduced ability to ascend/descend stairs  []Reduced ability to tolerate penetration/intercourse    Participation Restrictions  [x]Reduced participation in self care activities  [x]Reduced participation in home management activities  [x]Reduced participation in work activities  [x]Reduced participation in social activities  [x]Reduced participation in sport/recreational activities    Classification/Subgrouping:  []signs/symptoms consistent vaginismus/dyspareunia    []signs/symptoms consistent with pelvic floor organ prolapse  []signs/symptoms consistent with stress urinary incontinence  []signs/symptoms consistent with urge urinary incontinence  []signs/symptoms consistent with bowel incontinence  [x]signs/symptoms consistent with post-surgical status including decreased ROM, strength and function  []signs/symptoms consistent with other:       Prognosis/Rehab Potential:      [x]Excellent   []Good    []Fair   []Poor    Tolerance of evaluation/treatment:    [x]Excellent   []Good    []Fair   []Poor    Physical Therapy Evaluation Complexity Justification  [x] A history of present problem with:  [] no personal factors and/or comorbidities that impact the plan of care;  [x]1-2 personal factors and/or comorbidities that impact the plan of care  []3 personal factors and/or comorbidities that impact the plan of care  [x] An examination of body systems using standardized tests and measures addressing any of the following: body structures and functions (impairments), activity limitations, and/or participation restrictions;:  [x] a total of 1-2 or more elements   [] a total of 3 or more elements   [] a total of 4 or more elements   [x] A clinical presentation with:  [x] stable and/or uncomplicated characteristics   [] evolving clinical presentation with changing characteristics  [] unstable and unpredictable characteristics;   [x] Clinical decision making of [x] low, [] moderate, [] high complexity using standardized patient assessment instrument and/or measurable assessment of functional outcome.     [x] EVAL (LOW) 42481 (typically 20 minutes face-to-face)  [] EVAL (MOD) 31778 (typically 30 minutes face-to-face)  [] EVAL (HIGH) 12630 (typically 45 minutes face-to-face)  [] RE-EVAL       PLAN:   Frequency/Duration:  1 days per week for 6 Weeks:  Interventions:  [x]  Therapeutic exercise including: strength training, ROM, and functional mobility for joint, spine, core, and pelvic floor   [x]  NMR activation and proprioception for abdominals, pelvic floor musculature activation and coordination, and posture retraining   [x]  Manual therapy as indicated for spine, ribs, soft tissue, and pelvic floor to include: IASTM with or without dilator, STM, PROM, Gr I-IV mobilizations   [x] Modalities as needed that may include: E-stim, Biofeedback as indicated  [x] Patient education on pelvic floor anatomy and function, bladder and bowel anatomy and function, joint protection, postural re-education, activity modification, progression of HEP. HEP instruction: Written HEP instructions provided and reviewed    GOALS:  Patient stated goal: \"get stronger\"  [] Progressing: [] Met: [] Not Met: [] Adjusted      Therapist goals for Patient:   Short Term Goals: To be achieved in: 2 weeks  1. Independent in HEP and progression per patient tolerance, in order to prevent future occurrence of presenting issue. [] Progressing: [] Met: [] Not Met: [] Adjusted      Long Term Goals: To be achieved in: 6 weeks  1. Disability index score of 15% or less on the LEFS to assist with reaching prior level of function. [] Progressing: [] Met: [] Not Met: [] Adjusted  2. Patient will return to functional activities including ascending and descending steps, squatting, walking longer distances without increased symptoms or restriction.    [] Progressing: [] Met: [] Not Met: [] Adjusted       Electronically signed by:  Fina Hicks PT

## 2021-07-07 NOTE — FLOWSHEET NOTE
[facility] Physical Therapy  Phone: [phone]   Fax: [phone]    Physical Therapy Daily Treatment Note  Date:  2021    Patient Name:  Blima Dubin    :  1995  MRN: [id number]  Medical/Treatment Diagnosis Information:  Diagnosis: Pelvic Pain   deconditioning; core weakness with pelvic asymmetry and pain  Insurance/Certification information:  PT Insurance Information: [employer], 100% for 30 visits of PT/year  Physician Information:  Referring Practitioner: Dr. Konrad Veloz  Plan of care signed (Y/N): []  Yes [x]  No     Date of Patient follow up with Physician:      Progress Report: [x]  Yes  []  No     Date Range for reporting period:  Beginnin21  Endin21    Progress report due (10 Rx/or 30 days whichever is less): visit #43 or     Recertification due (POC duration/ or 90 days whichever is less): visit #    Visit # Insurance Allowable Auth required?  Date Range    30 []  Yes  [x]  No          Latex Allergy:  [x]NO      []YES  Preferred Language for Healthcare:   [x]English       []other:    Functional Scale:          LEFS: 40/80 on                                                                                  Pain level:  4/10     SUBJECTIVE:  See eval    OBJECTIVE: See eval      RESTRICTIONS/PRECAUTIONS: anxiety    Exercises/Interventions:     Therapeutic Exercises (65511) Resistance / level Sets/sec Reps Notes   bike       Cables--mid rows  High rows  Walkouts with palloff       SLS       Leg press/TG                                          Therapeutic Activities (38573)                                          Neuromuscular Re-ed (84497)       Pelvic floor mm contractions                                          Manual Intervention (12862)                                                     Modalities:     Pt. Education:  2021: patient educated on diagnosis, prognosis and expectations for rehab, all patient questions were answered    Home Exercise Program:    Access Code: [email or web address]  URL: [email or web address]  Date: 07/07/2021  Prepared by: Leopoldo Barns    Exercises  Supine Bridge - 1 x daily - 7 x weekly - 3 sets - 10 reps  Supine Hip Adduction Isometric with Ball - 1 x daily - 7 x weekly - 3 sets - 10 reps  Supine Transversus Abdominis Bracing - Hands on Stomach - 1 x daily - 7 x weekly - 3 sets - 10 reps  Hooklying Isometric Clamshell - 1 x daily - 7 x weekly - 3 sets - 10 reps  Active Straight Leg Raise with Quad Set - 1 x daily - 7 x weekly - 3 sets - 10 reps  Sidelying Hip Abduction - 1 x daily - 7 x weekly - 3 sets - 10 reps        Therapeutic Exercise and NMR EXR  [x] (99291) Provided verbal/tactile cueing for activities related to strengthening, flexibility, endurance, ROM for improvements in  [x] PF/ LE / Lumbar: LE, proximal hip, and core control with self care, mobility, lifting, ambulation. [] UE / Cervical: cervical, postural, scapular, scapulothoracic and UE control with self care, reaching, carrying, lifting, house/yardwork, driving, computer work.  [] (38423) Provided verbal/tactile cueing for activities related to improving balance, coordination, kinesthetic sense, posture, motor skill, proprioception to assist with   [] PF / LE / lumbar: LE, proximal hip, and core control in self care, mobility, lifting, ambulation and eccentric single leg control. [] UE / cervical: cervical, scapular, scapulothoracic and UE control with self care, reaching, carrying, lifting, house/yardwork, driving, computer work.   [] (81526) Therapist is in constant attendance of 2 or more patients providing skilled therapy interventions, but not providing any significant amount of measurable one-on-one time to either patient, for improvements in  [] PF / LE / lumbar: LE, proximal hip, and core control in self care, mobility, lifting, ambulation and eccentric single leg control.    [] UE / cervical: cervical, scapular, longer distances without increased symptoms or restriction. [] Progressing: [] Met: [] Not Met: [] Adjusted      Overall Progression Towards Functional goals/ Treatment Progress Update:  [] Patient is progressing as expected towards functional goals listed. [] Progression is slowed due to complexities/Impairments listed. [] Progression has been slowed due to co-morbidities. [x] Plan just implemented, too soon to assess goals progression <30days   [] Goals require adjustment due to lack of progress  [] Patient is not progressing as expected and requires additional follow up with physician  [] Other    Persisting Functional Limitations/Impairments:  []Sleeping []Sitting               [x]Standing []Transfers        [x]Walking []Kneeling               [x]Stairs [x]Squatting / bending   [x]ADLs []Reaching  [x]Lifting  [x]Housework  []Driving [x]Job related tasks  []Sports/Recreation [x]Other:        ASSESSMENT:  Core weakness and pelvic asymmetry contributing to continued lower abdominal pain and generalized deconditioning following recent pelvic surgery. Treatment/Activity Tolerance:  [x] Patient able to complete tx [] Patient limited by fatigue  [] Patient limited by pain  [] Patient limited by other medical complications  [] Other:     Prognosis: [x] Good [] Fair  [] Poor    Patient Requires Follow-up: [x] Yes  [] No    Plan for next treatment session:    PLAN: See eval. PT 1x / week for 6 weeks. [] Continue per plan of care [] Alter current plan (see comments)  [x] Plan of care initiated [] Hold pending MD visit [] Discharge    Electronically signed by: Iesha Cos, PT PT, DPT    Note: If patient does not return for scheduled/ recommended follow up visits, this note will serve as a discharge from care along with most recent update on progress. Brionna Dixon)  Plastic Surgery  [address] [place] [address], [address]  [place], [place] [id number]  Phone: [phone]  Fax: [phone]  Follow Up Time:     Cirilo Man; MBBS)  Infectious Disease; Internal Medicine  [address] [place] [address]  Phone: [phone]  Fax: [phone]  Follow Up Time: Brionna Dixon)  Plastic Surgery  [address] [facility], [address]  [place], [place] [id number]  Phone: [phone]  Fax: [phone]  Follow Up Time:     Cirilo Mna; MBBS)  Infectious Disease; Internal Medicine  [address] [place] [address]  Phone: [phone]  Fax: [phone]  Follow Up Time:     Albaro Zacarias  Phone: [phone]  Fax: (   )    -  Follow Up Time:

## 2021-07-14 ENCOUNTER — HOSPITAL ENCOUNTER (OUTPATIENT)
Dept: PHYSICAL THERAPY | Age: 26
Setting detail: THERAPIES SERIES
Discharge: HOME | End: 2021-07-14
Payer: MEDICARE

## 2021-07-14 NOTE — FLOWSHEET NOTE
Physical Therapy  Cancellation/No-show Note  Patient Name:  Rebeca Collins  :  1995   Date:  2021  Cancelled visits to date: 1  No-shows to date: 0    Patient status for today's appointment patient:  [x]  Cancelled   []  Rescheduled appointment  []  No-show     Reason given by patient:  [x]  Patient ill      has a fever  []  Conflicting appointment  []  No transportation    []  Conflict with work  []  No reason given  []  Other:     Comments:      Phone call information:   []  Phone call made today to patient at _ time at number provided:      []  Patient answered, conversation as follows:    []  Patient did not answer, message left as follows:  []  Phone call not made today  [x]  Phone call not needed - pt contacted us to cancel and provided reason for cancellation.      Electronically signed by:  Maciej Aguayo PT

## 2021-07-20 ENCOUNTER — HOSPITAL ENCOUNTER (OUTPATIENT)
Dept: PHYSICAL THERAPY | Age: 26
Setting detail: THERAPIES SERIES
Discharge: HOME | End: 2021-07-20
Payer: MEDICARE

## 2021-07-20 NOTE — FLOWSHEET NOTE
Physical Therapy  Cancellation/No-show Note  Patient Name:  Pratima Nair  :  1995   Date:  2021  Cancelled visits to date: 1  No-shows to date: 1    Patient status for today's appointment patient:  []  Cancelled   []  Rescheduled appointment  [x]  No-show      Reason given by patient:  []  Patient ill      has a fever  []  Conflicting appointment  []  No transportation    []  Conflict with work  [x]  No reason given  []  Other:     Comments:      Phone call information:   [x]  Phone call made today to patient at 3:20pm at number provided:      []  Patient answered, conversation as follows:    [x]  Patient did not answer, message left as follows: missed apt, next apt on Aug 4, please call if unable to attend or if need to hold therapy  []  Phone call not made today  []  Phone call not needed - pt contacted us to cancel and provided reason for cancellation.      Electronically signed by:  Jaret Mckeon PT

## 2021-09-30 ENCOUNTER — TELEPHONE (OUTPATIENT)
Dept: FAMILY MEDICINE CLINIC | Age: 26
End: 2021-09-30

## 2021-09-30 DIAGNOSIS — Z51.81 THERAPEUTIC DRUG MONITORING: Primary | ICD-10-CM

## 2021-10-06 ENCOUNTER — NURSE TRIAGE (OUTPATIENT)
Dept: OTHER | Facility: CLINIC | Age: 26
End: 2021-10-06

## 2021-10-08 ENCOUNTER — PATIENT MESSAGE (OUTPATIENT)
Dept: FAMILY MEDICINE CLINIC | Age: 26
End: 2021-10-08

## 2021-10-08 NOTE — TELEPHONE ENCOUNTER
From: Cheo Gandara  To:  LYNNETTE Marquez - CNP  Sent: 10/8/2021 1:26 PM EDT  Subject: Visit Follow-Up Question    I have never seen Dmitriy Ly a day in my life my psychiatrist told me I can go through my family doctor to get my blood drawn so it's never been an issue before not sure why it is all of a sudden?????

## 2021-11-23 ENCOUNTER — TELEPHONE (OUTPATIENT)
Dept: FAMILY MEDICINE CLINIC | Age: 26
End: 2021-11-23

## 2021-11-23 NOTE — TELEPHONE ENCOUNTER
PT @  [phone]      PT [address] [place] [address] ON 11/20/21 - SHE HAD A COVID TEST AND THEY TOLD HER TO CALL PCP FOR RESULTS     PLEASE CALL PATIENT

## 2021-12-03 ENCOUNTER — PATIENT MESSAGE (OUTPATIENT)
Dept: FAMILY MEDICINE CLINIC | Age: 26
End: 2021-12-03

## 2022-01-10 ENCOUNTER — NURSE TRIAGE (OUTPATIENT)
Dept: OTHER | Facility: CLINIC | Age: 27
End: 2022-01-10

## 2022-01-10 ENCOUNTER — TELEPHONE (OUTPATIENT)
Dept: FAMILY MEDICINE CLINIC | Age: 27
End: 2022-01-10

## 2022-01-10 NOTE — TELEPHONE ENCOUNTER
She needs to be evaluated as these symptoms could denote something serious! Has she checked her blood pressure? Has she had symptoms like this before? Really needs to be evaluated and ER would be best in case needs head CT, otherwise if continues to refuse ER visit, can go to urgent care or see someone in office tomorrow, but ER is definitely where she should go!

## 2022-01-10 NOTE — TELEPHONE ENCOUNTER
Received call from [facility] at [facility] with [employer] Complaint. Subjective: Caller states \"I think my blood pressure has been high because I have been feeling dizzy and my ears and chest turn very red. \"     Current Symptoms:   Dizzy- lightheaded  Reddened ears and chest  Ringing to ears  Headache  Blurred vision    Pt attributes symptoms to her HTN although does not have any way to check her BP  Not taking prescribed BP med d/t restless leg syndrome    Onset: 1 month ago; gradual    Associated Symptoms: NA    Pain Severity: denies    Temperature: denies by parent's tactile estimate    What has been tried: nothing    LMP: 12/10/21 Pregnant: No    Recommended disposition:   2nd level triage,   Was told from  that providers were busy and would send a message. Pipo Leon that the provider or nurse should call back today. Advised to utilize ED/UCC. Pt refused d/t COVID. Advised that if she is becoming hypertensive she is at high risk for stroke or MI if not treated. Advised to utilize ED/UCC if condition worsens or no call back received. Care advice provided, patient verbalizes understanding; denies any other questions or concerns; instructed to call back for any new or worsening symptoms. Patient/caller to follow-up with PCP      Attention Provider: Thank you for allowing me to participate in the care of your patient. The patient was connected to triage in response to information provided to the ECC/PSC. Please do not respond through this encounter as the response is not directed to a shared pool.         Reason for Disposition   SEVERE headache or neck pain    Protocols used: [id number]

## 2022-01-10 NOTE — TELEPHONE ENCOUNTER
Pt informed. States that she really does not want to go to ED, does not want to wait for 5 hrs or get sick. Explained to pt the urgency to go and the risks of elevated bp.   Pt states that she will consider going to ED./mv

## 2022-01-10 NOTE — TELEPHONE ENCOUNTER
TRIAGE NURSE CALLING PT IS HAVING DIZZINESS, SEVERE HA, RINGING IN EARS, REDNESS TO EARS & CHEST    TRIAGE NURSE ADVISED TO GO TO ER - PATIENT REFUSING.         PLEASE CALL   PT @  [phone]

## 2022-03-20 ENCOUNTER — HOSPITAL ENCOUNTER (OUTPATIENT)
Age: 27
Discharge: HOME | End: 2022-03-20
Payer: MEDICARE

## 2022-03-20 ENCOUNTER — HOSPITAL ENCOUNTER (OUTPATIENT)
Dept: GENERAL RADIOLOGY | Age: 27
Discharge: HOME | End: 2022-03-20
Payer: MEDICARE

## 2022-03-20 DIAGNOSIS — S89.92XA INJURY OF LEFT KNEE, INITIAL ENCOUNTER: ICD-10-CM

## 2022-03-20 PROCEDURE — 73564 X-RAY EXAM KNEE 4 OR MORE: CPT

## 2022-03-22 ENCOUNTER — TELEPHONE (OUTPATIENT)
Dept: ORTHOPEDIC SURGERY | Age: 27
End: 2022-03-22

## 2022-03-22 ENCOUNTER — OFFICE VISIT (OUTPATIENT)
Dept: ORTHOPEDIC SURGERY | Age: 27
End: 2022-03-22
Payer: MEDICARE

## 2022-03-22 ENCOUNTER — HOSPITAL ENCOUNTER (OUTPATIENT)
Dept: VASCULAR LAB | Age: 27
Discharge: HOME | End: 2022-03-22
Payer: MEDICARE

## 2022-03-22 VITALS — HEIGHT: 65 IN | RESPIRATION RATE: 16 BRPM | WEIGHT: 162 LBS | BODY MASS INDEX: 26.99 KG/M2

## 2022-03-22 DIAGNOSIS — Z86.711 PERSONAL HISTORY OF PULMONARY EMBOLISM: ICD-10-CM

## 2022-03-22 DIAGNOSIS — M79.89 LEFT LEG SWELLING: ICD-10-CM

## 2022-03-22 DIAGNOSIS — S86.812A TRAUMATIC MEDIAL RETINACULAR TEAR OF KNEE, LEFT, INITIAL ENCOUNTER: ICD-10-CM

## 2022-03-22 DIAGNOSIS — M25.562 LEFT KNEE PAIN, UNSPECIFIED CHRONICITY: Primary | ICD-10-CM

## 2022-03-22 PROCEDURE — 99204 OFFICE O/P NEW MOD 45 MIN: CPT | Performed by: PHYSICIAN ASSISTANT

## 2022-03-22 PROCEDURE — G8419 CALC BMI OUT NRM PARAM NOF/U: HCPCS | Performed by: PHYSICIAN ASSISTANT

## 2022-03-22 PROCEDURE — 4004F PT TOBACCO SCREEN RCVD TLK: CPT | Performed by: PHYSICIAN ASSISTANT

## 2022-03-22 PROCEDURE — L1830 KO IMMOB CANVAS LONG PRE OTS: HCPCS | Performed by: PHYSICIAN ASSISTANT

## 2022-03-22 PROCEDURE — G8427 DOCREV CUR MEDS BY ELIG CLIN: HCPCS | Performed by: PHYSICIAN ASSISTANT

## 2022-03-22 PROCEDURE — 93971 EXTREMITY STUDY: CPT

## 2022-03-22 PROCEDURE — G8484 FLU IMMUNIZE NO ADMIN: HCPCS | Performed by: PHYSICIAN ASSISTANT

## 2022-03-22 RX ORDER — HYDROCODONE BITARTRATE AND ACETAMINOPHEN 5; 325 MG/1; MG/1
1 TABLET ORAL EVERY 6 HOURS PRN
Qty: 28 TABLET | Refills: 0 | Status: SHIPPED | OUTPATIENT
Start: 2022-03-22 | End: 2022-03-29

## 2022-03-22 NOTE — TELEPHONE ENCOUNTER
STAT venous doppler scheduled for today at [facility] at 3:30pm    2:40 arrival/ pt informed

## 2022-03-22 NOTE — PROGRESS NOTES
Subjective:      Patient ID: Lissy Smith is a 32 y.o. female who is here for an initial evaluation of her left knee injury. HPI:   She states on 3/15/2022 she fell off of a hover board landing on both left and right knees. She sustained abrasions to her left and right knee. She states her right knee is doing okay but she is having significant pain in her left knee. 1/3/2019 2022 she was seen at an urgent care for this injury. She was placed in a knee brace. He was seen back at the urgent care the following day and was given 2 injections of steroids. She did have x-rays taken as an outpatient at [facility] radiology on 3/20/2022. Pain Scale 10/10 VAS. Location of pain anterior medial aspect of her left knee. Pain is worse with flexion of her left knee. Pain improves with extension, elevation. Previous treatments have included Tylenol, NSAIDs without relief. She has been in an elastic sleeve and Ace wrap. She has developed significant swelling in her left lower extremity which may or may not be related to the brace and Ace wrap. She does have a history of prior PE related to implanted birth control. Review of Systems:  I have reviewed the clinically relevant past medical history, medications, allergies, family history, social history, and 13 point Review of Systems from the patient's recent history form & documented any details relevant to today's presenting complaints in the history above. The patient's self-reported past medical history, medications, allergies, family history, social history, and Review of Systems form from today's date have been scanned into the chart under the \"Media\" tab. As outlined in the HPI. Negative for fever or chills. Negative for poly-joint pain, swelling and stiffness. Negative for numbness or tingling into the affected extremity.      Past Medical History:   Diagnosis Date    Anxiety 02/2018    Depressive disorder 2/2/2018    History of pulmonary embolism 2017    implanted birth control? saw hematologist @ [address] [place] [address] Dr Braden of blood clots     Hypertension     Migraines     Psychosis ([facility] [address] 2018    Smoker 2012       Family History   Problem Relation Age of Onset    Hypertension Father     Hypertension Mother     Bipolar Disorder Mother     Anxiety Disorder Mother     Depression Mother     Depression Sister     Depression Brother        Past Surgical History:   Procedure Laterality Date     SECTION  11/28/2011    X2, 2017 and 2011     SECTION  2017    CHOLECYSTECTOMY, LAPAROSCOPIC  2017    ROBOTIC XI ASSISTED LAPAROSCOPIC CHOLECYSTECTOMY WITH CHOLANGIOGRAM    LAPAROSCOPY N/A 2021    ROBOTIC ASSISTED RESECTION OF ENDOMETRIOSIS - CO2 LASER performed by Balaji La MD at [address] GASTROINTESTINAL ENDOSCOPY N/A 2021    EGD DIAGNOSTIC ONLY performed by Michael He MD at [facility] [address] Not on file   Tobacco Use    Smoking status: Current Every Day Smoker     Packs/day: 1.00     Years: 7.00     Pack years: 7.00     Types: Cigarettes, E-Cigarettes    Smokeless tobacco: Never Used    Tobacco comment: off and on since Feb   Vaping Use    Vaping Use: Every day    Substances: Flavoring    Devices: Disposable   Substance and Sexual Activity    Alcohol use: Yes     Comment: socially    Drug use: Never    Sexual activity: Not Currently     Partners: Male       Current Outpatient Medications   Medication Sig Dispense Refill    pantoprazole (PROTONIX) 20 MG tablet Take 1 tablet by mouth daily 30 tablet 3    metoclopramide (REGLAN) 10 MG tablet Take 1 tablet by mouth 3 times daily (with meals) 120 tablet 3    acetaminophen (TYLENOL) 500 MG tablet Take 1 tablet by mouth 4 times daily as needed for Pain 120 tablet 0    docusate sodium (COLACE) 100 MG capsule Take 1 capsule by mouth daily as needed for Constipation 60 capsule 1    amphetamine-dextroamphetamine (ADDERALL XR) 10 MG extended release capsule Take 10 mg by mouth 2 times daily.  clonazePAM (KLONOPIN) 2 MG tablet Take 2 mg by mouth 3 times daily as needed.  propranolol (INDERAL) 20 MG tablet Take 20 mg by mouth daily       lithium 300 MG capsule Take 1 capsule by mouth 4 times daily       dicyclomine (BENTYL) 10 MG capsule Take 1 capsule by mouth every 6 hours as needed (cramps) 20 capsule 0     No current facility-administered medications for this visit. Allergies   Allergen Reactions    Etonogestrel      Other reaction(s): Other (See Comments)  Per [facility] may have contributed to PE in 2017    Toradol [Ketorolac Tromethamine] Itching    Morphine Itching, Rash and Hives    Naprosyn [Naproxen] Hives, Swelling and Rash         Objective:     She is alert, oriented x 3, pleasant, well nourished, developed and in no acute distress. Resp 16   Ht 5' 5\" (1.651 m)   Wt 162 lb (73.5 kg)   BMI 26.96 kg/m²      Examination of the left knee: Inspection of the skin small abrasion anterior medial knee. Inspection of the soft tissues moderate soft tissue swelling. The overall alignment of the knee is neutral.  Gait is antalgic. There is a mild intra-articular effusion. AROM:           PROM:  Extension-         0                   0  Flexion -           40                   40  There moderate pain associated with ROM testing. Medial joint line is somewhat tender to palpation. Lateral joint line is not tender to palpation. Exquisite tenderness over the medial patellar retinaculum. Pes anserine bursa is not tender to palpation. Patellar tendon is not tender to palpation. Quadriceps tendon is not tender to palpation. Collateral ligaments is not tender to palpation. Positive apprehension test.    Lower extremities:  She has 5/5 motor strength of bilateral lower extremities.    She has a negative X-rays concerning for possible patellar tendon disruption. I had an extensive discussion with Ms. Luca Harrison regarding the natural history, etiology, and long term consequences of her condition. I have presented reasonable alternatives to the patient's proposed care, treatment, and services. Risks and benefits of the treatment options also reviewed in detail. I have outlined a treatment plan with them. She has had full opportunity to ask her questions. I have answered them all to her satisfaction. I feel that Ms. Luca Harrison understands our discussion today. Plan:  Medications-   OTC NSAIDS discussed. 1. The most common side effects from NSAIDs are stomachaches, heartburn, and nausea. NSAIDs may irritate the stomach lining. If the medicine upsets your stomach, you can try taking it with food. But if that doesn't help, talk with your doctor to make sure it's not a more serious problem, such as a stomach ulcer or bleeding in the stomach or intestines. 2. Using NSAIDs may:  ? Lead to high blood pressure. ? Make symptoms of heart failure worse. ? Raise the risk of heart attack, stroke, kidney damage, and skin reactions. 3. Your risks are greater if you take NSAIDs at higher doses or for longer than the label says. People who are older than 72 or who have heart, stomach, or intestinal disease have a higher risk for problems. Norco for pain. Controlled substances monitoring: possible medication side effects, risk of tolerance and/or dependence, and alternative treatments discussed and no signs of potential drug abuse or diversion identified. Further Imaging-   STAT ultrasound left lower extremity rule out DVT. History of previous DVT and PE. She will be immobilized in a long leg knee immobilizer today. MRI will be obtained of the left knee. Procedures-       Procedures    Breg Knee Immobilizer Brace     Patient was prescribed a Breg Knee Immobilizer.   The left knee will require stabilization / immobilization from this semi-rigid / rigid orthosis to improve their function. The orthosis will assist in protecting the affected area, provide functional support and facilitate healing. The prefabricated orthosis was modified in the following manner to provide a customizable fit for the patient at the time of delivery. 1. Identification of appropriate positioning and alignment of anatomical landmarks. 2. Trimming of straps and panels. Reassemble orthosis to specifically fit patient. The patient was educated and fit by a healthcare professional with expert knowledge and specialization in brace application while under the direct supervision of the treating physician. Verbal and written instructions for the use of and application of this item were provided. They were instructed to contact the office immediately should the brace result in increased pain, decreased sensation, increased swelling or worsening of the condition. Follow up-after MRI to review test results and discuss further treatment options. Call or return to clinic if these symptoms worsen or fail to improve as anticipated. Christie Perez PA-C   Senior Physician Assistant   [facility]                                         Disclaimer: This note was generated with use of a verbal recognition program (DRAGON) and an attempt was made to check for errors. It is possible that there are still dictated errors within this office note. If so, please bring any significant errors to my attention for an addendum. All efforts were made to ensure that this office note is accurate. Addendum:  Venous Ultrasound:       Impressions       Left Impression   No evidence of deep vein or superficial vein thrombosis involving the left   lower extremity and the right common femoral vein.    Patient could not tolerate compressions at Popliteal vein and Tibioperoneal   Trunk, color flow shown to show no obstruction.    Proximal Peroneal veins and Small Saphenous vein at Zone 8 were not able to be   visualized due to patient positioning.       Conclusions        Summary        Somewhat limited study however no gross evidence of deep or superficial vein    thrombosis in the left lower extremity in the veins clearly visualized and    the right common femoral vein.        Signature        ------------------------------------------------------------------    Electronically signed by Rylan Gutierrez DO (Interpreting

## 2022-03-24 ENCOUNTER — HOSPITAL ENCOUNTER (OUTPATIENT)
Dept: MRI IMAGING | Age: 27
Discharge: HOME | End: 2022-03-24
Payer: MEDICARE

## 2022-03-24 DIAGNOSIS — Z86.711 PERSONAL HISTORY OF PULMONARY EMBOLISM: ICD-10-CM

## 2022-03-24 DIAGNOSIS — M79.89 LEFT LEG SWELLING: ICD-10-CM

## 2022-03-24 DIAGNOSIS — S86.812A TRAUMATIC MEDIAL RETINACULAR TEAR OF KNEE, LEFT, INITIAL ENCOUNTER: ICD-10-CM

## 2022-03-24 PROCEDURE — 73721 MRI JNT OF LWR EXTRE W/O DYE: CPT

## 2022-03-28 ENCOUNTER — OFFICE VISIT (OUTPATIENT)
Dept: ORTHOPEDIC SURGERY | Age: 27
End: 2022-03-28
Payer: MEDICARE

## 2022-03-28 ENCOUNTER — TELEPHONE (OUTPATIENT)
Dept: ORTHOPEDIC SURGERY | Age: 27
End: 2022-03-28

## 2022-03-28 VITALS — WEIGHT: 162 LBS | HEIGHT: 65 IN | RESPIRATION RATE: 16 BRPM | BODY MASS INDEX: 26.99 KG/M2

## 2022-03-28 DIAGNOSIS — S86.812A TRAUMATIC MEDIAL RETINACULAR TEAR OF KNEE, LEFT, INITIAL ENCOUNTER: Primary | ICD-10-CM

## 2022-03-28 PROCEDURE — 99213 OFFICE O/P EST LOW 20 MIN: CPT | Performed by: PHYSICIAN ASSISTANT

## 2022-03-28 PROCEDURE — G8419 CALC BMI OUT NRM PARAM NOF/U: HCPCS | Performed by: PHYSICIAN ASSISTANT

## 2022-03-28 PROCEDURE — G8427 DOCREV CUR MEDS BY ELIG CLIN: HCPCS | Performed by: PHYSICIAN ASSISTANT

## 2022-03-28 PROCEDURE — G8484 FLU IMMUNIZE NO ADMIN: HCPCS | Performed by: PHYSICIAN ASSISTANT

## 2022-03-28 PROCEDURE — 4004F PT TOBACCO SCREEN RCVD TLK: CPT | Performed by: PHYSICIAN ASSISTANT

## 2022-03-28 NOTE — PROGRESS NOTES
Subjective:      Patient ID: Chelo Dyson is a 32 y.o. female who is here for follow up evaluation of left knee injury. She had a suspected traumatic medial retinacular tear after a fall. She has significant edema left lower extremity. She was sent for venous ultrasound of left lower extremity after last visit as well as MRI. She is here today to review the MRI results    States she does not tolerate Vicodin due to GI upset. Review Of Systems:   Negative for fever or chills. Negative for numbness or tingling in the affected extremity.        Past Medical History:   Diagnosis Date    Anxiety 2018    Depressive disorder 2018    History of pulmonary embolism 2017    implanted birth control? saw hematologist @ [address] [place] [address] Dr Braden of blood clots     Hypertension     Migraines     Psychosis ([facility] [address] 2018    Smoker 2012       Family History   Problem Relation Age of Onset    Hypertension Father     Hypertension Mother     Bipolar Disorder Mother     Anxiety Disorder Mother     Depression Mother     Depression Sister     Depression Brother        Past Surgical History:   Procedure Laterality Date     SECTION  11/28/2011    X2, 2017 and      SECTION  2017    CHOLECYSTECTOMY, LAPAROSCOPIC  2017    ROBOTIC XI ASSISTED LAPAROSCOPIC CHOLECYSTECTOMY WITH CHOLANGIOGRAM    LAPAROSCOPY N/A 2021    ROBOTIC ASSISTED RESECTION OF ENDOMETRIOSIS - CO2 LASER performed by Balaji La MD at [address] [place] [address] GASTROINTESTINAL ENDOSCOPY N/A 2021    EGD DIAGNOSTIC ONLY performed by Michael He MD at [address] [place] [address] Not on file   Tobacco Use    Smoking status: Current Every Day Smoker     Packs/day: 1.00     Years: 7.00     Pack years: 7.00     Types: Cigarettes, E-Cigarettes    Smokeless tobacco: Never Used    Tobacco comment: off and on since Feb   Vaping Use    Vaping Use: Every day    Substances: Flavoring    Devices: Disposable   Substance and Sexual Activity    Alcohol use: Yes     Comment: socially    Drug use: Never    Sexual activity: Not Currently     Partners: Male       Current Outpatient Medications   Medication Sig Dispense Refill    HYDROcodone-acetaminophen (NORCO) 5-325 MG per tablet Take 1 tablet by mouth every 6 hours as needed for Pain for up to 7 days. Take the least amount necessary to control pain. Do not operate machinery while taking this medication. Sedation Warning 28 tablet 0    pantoprazole (PROTONIX) 20 MG tablet Take 1 tablet by mouth daily 30 tablet 3    metoclopramide (REGLAN) 10 MG tablet Take 1 tablet by mouth 3 times daily (with meals) 120 tablet 3    acetaminophen (TYLENOL) 500 MG tablet Take 1 tablet by mouth 4 times daily as needed for Pain 120 tablet 0    docusate sodium (COLACE) 100 MG capsule Take 1 capsule by mouth daily as needed for Constipation 60 capsule 1    amphetamine-dextroamphetamine (ADDERALL XR) 10 MG extended release capsule Take 10 mg by mouth 2 times daily.  clonazePAM (KLONOPIN) 2 MG tablet Take 2 mg by mouth 3 times daily as needed.  propranolol (INDERAL) 20 MG tablet Take 20 mg by mouth daily       lithium 300 MG capsule Take 1 capsule by mouth 4 times daily       dicyclomine (BENTYL) 10 MG capsule Take 1 capsule by mouth every 6 hours as needed (cramps) 20 capsule 0     No current facility-administered medications for this visit. Objective:     She is alert, oriented x 3, pleasant, well nourished, developed and in no   acute distress. Resp 16   Ht 5' 5\" (1.651 m)   Wt 162 lb (73.5 kg)   BMI 26.96 kg/m²      Examination of the left knee: Inspection of the skin small abrasion anterior medial knee. Inspection of the soft tissues moderate soft tissue swelling. The overall alignment of the knee is neutral.  Gait is antalgic. There is a mild intra-articular effusion. AROM:           PROM:  Extension-         0                   0  Flexion -           40                   40  There moderate pain associated with ROM testing. Medial joint line is somewhat tender to palpation. Lateral joint line is not tender to palpation. Exquisite tenderness over the medial patellar retinaculum. Pes anserine bursa is not tender to palpation. Patellar tendon is not tender to palpation. Quadriceps tendon is not tender to palpation. Collateral ligaments is not tender to palpation. Positive apprehension test.     Lower extremities:  She has 5/5 motor strength of bilateral lower extremities. She has a negative straight leg raise, bilaterally. Deep tendon reflexes at knees and achilles are 2+. Sensation is intact to light touch L3 to S1 bilaterally. She has no clonus. Examination of the lower extremities shows intact perfusion to both lower extremities. She has no cyanosis and digigts are warm to touch, capillary refill is less than 2 seconds. She has moderate edema noted LLE with left calf tenderness. X Rays: not performed in the office today:   MRI: Obt[facility] from [facility] or an outside facility. Narrative   EXAMINATION:   MRI OF THE LEFT KNEE WITHOUT CONTRAST ,  3/24/2022 3:15 pm       TECHNIQUE:   Multiplanar multisequence MRI of the left knee was performed without the   administration of intravenous contrast.       COMPARISON:   Left knee radiograph March 20, 2022       HISTORY:   ORDERING SYSTEM PROVIDED HISTORY: Traumatic medial retinacular tear of knee,   left, initial encounter   TECHNOLOGIST PROVIDED HISTORY:   What is the sedation requirement?->None   Is the patient pregnant?->No   Reason for Exam: Traumatic medial retinacular tear of knee, left, initial   encounter. Left leg swelling.  Personal history of pulmonary embolism       FINDINGS:   MENISCI:  Medial and lateral menisci appear intact.       CRUCIATE LIGAMENTS:  The anterior and posterior cruciate ligaments are intact.       EXTENSOR MECHANISM:  The quadriceps tendon and patellar tendon are intact. Mild edema extends along both the medial and lateral patellar retinaculum   with minimal intrasubstance fluid signal along the medial patellar   retinaculum compatible with partial tearing.  There is also mild edema within   the distal vastus medialis muscle suggesting mild muscular strain.       LATERAL COLLATERAL LIGAMENT COMPLEX:  The popliteus tendon, biceps femoris   tendon, fibular collateral ligament and iliotibial band are intact.       MEDIAL COLLATERAL LIGAMENT COMPLEX:  The superficial and deep components of   the medial collateral ligament are intact.       KNEE JOINT:  No knee effusion.  The cartilage is preserved within all 3   compartments of the knee.       BONE MARROW:   Bone marrow is normal in signal without evidence of fracture,   marrow contusion or marrow occupying lesion.       SOFT TISSUES: Prepatellar soft tissue edema with small focus of organized   fluid along the anteromedial soft tissues of the knee (image 11 series 4;   image 25 series 8; image 5 through 8 series 6).         Impression   1. Partial tearing of the medial patellar retinaculum and mild muscular   strain of the distal vastus medialis muscle. 2. Prepatellar soft tissue edema with a small amount of organized fluid along   the anteromedial soft tissues which may reflect sequela of trauma or mild   prepatellar bursitis.                 Diagnosis:       ICD-10-CM    1. Traumatic medial retinacular tear of knee, left, initial encounter  U58.354X         Assessment and Plan:       Assessment:  Partial tear of the medial retinaculum and vastus medialis. Venous ultrasound negative for DVT. I had an extensive discussion with Ms. Leon Sweeney regarding the natural history, etiology, and long term consequences of her condition.    I have presented reasonable alternatives to the patient's proposed care, treatment, and services. Risks and benefits of the treatment options also reviewed in detail. I have outlined a treatment plan with them. She has had full opportunity to ask her questions. I have answered them all to her satisfaction. I feel that Ms. Ramona Calzada understands our discussion today. Plan:  Medications-Tylenol recommended for pain at this point. Continue knee immobilizer for 3 weeks. Encouraged weightbearing as tolerated in the knee immobilizer. Return in the office in 3 weeks at that time should be placed in a J brace and prescribed therapy. Follow up- 3 weeks. Call or return to clinic if these symptoms worsen or fail to improve as anticipated. Lennox Stabler PA-C   Senior Physician Assistant   [facility]                                         Disclaimer: This note was generated with use of a verbal recognition program (DRAGON) and an attempt was made to check for errors. It is possible that there are still dictated errors within this office note. If so, please bring any significant errors to my attention for an addendum. All efforts were made to ensure that this office note is accurate.

## 2022-04-18 ENCOUNTER — OFFICE VISIT (OUTPATIENT)
Dept: ORTHOPEDIC SURGERY | Age: 27
End: 2022-04-18
Payer: MEDICARE

## 2022-04-18 VITALS — WEIGHT: 162 LBS | HEIGHT: 65 IN | BODY MASS INDEX: 26.99 KG/M2

## 2022-04-18 DIAGNOSIS — S86.812A TRAUMATIC MEDIAL RETINACULAR TEAR OF KNEE, LEFT, INITIAL ENCOUNTER: Primary | ICD-10-CM

## 2022-04-18 PROCEDURE — 99213 OFFICE O/P EST LOW 20 MIN: CPT | Performed by: PHYSICIAN ASSISTANT

## 2022-04-18 PROCEDURE — L1810 KO ELASTIC WITH JOINTS: HCPCS | Performed by: PHYSICIAN ASSISTANT

## 2022-04-18 PROCEDURE — 4004F PT TOBACCO SCREEN RCVD TLK: CPT | Performed by: PHYSICIAN ASSISTANT

## 2022-04-18 PROCEDURE — G8427 DOCREV CUR MEDS BY ELIG CLIN: HCPCS | Performed by: PHYSICIAN ASSISTANT

## 2022-04-18 PROCEDURE — G8419 CALC BMI OUT NRM PARAM NOF/U: HCPCS | Performed by: PHYSICIAN ASSISTANT

## 2022-04-18 NOTE — PROGRESS NOTES
on since Feb   Vaping Use    Vaping Use: Every day    Substances: Flavoring    Devices: Disposable   Substance and Sexual Activity    Alcohol use: Yes     Comment: socially    Drug use: Never    Sexual activity: Not Currently     Partners: Male       Current Outpatient Medications   Medication Sig Dispense Refill    docusate sodium (COLACE) 100 MG capsule Take 1 capsule by mouth daily as needed for Constipation 60 capsule 1    amphetamine-dextroamphetamine (ADDERALL XR) 10 MG extended release capsule Take 10 mg by mouth 2 times daily.  clonazePAM (KLONOPIN) 2 MG tablet Take 2 mg by mouth 3 times daily as needed.  propranolol (INDERAL) 20 MG tablet Take 20 mg by mouth daily       lithium 300 MG capsule Take 1 capsule by mouth 4 times daily       dicyclomine (BENTYL) 10 MG capsule Take 1 capsule by mouth every 6 hours as needed (cramps) 20 capsule 0    pantoprazole (PROTONIX) 20 MG tablet Take 1 tablet by mouth daily 30 tablet 3    metoclopramide (REGLAN) 10 MG tablet Take 1 tablet by mouth 3 times daily (with meals) 120 tablet 3    acetaminophen (TYLENOL) 500 MG tablet Take 1 tablet by mouth 4 times daily as needed for Pain 120 tablet 0     No current facility-administered medications for this visit. Objective:     She is alert, oriented x 3, pleasant, well nourished, developed and in no   acute distress. Ht 5' 5\" (1.651 m)   Wt 162 lb (73.5 kg)   BMI 26.96 kg/m²      Examination of the left knee: Inspection of the skin small abrasion anterior medial knee. Inspection of the soft tissues moderate soft tissue swelling. The overall alignment of the knee is neutral.  Gait is antalgic. There is a mild intra-articular effusion.                         AROM:           PROM:  Extension-         0                   0  Flexion -           06                  60  There moderate pain associated with ROM testing. Medial joint line is somewhat tender to palpation.    Lateral joint line is not tender to palpation. Exquisite tenderness over the medial patellar retinaculum. Pes anserine bursa is not tender to palpation. Patellar tendon is not tender to palpation. Quadriceps tendon is not tender to palpation. Collateral ligaments is not tender to palpation. Positive apprehension test.     Lower extremities:  She has 5/5 motor strength of bilateral lower extremities. She has a negative straight leg raise, bilaterally.    Deep tendon reflexes at knees and achilles are 2+. Sensation is intact to light touch L3 to S1 bilaterally.   She has no clonus.      Examination of the lower extremities shows intact perfusion to both lower extremities. She has no cyanosis and digigts are warm to touch, capillary refill is less than 2 seconds. She has moderate edema noted LLE with left calf tenderness.          X Rays: not performed in the office today:     Diagnosis:       ICD-10-CM    1. Traumatic medial retinacular tear of knee, left, initial encounter  S86.812A Ambulatory referral to Physical Therapy        Assessment and Plan:       Assessment:  Traumatic medial retinacular tear. Injury is now 4 weeks out. I had an extensive discussion with Ms. Heather Villafana regarding the natural history, etiology, and long term consequences of her condition. I have presented reasonable alternatives to the patient's proposed care, treatment, and services. Risks and benefits of the treatment options also reviewed in detail. I have outlined a treatment plan with them. She has had full opportunity to ask her questions. I have answered them all to her satisfaction. I feel that Ms. Heather Villafana understands our discussion today. Plan:  Medications-   OTC NSAIDS discussed. 1. The most common side effects from NSAIDs are stomachaches, heartburn, and nausea. NSAIDs may irritate the stomach lining. If the medicine upsets your stomach, you can try taking it with food.  But if that doesn't help, talk with your doctor to make sure it's not a more serious problem, such as a stomach ulcer or bleeding in the stomach or intestines. 2. Using NSAIDs may:  ? Lead to high blood pressure. ? Make symptoms of heart failure worse. ? Raise the risk of heart attack, stroke, kidney damage, and skin reactions. 3. Your risks are greater if you take NSAIDs at higher doses or for longer than the label says. People who are older than 72 or who have heart, stomach, or intestinal disease have a higher risk for problems. PT-PT Rx provided today for quad/VMO strengthening. Procedures-she will be placed in a J brace. Procedures    Breg Hinged Lateral Stabilizer Knee Brace     Patient was prescribed a Breg Hinged Lateral Stabilizer. The left knee will require stabilization / immobilization from this semi-rigid / rigid orthosis to improve their function. The orthosis will assist in protecting the affected area, provide functional support and facilitate healing. The patient was educated and fit by a healthcare professional with expert knowledge and specialization in brace application while under the direct supervision of the physician. Verbal and written instructions for the use of and application of this item were provided. They were instructed to contact the office immediately should the brace result in increased pain, decreased sensation, increased swelling or worsening of the condition. Follow up:  Call or return to clinic if these symptoms worsen or fail to improve as anticipated. Tabitha Robles PA-C   Senior Physician Assistant   [facility]                                         Disclaimer: This note was generated with use of a verbal recognition program (DRAGON) and an attempt was made to check for errors. It is possible that there are still dictated errors within this office note.   If so, please bring any significant errors to my attention for an addendum. All efforts were made to ensure that this office note is accurate.

## 2022-04-26 ENCOUNTER — TELEMEDICINE (OUTPATIENT)
Dept: FAMILY MEDICINE CLINIC | Age: 27
End: 2022-04-26
Payer: MEDICARE

## 2022-04-26 ENCOUNTER — TELEPHONE (OUTPATIENT)
Dept: FAMILY MEDICINE CLINIC | Age: 27
End: 2022-04-26

## 2022-04-26 DIAGNOSIS — R51.9 ACUTE NONINTRACTABLE HEADACHE, UNSPECIFIED HEADACHE TYPE: Primary | ICD-10-CM

## 2022-04-26 DIAGNOSIS — R53.83 FATIGUE, UNSPECIFIED TYPE: ICD-10-CM

## 2022-04-26 PROCEDURE — G8428 CUR MEDS NOT DOCUMENT: HCPCS | Performed by: FAMILY MEDICINE

## 2022-04-26 PROCEDURE — G8419 CALC BMI OUT NRM PARAM NOF/U: HCPCS | Performed by: FAMILY MEDICINE

## 2022-04-26 PROCEDURE — 4004F PT TOBACCO SCREEN RCVD TLK: CPT | Performed by: FAMILY MEDICINE

## 2022-04-26 PROCEDURE — 99213 OFFICE O/P EST LOW 20 MIN: CPT | Performed by: FAMILY MEDICINE

## 2022-04-26 RX ORDER — METHOCARBAMOL 750 MG/1
750 TABLET, FILM COATED ORAL 4 TIMES DAILY
Qty: 20 TABLET | Refills: 0 | Status: SHIPPED | OUTPATIENT
Start: 2022-04-26 | End: 2022-05-01

## 2022-04-26 RX ORDER — KETOPROFEN 200 MG
200 CAPSULE, EXTENDED RELEASE PELLETS 24 HR ORAL DAILY
Qty: 10 CAPSULE | Refills: 0 | Status: SHIPPED | OUTPATIENT
Start: 2022-04-26 | End: 2022-04-27

## 2022-04-26 NOTE — PROGRESS NOTES
Esther Hayward (:  1995) is a Established patient, here for evaluation of the following:    Assessment & Plan     Diagnosis Orders   1. Acute nonintractable headache, unspecified headache type     2. Fatigue, unspecified type       ? Viral syndrome w/ acute ha/ fatigue - no fever - not like typical migraine and some neck pain but ? Not meningismus - sxs for nearly 60 hours w/ acute worsening over last 24 hours other than fatigue  No rash - dw/ pt possibility of viral meningitis - and encouraged her to go to ER if symptoms worsen significantly. Since able to take ibuprofen okay, will give ketoprofen to take w/ food each evening x10 days in lieu of ibuprofen   Robaxin 750 qid prn for muscle spasm/ neck pain  Tylenol/ lidoderm patches/ heat/ ice to neck encouraged  Send me a message to let me know how she is doing tomorrow    Below is the assessment and plan developed based on review of pertinent history, physical exam, labs, studies, and medications. Subjective   HPI   Chief Complaint   Patient presents with    Other     FACIAL PAIN HEAD HURTS WEAK DIZZY CONGESTION NO FEVER NOT COUGHING FEELS HOT SX STARTED CONSTANTIN NIGHT    ? Related to recent vaccine? Started Constantin night- fatigue/ severe ha w/ light sensitivity  Hard to get son ready for work -got covid shot 3 months ago. Some pain in left side of neck - hard to touch chin to chest.  No skin changes. All over headache -not really facial pain  Using tylenol/ ibuprofen. Not like typical migraine  Periodic headache, lightheaded/ ringing in ears - ? Related to bp  Has not been taking bp meds  Pain w/ bending head down  123/75, hr 52. On check today. Woke up w/ sxs  am  Fatigue seemed even worse yesterday. Head feels hot but no fever. Sleeping seems to be okay. Seems to tolerate ibuprofen okay but had ?  Hives w/ naproxen  BP Readings from Last 3 Encounters:   21 130/86   21 130/74   21 128/89       Review of Systems Objective   Patient-Reported Vitals       Physical Exam  nad - voice somewhat quiet but moving around house okay w/o problem  May look slightly uncomfortable           Lurdes Howell, was evaluated through a synchronous (real-time) audio-video encounter. The patient (or guardian if applicable) is aware that this is a billable service, which includes applicable co-pays. This Virtual Visit was conducted with patient's (and/or legal guardian's) consent. The visit was conducted pursuant to the emergency declaration under the [address] [place] [address], [address] [place] [address] authority and the [employer] and [employer] General Act. Patient identification was verified, and a caregiver was present when appropriate. The patient was located at home in a state where the provider was licensed to provide care.        --Bharat Werner MD

## 2022-04-26 NOTE — TELEPHONE ENCOUNTER
PATIENT CALLED JANE GIL ON CALL WITH COLD [facility]MPTOMS TODAY AT 7:50AM. OFFICE IS OPEN SO I CALLED AND SCHEDULED PATIENT FOR A VIRTUAL APPT WITH DR. ROBERTSON TODAY AT 4:20PM. [place]

## 2022-04-27 ENCOUNTER — TELEPHONE (OUTPATIENT)
Dept: FAMILY MEDICINE CLINIC | Age: 27
End: 2022-04-27

## 2022-04-27 NOTE — TELEPHONE ENCOUNTER
noe called and said the ketoprofen is on back order and would like to know if there is another medication that can be called in for the patient

## 2022-04-27 NOTE — TELEPHONE ENCOUNTER
----- Message from Sari Arboleda sent at 4/27/2022 11:17 AM EDT -----  Subject: Medication Problem    QUESTIONS  Name of Medication? Ketoprofen  MG CP24  Patient-reported dosage and instructions? take 1 time a day   What question or problem do you have with the medication? The pharmacy   said that they do not carry that anymore. It is not on the market anymore   Preferred Pharmacy? [facility] [id number], [address] [facility] [address]  Pharmacy phone number (if available)? [phone]  Additional Information for Provider? Pharmacy has said they have reached   out to the office but have not heard back from them   ---------------------------------------------------------------------------  --------------  [address]  What is the best way for the office to contact you? OK to leave message on   voicemail  Preferred Call Back Phone Number? [phone]  ---------------------------------------------------------------------------  --------------  SCRIPT ANSWERS  Relationship to Patient?  Self

## 2022-04-27 NOTE — TELEPHONE ENCOUNTER
CALLED AND SPOKE TO Shadia Leo. THEY RECEIVED THE NEW SCRIPT AND PATIENT WILL BE ABLE TO  TOMORROW. CALLED AND ADVISED PATIENT.  SC

## 2022-04-28 ENCOUNTER — TELEPHONE (OUTPATIENT)
Dept: ORTHOPEDIC SURGERY | Age: 27
End: 2022-04-28

## 2022-04-28 NOTE — TELEPHONE ENCOUNTER
General Question     Subject: Patient requesting a note to go back to work. You can put the approved note in my chart. Patient requesting a call back.   Patient: Greg Edmonds  Contact Number: [phone]

## 2022-05-06 NOTE — PROGRESS NOTES
Awake,room air,consuming po intake,denies pain ,vss. Patient alert and oriented x1  No signs of pain noted  Patient re-oriented and re-directed multiple times; and multiple attempts to get out of bed during the beginning of shift  Patient was offer fluids and repositioned in bed  Patient then settled down and fell asleep  Patient slept for the rest of the night  No deviation in assessment finding noted when compared to previous documentation  Will continue to monitor

## 2022-06-15 ENCOUNTER — TELEMEDICINE (OUTPATIENT)
Dept: FAMILY MEDICINE CLINIC | Age: 27
End: 2022-06-15
Payer: MEDICARE

## 2022-06-15 DIAGNOSIS — J40 BRONCHITIS: Primary | ICD-10-CM

## 2022-06-15 DIAGNOSIS — J01.40 ACUTE NON-RECURRENT PANSINUSITIS: ICD-10-CM

## 2022-06-15 PROCEDURE — 4004F PT TOBACCO SCREEN RCVD TLK: CPT | Performed by: NURSE PRACTITIONER

## 2022-06-15 PROCEDURE — G8427 DOCREV CUR MEDS BY ELIG CLIN: HCPCS | Performed by: NURSE PRACTITIONER

## 2022-06-15 PROCEDURE — G8419 CALC BMI OUT NRM PARAM NOF/U: HCPCS | Performed by: NURSE PRACTITIONER

## 2022-06-15 PROCEDURE — 99213 OFFICE O/P EST LOW 20 MIN: CPT | Performed by: NURSE PRACTITIONER

## 2022-06-15 RX ORDER — METHYLPREDNISOLONE 4 MG/1
TABLET ORAL
Qty: 1 KIT | Refills: 0 | Status: SHIPPED | OUTPATIENT
Start: 2022-06-15 | End: 2022-06-21

## 2022-06-15 RX ORDER — ALBUTEROL SULFATE 90 UG/1
1-2 AEROSOL, METERED RESPIRATORY (INHALATION) EVERY 6 HOURS PRN
Qty: 18 G | Refills: 0 | Status: SHIPPED | OUTPATIENT
Start: 2022-06-15

## 2022-06-15 ASSESSMENT — ENCOUNTER SYMPTOMS
COLOR CHANGE: 0
CHEST TIGHTNESS: 0
VOICE CHANGE: 1
CONSTIPATION: 0
SINUS PRESSURE: 1
NAUSEA: 0
SORE THROAT: 1
ABDOMINAL DISTENTION: 0
SINUS PAIN: 1
SHORTNESS OF BREATH: 0
COUGH: 1
TROUBLE SWALLOWING: 1
ABDOMINAL PAIN: 0
DIARRHEA: 0
EYE DISCHARGE: 0
BACK PAIN: 0

## 2022-06-15 ASSESSMENT — PATIENT HEALTH QUESTIONNAIRE - PHQ9
8. MOVING OR SPEAKING SO SLOWLY THAT OTHER PEOPLE COULD HAVE NOTICED. OR THE OPPOSITE, BEING SO FIGETY OR RESTLESS THAT YOU HAVE BEEN MOVING AROUND A LOT MORE THAN USUAL: 0
3. TROUBLE FALLING OR STAYING ASLEEP: 0
9. THOUGHTS THAT YOU WOULD BE BETTER OFF DEAD, OR OF HURTING YOURSELF: 0
2. FEELING DOWN, DEPRESSED OR HOPELESS: 0
10. IF YOU CHECKED OFF ANY PROBLEMS, HOW DIFFICULT HAVE THESE PROBLEMS MADE IT FOR YOU TO DO YOUR WORK, TAKE CARE OF THINGS AT HOME, OR GET ALONG WITH OTHER PEOPLE: 0
SUM OF ALL RESPONSES TO PHQ9 QUESTIONS 1 & 2: 0
SUM OF ALL RESPONSES TO PHQ QUESTIONS 1-9: 0
5. POOR APPETITE OR OVEREATING: 0
4. FEELING TIRED OR HAVING LITTLE ENERGY: 0
SUM OF ALL RESPONSES TO PHQ QUESTIONS 1-9: 0
1. LITTLE INTEREST OR PLEASURE IN DOING THINGS: 0
7. TROUBLE CONCENTRATING ON THINGS, SUCH AS READING THE NEWSPAPER OR WATCHING TELEVISION: 0
6. FEELING BAD ABOUT YOURSELF - OR THAT YOU ARE A FAILURE OR HAVE LET YOURSELF OR YOUR FAMILY DOWN: 0

## 2022-06-15 NOTE — PROGRESS NOTES
Rin Hester (:  1995) is a 32 y.o. female,Established patient, here for evaluation of the following chief complaint(s): Sinus Problem (PT C/O VOICE GONE, MILDLY PRODUCTIVE COUGH, HEADACHE, HEAD AND CHEST CONGESTION, RUNNY/STUFFY NOSE, CHILLS, FATIGUE SINCE 3AM. )      Rin Hester, was evaluated through a synchronous (real-time) audio-video encounter. The patient (or guardian if applicable) is aware that this is a billable service, which includes applicable co-pays. This Virtual Visit was conducted with patient's (and/or legal guardian's) consent. The visit was conducted pursuant to the emergency declaration under the [address] [place] [address] authority and the [employer] and [employer] Act. Patient identification was verified, and a caregiver was present when appropriate. The patient was located at home in a state where the provider was licensed to provide care. Patient identification was verified at the start of the visit: Yes    ASSESSMENT/PLAN:  1. Bronchitis  -     albuterol sulfate HFA (PROVENTIL;VENTOLIN;PROAIR) 108 (90 Base) MCG/ACT inhaler; Inhale 1-2 puffs into the lungs every 6 hours as needed for Wheezing or Shortness of Breath, Disp-18 g, R-0Normal  -     Spacer/Aero-Holding Chambers GILLIAN; ONCE Starting Wed 6/15/2022, For 1 dose, Disp-1 each, R-0, Normal  -     methylPREDNISolone (MEDROL DOSEPACK) 4 MG tablet; Take by mouth., Disp-1 kit, R-0Normal   Likely viral, pt wants Zpack, discussed should rule out COVID and flu and strep first d/t symptoms   Pt son diagnosed with bronchiolitis   Symptoms for her just started 1 day ago (13 hours ago to be exact), too early for abx   Unknown last abx   Send Zpack after minimum of 5-7 days of symptoms, recommend rule out COVID and flu first   Work letter off x 2 days  2. Acute non-recurrent pansinusitis  -     methylPREDNISolone (MEDROL DOSEPACK) 4 MG tablet;  Take by mouth., Disp-1 kit, R-0Normal   Mucinex twice daily   Hot showers   Vicks vapo rub to chest, under nares   Humidifier or vaporizer near bed where sleeping   Sinus rinse as needed/Netti pot   Push fluids, increase protein intake   Cough and deep breathe   OTC (over the counter) Cough suppressant at night to aid with sleep- robitussin, delsym   If breathing issues, cough and deep breathe, lay on belly (prone) to aid with oxygenating lungs if possibly COVID   Vitamin C, zinc, and vitamin D supplements to boost immune system   Cepacol/throat lozenges for sore throat and cough   Warm tea/tea with honey for cough and sore throat   Warm water or salt water gargle for sore throat   If given antibiotic, take in its entirety until completion to decrease antibiotic resistance developing   Tylenol and ibuprofen for aches and pains and fever >100 F      Return in about 3 months (around 9/15/2022) for Physical Exam and Labs. SUBJECTIVE/OBJECTIVE:  HPI    Chief Complaint   Patient presents with    Sinus Problem     PT C/O VOICE GONE, MILDLY PRODUCTIVE COUGH, HEADACHE, HEAD AND CHEST CONGESTION, RUNNY/STUFFY NOSE, CHILLS, FATIGUE SINCE 3AM.      Upper Respiratory Infection  Patient complains of symptoms of a URI. Symptoms include loss of voice, mildly productive nasty cough, headache, head and chest congestion, runny and stuffy nose, chills, fatigue. Onset of symptoms was 1 day ago, rapidly worsening since that time. She also c/o feels feverish and facial pain and sore throat for the past 1 day . She is drinking plenty of fluids. Evaluation to date: none. Treatment to date: sinus and congestion medication, zyrtec, gargle salt water. Son is recent sick contact, 10 yo. Pt son had same illness and was diagnosed with bronchiolitis: was tested for COVID and flu. Child slept in bed with her last evening. Pt says she has to be at work today and without a work note they expect her there. Pt not happy to not get abx today.  Says her [] Abnormal -     Mental status: [x] Alert and awake  [x] Oriented to person/place/time [x] Able to follow commands    [] Abnormal -     Eyes:   EOM    [x]  Normal    [] Abnormal -   Sclera  [x]  Normal    [] Abnormal -          Discharge [x]  None visible   [] Abnormal -     HENT: [x] Normocephalic, atraumatic  [x] Abnormal - hoarse voice  [x] Mouth/Throat: Mucous membranes are moist    External Ears [x] Normal  [] Abnormal -    Neck: [x] No visualized mass [] Abnormal -     Pulmonary/Chest: [x] Respiratory effort normal   [x] No visualized signs of difficulty breathing or respiratory distress        [] Abnormal -      Musculoskeletal:   [x] Normal gait with no signs of ataxia         [x] Normal range of motion of neck        [] Abnormal -     Neurological:        [x] No Facial Asymmetry (Cranial nerve 7 motor function) (limited exam due to video visit)          [x] No gaze palsy        [] Abnormal -          Skin:        [x] No significant exanthematous lesions or discoloration noted on facial skin         [] Abnormal -            Psychiatric:       [x] Normal Affect [] Abnormal -        [x] No Hallucinations    Other pertinent observable physical exam findings:-          On this date 6/15/2022 I have spent 22 minutes reviewing previous notes, test results and face to face (virtual) with the patient discussing the diagnosis and importance of compliance with the treatment plan as well as documenting on the day of the visit. Care Gaps Addressed  COVID booster recommended- pt reports she is vaccinated  PAP smear recommended  Hep C screen recommended with next blood draw   PNA vaccine recommended- smoker  [facility]- [facility]      I have reviewed patient's pertinent medical history, relevant laboratory and imaging studies, and past/future health maintenance. Discussed with the patient the importance of adhering to their current medication regimen as directed.  Advised the patient that they should continue to work on eating a healthy balanced diet and staying active by exercising within their personal limits. Orders as listed above. Patient was advised to keep future appointments with their respective specialty care team(s). Patient had the opportunity to ask questions, all of which were answered to the best of my ability and with patient satisfaction. Patient understands and is agreeable with the care plan following today's visit. Patient is to schedule an appointment for any new or worsening symptoms. Go to ER for significant shortness of breath, chest pain, or uncontrolled pain or fever. I discussed with patient the risk and benefits of any medications that were prescribed today. I verified that the patient understands their medications, labs, and/or procedures. The patient is doing well with current medication regimen and does not have any barriers to adherence. The patient's self-management abilities are good. Follow Up in 3 Months for Physical and fasting labs    Brandan Elizondo is a 32 y.o. female being evaluated by a Virtual Visit (video visit) encounter to address concerns as mentioned above. A caregiver was present when appropriate. Due to this being a TeleHealth encounter (During [facility] public health emergency), evaluation of the following organ systems was limited: Vitals/Constitutional/EENT/Resp/CV/GI//MS/Neuro/Skin/Heme-Lymph-Imm. Pursuant to the emergency declaration under the [facility], [address] [place] [address] authority and the [employer] and [employer] Act, this Virtual Visit was conducted with patient's (and/or legal guardian's) consent, to reduce the patient's risk of exposure to COVID-19 and provide necessary medical care.   The patient (and/or legal guardian) has also been advised to contact this office for worsening conditions or problems, and seek emergency medical treatment and/or call 911 if deemed necessary. Services were provided through a video synchronous discussion virtually to substitute for in-person clinic visit. Patient was located at home and provider was located in office or at home. An electronic signature was used to authenticate this note.     --LYNNETTE Pena - CNP

## 2022-06-15 NOTE — PATIENT INSTRUCTIONS
Patient Education        Sinusitis: Care Instructions  Your Care Instructions     Sinusitis is an infection of the lining of the sinus cavities in your head. Sinusitis often follows a cold. It causes pain and pressure in your head andface. In most cases, sinusitis gets better on its own in 1 to 2 weeks. But some mildsymptoms may last for several weeks. Sometimes antibiotics are needed. Follow-up care is a key part of your treatment and safety. Be sure to make and go to all appointments, and call your doctor if you are having problems. It's also a good idea to know your test results and keep alist of the medicines you take. How can you care for yourself at home?  Take an over-the-counter pain medicine, such as acetaminophen (Tylenol), ibuprofen (Advil, Motrin), or naproxen (Aleve). Read and follow all instructions on the label.  If the doctor prescribed antibiotics, take them as directed. Do not stop taking them just because you feel better. You need to take the full course of antibiotics.  Be careful when taking over-the-counter cold or flu medicines and Tylenol at the same time. Many of these medicines have acetaminophen, which is Tylenol. Read the labels to make sure that you are not taking more than the recommended dose. Too much acetaminophen (Tylenol) can be harmful.  Breathe warm, moist air from a steamy shower, a hot bath, or a sink filled with hot water. Avoid cold, dry air. Using a humidifier in your home may help. Follow the directions for cleaning the machine.  Use saline (saltwater) nasal washes. This can help keep your nasal passages open and wash out mucus and bacteria. You can buy saline nose drops at a grocery store or drugstore. Or you can make your own at home by adding 1 teaspoon of salt and 1 teaspoon of baking soda to 2 cups of distilled water. If you make your own, fill a bulb syringe with the solution, insert the tip into your nostril, and squeeze gently. Candace Mayonas your nose.    Put a hot, wet towel or a warm gel pack on your face 3 or 4 times a day for 5 to 10 minutes each time.  Try a decongestant nasal spray like oxymetazoline (Afrin). Do not use it for more than 3 days in a row. Using it for more than 3 days can make your congestion worse. When should you call for help? Call your doctor now or seek immediate medical care if:     You have new or worse swelling or redness in your face or around your eyes.      You have a new or higher fever. Watch closely for changes in your health, and be sure to contact your doctor if:     You have new or worse facial pain.      The mucus from your nose becomes thicker (like pus) or has new blood in it.      You are not getting better as expected. Where can you learn more? Go to https://VisedopeStyleSainteweb.[employer]. org and sign in to your [employer] account. Enter N727 in the [employer] box to learn more about \"Sinusitis: Care Instructions. \"     If you do not have an account, please click on the \"Sign Up Now\" link. Current as of: September 8, 2021               Content Version: 13.2  © 2006-2022 [employer]. Care instructions adapted under license by [facility] ([facility]). If you have questions about a medical condition or this instruction, always ask your healthcare professional. [address] any warranty or liability for your use of this information. Patient Education        Saline Nasal Washes: Care Instructions  Overview     Saline nasal washes help keep the nasal passages open by washing out thick or dried mucus. This simple remedy can help relieve symptoms of allergies, sinusitis, and colds. It also can make the nose feel more comfortable by keeping the mucous membranes moist. You may notice a little burning sensation in your nose the first few times you use the solution, but this usually getsbetter in a few days. Follow-up care is a key part of your treatment and safety.  Be sure to make and go to all appointments, and call your doctor if you are having problems. It's also a good idea to know your test results and keep alist of the medicines you take. How can you care for yourself at home?  You can buy premixed saline solution in a squeeze bottle or other sinus rinse products at a drugstore. Read and follow the instructions on the label.  You also can make your own saline solution by adding 1 teaspoon of non-iodized salt and 1 teaspoon of baking soda to 2 cups of distilled or boiled and cooled water.  If you use a homemade solution, use a squeeze bottle or neti pot to get the solution into your nose. Room temperature or slightly warmed water may be more comfortable. Make sure it isn't hot.  Stand over the sink with your head tilted forward and slightly to one side. Put only the tip of the syringe or squeeze bottle into the nostril that is farther away from the sink. (The nostril closest to the sink will drain the fluid.) Gently squirt the solution into the nostril and toward the back of your head with your mouth open. The solution should flow out the other nostril. Repeat on the other side. Some sneezing and gagging are normal at first.   Gently blow your nose.  Clean the syringe or bottle after each use.  Repeat this 2 or 3 times a day.  Use nasal washes gently if you have nosebleeds often. When should you call for help? Watch closely for changes in your health, and be sure to contact your doctor if:     Your symptoms do not get better.      You have problems doing the nasal washes. Where can you learn more? Go to https://MyEduaimeeeweb.Think [employer]time. org and sign in to your [employer] account. Enter [phone] in the [facility] box to learn more about \"Saline Nasal Washes: Care Instructions. \"     If you do not have an account, please click on the \"Sign Up Now\" link.   Current as of: September 8, 2021               Content Version: 13.2  © 1432-0768 [employer]. Care instructions adapted under license by [facility] ([facility]). If you have questions about a medical condition or this instruction, always ask your healthcare professional. [address] any warranty or liability for your use of this information.

## 2022-06-15 NOTE — LETTER
[address] [address] [address] [facility] [address]  Phone: [phone]  Fax: [phone]    LYNNETTE Lea CNP        Sonam 15, 2022     Patient: Susu Mi   YOB: 1995   Date of Visit: 6/15/2022       To Whom It May Concern: It is my medical opinion that Orin Lora may return to work on 6/17/22 as long as fever free without medication. If you have any questions or concerns, please don't hesitate to call.     Sincerely,        LYNNETTE Lea CNP

## 2022-06-17 ENCOUNTER — TELEPHONE (OUTPATIENT)
Dept: FAMILY MEDICINE CLINIC | Age: 27
End: 2022-06-17

## 2022-06-17 DIAGNOSIS — B96.89 ACUTE BACTERIAL SINUSITIS: Primary | ICD-10-CM

## 2022-06-17 DIAGNOSIS — J01.90 ACUTE BACTERIAL SINUSITIS: Primary | ICD-10-CM

## 2022-06-17 RX ORDER — AZITHROMYCIN 250 MG/1
250 TABLET, FILM COATED ORAL SEE ADMIN INSTRUCTIONS
Qty: 6 TABLET | Refills: 0 | Status: SHIPPED | OUTPATIENT
Start: 2022-06-17 | End: 2022-06-22

## 2022-06-17 NOTE — TELEPHONE ENCOUNTER
Per CB note, symptoms started on 6/14. I don't see where she got tested for COVID-19, but if she indeed tested positive for COVID-19, she would need to quarantine until 6/19 anyway. See if she was tested and when she tested positive.

## 2022-06-17 NOTE — LETTER
Adam[address] Gyn[address]  [address] [facility]  [address] [place] [address]  Phone: [phone]  Fax: [phone]    Sary Parada C.N.P.        June 17, 2022     Patient: Dania Hood   YOB: 1995   Date of Visit: 6/15/22       To Whom it May Concern:    Celso Cabral was seen in my clinic on 6/15/22. She may return to work on 6/20/22. If you have any questions or concerns, please don't hesitate to call.     Sincerely,         Sary Parada C.N.P.

## 2022-06-17 NOTE — TELEPHONE ENCOUNTER
CALLED AND SPOKE TO PATIENT. SHE STATES SHE WAS TOLD AT HER VIRTUAL VISIT TO TEST TOMORROW WHICH WOULD BE THE 4TH DAY OF HER SYMPTOMS. SHE WAS TOLD TO TEST 4 DAYS AFTER SYMPTOMS STARTED. SHE IS ALSO ASKING THAT WORK NOTE BE EMAILED TO HER.  SC

## 2022-06-17 NOTE — TELEPHONE ENCOUNTER
----- Message from Ronald Bullock sent at 6/17/2022  9:22 AM EDT -----  Subject: Message to Provider    QUESTIONS  Information for Provider? Pt said she needs more time off work due to   covid. She works outside and her cough is not getting better, she needs a   note for work ASAP   ---------------------------------------------------------------------------  --------------  [address]  What is the best way for the office to contact you? OK to leave message on   voicemail  Preferred Call Back Phone Number? [phone]  ---------------------------------------------------------------------------  --------------  SCRIPT ANSWERS  Relationship to Patient?  Self

## 2022-06-17 NOTE — TELEPHONE ENCOUNTER
PT IS NEEDING A NOTE FOR WORK, HER BOSS IS THREATENING TO FIRE HER IF THEY DO NOT GET ANY KIND OF ANSWERS AS TO HER BEING ABLE TO COME IN.  SC

## 2022-06-17 NOTE — TELEPHONE ENCOUNTER
Have her test today and let us know results before note. If she doesn't have a home kit, she can come to the office to obtain today.

## 2022-06-17 NOTE — TELEPHONE ENCOUNTER
I CALLED AND SPOKE TO PATIENT. Wanda Barone DR. THIS MORNING AND HE TOLD HER TO TEST 4 DAYS OUT FROM WHEN SYMPTOMS STARTED FOR COVID. JASON STATES PATIENT NEEDS TO BE ABLE TO TEST TODAY IN ORDER FOR US TO GIVE HER A NOTE. PATIENT ASKED WHO SHE SPOKE TO ON CALL THIS AM. GINI GREWAL WAS ON CALL. AT HOME COVID TEST WAS NEGATIVE. IS SHE STILL ABLE TO GET A NOTE FOR WORK? SENDING TO [employer] TO FURTHER ASSIST. PT STATES NO WAY SHE CAN HANDLE GOING TO WORK. SHE IS SCHEDULED TO WORK ON THE [employer] AND WOULD WORK OUT IN THE HEAT. SHE IS C/O BEING BEING UNABLE TO BREATH IN THE HEAT AND SHE FEELS REALLY SHAKING. SHE NEEDS TO KNOW WHAT TO DO SO SHE CAN TELL HER WORK.  SC

## 2022-06-17 NOTE — TELEPHONE ENCOUNTER
Spoke to andrei, ok to give note to cover her to go back on Monday. Patient aware this is being done and emailed to her. She does want to find out if antibiotics are going to be sent in. It looks like in Yoana Gunter note that a Westly Ebbs would be sent after a minimum of 5-7 days of symptoms after ruling out covid.  sc

## 2022-07-21 ENCOUNTER — TELEPHONE (OUTPATIENT)
Dept: FAMILY MEDICINE CLINIC | Age: 27
End: 2022-07-21

## 2022-07-27 ENCOUNTER — TELEPHONE (OUTPATIENT)
Dept: FAMILY MEDICINE CLINIC | Age: 27
End: 2022-07-27

## 2022-07-27 NOTE — TELEPHONE ENCOUNTER
Patient called and was advised. Patient instructed to go to the ER if she felt the symptoms were severe, and to continue OTC medications otherwise to help with symptoms. Patient also given the option of an Inhaler PRN and declined.   AM

## 2022-07-27 NOTE — TELEPHONE ENCOUNTER
Pt started with symptoms on Monday July 18, she tested Friday July 22 and it was Positive. Pt is still not feeling well. She is throwing up, fever,cough, body aches, she said she thinks she has every symptom covid has. She is having difficulty breathing. It has been longer than 5 days and she is still not any better. Can she get an RX for Paxlovid? ?      Wilberto Pinto

## 2022-07-27 NOTE — TELEPHONE ENCOUNTER
Unfortunately Paxlovid will not help this far out. Just needs to run its course. Continue with over-the-counter remedies for symptoms.

## 2022-12-05 ENCOUNTER — PATIENT MESSAGE (OUTPATIENT)
Dept: FAMILY MEDICINE CLINIC | Age: 27
End: 2022-12-05

## 2022-12-05 NOTE — TELEPHONE ENCOUNTER
From: Kati Pickering  To: Dr. Torrez Darian: 12/5/2022 4:06 PM EST  Subject: Covid 19. I have called the office multiple times and no one picks up the phone. I need to get in contact with you. I tested positive for Covid today and I really need to talk to you. If you could call me back Id greatly appreciate it. Thanks. !!!! Its urgent.

## 2022-12-06 ENCOUNTER — TELEMEDICINE (OUTPATIENT)
Dept: FAMILY MEDICINE CLINIC | Age: 27
End: 2022-12-06
Payer: MEDICARE

## 2022-12-06 DIAGNOSIS — G47.01 INSOMNIA DUE TO MEDICAL CONDITION: ICD-10-CM

## 2022-12-06 DIAGNOSIS — U07.1 COVID-19: Primary | ICD-10-CM

## 2022-12-06 PROCEDURE — 4004F PT TOBACCO SCREEN RCVD TLK: CPT

## 2022-12-06 PROCEDURE — G8427 DOCREV CUR MEDS BY ELIG CLIN: HCPCS

## 2022-12-06 PROCEDURE — G8419 CALC BMI OUT NRM PARAM NOF/U: HCPCS

## 2022-12-06 PROCEDURE — 99213 OFFICE O/P EST LOW 20 MIN: CPT

## 2022-12-06 PROCEDURE — G8484 FLU IMMUNIZE NO ADMIN: HCPCS

## 2022-12-06 RX ORDER — LANOLIN ALCOHOL/MO/W.PET/CERES
3 CREAM (GRAM) TOPICAL DAILY
Qty: 30 TABLET | Refills: 3 | Status: SHIPPED | OUTPATIENT
Start: 2022-12-06

## 2022-12-06 RX ORDER — ALBUTEROL SULFATE 90 UG/1
1-2 AEROSOL, METERED RESPIRATORY (INHALATION) EVERY 6 HOURS PRN
Qty: 18 G | Refills: 0 | Status: SHIPPED | OUTPATIENT
Start: 2022-12-06

## 2022-12-06 RX ORDER — METHYLPREDNISOLONE 4 MG/1
TABLET ORAL
Qty: 1 KIT | Refills: 0 | Status: SHIPPED | OUTPATIENT
Start: 2022-12-06 | End: 2022-12-12

## 2022-12-06 SDOH — ECONOMIC STABILITY: FOOD INSECURITY: WITHIN THE PAST 12 MONTHS, THE FOOD YOU BOUGHT JUST DIDN'T LAST AND YOU DIDN'T HAVE MONEY TO GET MORE.: NEVER TRUE

## 2022-12-06 SDOH — ECONOMIC STABILITY: FOOD INSECURITY: WITHIN THE PAST 12 MONTHS, YOU WORRIED THAT YOUR FOOD WOULD RUN OUT BEFORE YOU GOT MONEY TO BUY MORE.: NEVER TRUE

## 2022-12-06 ASSESSMENT — ENCOUNTER SYMPTOMS
EYE ITCHING: 0
EYE DISCHARGE: 0
VOMITING: 0
SINUS PRESSURE: 0
SINUS PAIN: 0
EYE REDNESS: 0
TROUBLE SWALLOWING: 0
CONSTIPATION: 0
DIARRHEA: 0
CHEST TIGHTNESS: 1
RHINORRHEA: 1
ABDOMINAL PAIN: 0
ABDOMINAL DISTENTION: 0
NAUSEA: 0
COUGH: 1
SHORTNESS OF BREATH: 1
WHEEZING: 1
SORE THROAT: 1

## 2022-12-06 ASSESSMENT — SOCIAL DETERMINANTS OF HEALTH (SDOH): HOW HARD IS IT FOR YOU TO PAY FOR THE VERY BASICS LIKE FOOD, HOUSING, MEDICAL CARE, AND HEATING?: NOT HARD AT ALL

## 2022-12-06 NOTE — LETTER
[address] [facility]  [address] [place] [address]  Phone: [phone]  Fax: [phone] [facility], LYNNETTE Pacheco CNP        December 6, 2022     Patient: Ofelia Novak   YOB: 1995   Date of Visit: 12/6/2022       To Whom It May Concern: It is my medical opinion that Aura Salines should remain out of work until 12/12/2022 given initial symptoms on 12/02/2022 without improvement by day 5. Per CDC, quarantine recommendation is extend to 10 days. If you have any questions or concerns, please don't hesitate to call.     Sincerely,        LYNNETTE Carranza CNP

## 2023-02-14 ENCOUNTER — HOSPITAL ENCOUNTER (EMERGENCY)
Age: 28
Discharge: HOME | End: 2023-02-14
Payer: COMMERCIAL

## 2023-02-14 VITALS
WEIGHT: 182.98 LBS | SYSTOLIC BLOOD PRESSURE: 125 MMHG | HEIGHT: 64 IN | BODY MASS INDEX: 31.24 KG/M2 | DIASTOLIC BLOOD PRESSURE: 90 MMHG | RESPIRATION RATE: 16 BRPM | HEART RATE: 63 BPM | OXYGEN SATURATION: 99 % | TEMPERATURE: 97.3 F

## 2023-02-14 DIAGNOSIS — K29.00 ACUTE GASTRITIS WITHOUT HEMORRHAGE, UNSPECIFIED GASTRITIS TYPE: Primary | ICD-10-CM

## 2023-02-14 LAB
A/G RATIO: 1.7 (ref 1.1–2.2)
ALBUMIN SERPL-MCNC: 4.5 G/DL (ref 3.4–5)
ALP BLD-CCNC: 52 U/L (ref 40–129)
ALT SERPL-CCNC: 17 U/L (ref 10–40)
ANION GAP SERPL CALCULATED.3IONS-SCNC: 10 MMOL/L (ref 3–16)
AST SERPL-CCNC: 14 U/L (ref 15–37)
BASOPHILS ABSOLUTE: 0.1 K/UL (ref 0–0.2)
BASOPHILS RELATIVE PERCENT: 0.6 %
BILIRUB SERPL-MCNC: 0.4 MG/DL (ref 0–1)
BILIRUBIN URINE: NEGATIVE
BLOOD, URINE: NEGATIVE
BUN BLDV-MCNC: 6 MG/DL (ref 7–20)
CALCIUM SERPL-MCNC: 10 MG/DL (ref 8.3–10.6)
CHLORIDE BLD-SCNC: 109 MMOL/L (ref 99–110)
CLARITY: CLEAR
CO2: 24 MMOL/L (ref 21–32)
COLOR: YELLOW
CREAT SERPL-MCNC: 0.7 MG/DL (ref 0.6–1.1)
EOSINOPHILS ABSOLUTE: 0.1 K/UL (ref 0–0.6)
EOSINOPHILS RELATIVE PERCENT: 1.3 %
ETHANOL: NORMAL MG/DL (ref 0–0.08)
GFR SERPL CREATININE-BSD FRML MDRD: >60 ML/MIN/{1.73_M2}
GLUCOSE BLD-MCNC: 107 MG/DL (ref 70–99)
GLUCOSE URINE: NEGATIVE MG/DL
HCG QUALITATIVE: NEGATIVE
HCT VFR BLD CALC: 42.9 % (ref 36–48)
HEMOGLOBIN: 14.1 G/DL (ref 12–16)
KETONES, URINE: NEGATIVE MG/DL
LEUKOCYTE ESTERASE, URINE: NEGATIVE
LIPASE: 23 U/L (ref 13–60)
LYMPHOCYTES ABSOLUTE: 1.3 K/UL (ref 1–5.1)
LYMPHOCYTES RELATIVE PERCENT: 14.3 %
MCH RBC QN AUTO: 29.9 PG (ref 26–34)
MCHC RBC AUTO-ENTMCNC: 32.8 G/DL (ref 31–36)
MCV RBC AUTO: 91.2 FL (ref 80–100)
MICROSCOPIC EXAMINATION: NORMAL
MONOCYTES ABSOLUTE: 0.6 K/UL (ref 0–1.3)
MONOCYTES RELATIVE PERCENT: 6.8 %
NEUTROPHILS ABSOLUTE: 7 K/UL (ref 1.7–7.7)
NEUTROPHILS RELATIVE PERCENT: 77 %
NITRITE, URINE: NEGATIVE
PDW BLD-RTO: 13.3 % (ref 12.4–15.4)
PH UA: 7.5 (ref 5–8)
PLATELET # BLD: 269 K/UL (ref 135–450)
PMV BLD AUTO: 8.6 FL (ref 5–10.5)
POTASSIUM REFLEX MAGNESIUM: 3.8 MMOL/L (ref 3.5–5.1)
PROTEIN UA: NEGATIVE MG/DL
RBC # BLD: 4.71 M/UL (ref 4–5.2)
SODIUM BLD-SCNC: 143 MMOL/L (ref 136–145)
SPECIFIC GRAVITY UA: 1 (ref 1–1.03)
TOTAL PROTEIN: 7.1 G/DL (ref 6.4–8.2)
URINE REFLEX TO CULTURE: NORMAL
URINE TYPE: NORMAL
UROBILINOGEN, URINE: 0.2 E.U./DL
WBC # BLD: 9 K/UL (ref 4–11)

## 2023-02-14 PROCEDURE — 82077 ASSAY SPEC XCP UR&BREATH IA: CPT

## 2023-02-14 PROCEDURE — 36415 COLL VENOUS BLD VENIPUNCTURE: CPT

## 2023-02-14 PROCEDURE — 83690 ASSAY OF LIPASE: CPT

## 2023-02-14 PROCEDURE — 85025 COMPLETE CBC W/AUTO DIFF WBC: CPT

## 2023-02-14 PROCEDURE — 99283 EMERGENCY DEPT VISIT LOW MDM: CPT

## 2023-02-14 PROCEDURE — 81003 URINALYSIS AUTO W/O SCOPE: CPT

## 2023-02-14 PROCEDURE — 84703 CHORIONIC GONADOTROPIN ASSAY: CPT

## 2023-02-14 PROCEDURE — 6370000000 HC RX 637 (ALT 250 FOR IP): Performed by: PHYSICIAN ASSISTANT

## 2023-02-14 PROCEDURE — 80053 COMPREHEN METABOLIC PANEL: CPT

## 2023-02-14 RX ORDER — ONDANSETRON 4 MG/1
4 TABLET, ORALLY DISINTEGRATING ORAL ONCE
Status: COMPLETED | OUTPATIENT
Start: 2023-02-14 | End: 2023-02-14

## 2023-02-14 RX ORDER — ONDANSETRON 4 MG/1
4 TABLET, ORALLY DISINTEGRATING ORAL 3 TIMES DAILY PRN
Qty: 10 TABLET | Refills: 0 | Status: SHIPPED | OUTPATIENT
Start: 2023-02-14

## 2023-02-14 RX ADMIN — ONDANSETRON 4 MG: 4 TABLET, ORALLY DISINTEGRATING ORAL at 11:44

## 2023-02-14 ASSESSMENT — PAIN - FUNCTIONAL ASSESSMENT
PAIN_FUNCTIONAL_ASSESSMENT: NONE - DENIES PAIN
PAIN_FUNCTIONAL_ASSESSMENT: 0-10

## 2023-02-14 ASSESSMENT — PAIN SCALES - GENERAL: PAINLEVEL_OUTOF10: 3

## 2023-02-14 NOTE — ED NOTES
Discharge and education instructions reviewed. Patient verbalized understanding, teach-back successful. Patient denied questions at this time. No acute distress noted. Patient instructed to follow-up as noted - return to emergency department if symptoms worsen. Patient verbalized understanding. Discharged per EDMD with discharged instructions.      Whitley Salter RN  02/14/23 2818

## 2023-02-14 NOTE — ED PROVIDER NOTES
[address] [facility]        Pt Name: Maty Lancaster  MRN: [id number]  Cbgfyecenia 1995  Date of evaluation: 2/14/2023  Provider: LOVELY Velazquez  PCP: Hallie Carreon MD  Note Started: 11:30 AM EST     The ED Attending Physician was available for consultation but did not see or evaluate this patient. CHIEF COMPLAINT       Chief Complaint   Patient presents with    Emesis     Pt arrives with c/o emesis that began at 0300. Denies diarrhea       HISTORY OF PRESENT ILLNESS   (Location, Timing/Onset, Context/Setting, Quality, Duration, Modifying Factors, Severity, Associated Signs and Symptoms)  Note limiting factors. Maty Lancaster is a 32 y.o. female who presents with complaint of nausea and vomiting. She says she ate dinner last night at [employer] and it just did not taste right, then afterward she was celebrating with her  and drank 3 shots of tequila. She says around 3:00 AM she began getting nauseous and vomiting, and she has been vomiting persistently since then. Has not kept any food down since then. She denies significant abdominal pain, however. No bowel movement since symptoms started. Says she does not get symptoms like this often. Drinks alcohol only occasionally. Says she is having some menstrual bleeding presently but no cramping and does not usually vomit with menstrual cramps. No urinary complaints. Denies shortness of breath, chest pain, cough, fever. Nursing Notes were all reviewed and agreed with or any disagreements were addressed in the HPI. REVIEW OF SYSTEMS    (2-9 systems for level 4, 10 or more for level 5)     Positives and pertinent negatives as per HPI.      PAST MEDICAL HISTORY     Past Medical History:   Diagnosis Date    Anxiety 02/2018    Depressive disorder 2/2/2018    History of pulmonary embolism 02/2017    implanted birth control? saw hematologist @ [facility]    Hx of blood clots     Hypertension     Migraines     Psychosis ([facility] [address] 2018    Smoker 2012       SURGICAL HISTORY     Past Surgical History:   Procedure Laterality Date     SECTION  11/28/2011    X2, 2017 and 2011     SECTION  2017    CHOLECYSTECTOMY, LAPAROSCOPIC  2017    ROBOTIC XI ASSISTED LAPAROSCOPIC CHOLECYSTECTOMY WITH CHOLANGIOGRAM    LAPAROSCOPY N/A 2021    ROBOTIC ASSISTED RESECTION OF ENDOMETRIOSIS - CO2 LASER performed by Radha Willis MD at [address] N/A 2021    EGD DIAGNOSTIC ONLY performed by Opal Mendoza MD at [address] [place] [address]       Discharge Medication List as of 2023 12:56 PM        CONTINUE these medications which have NOT CHANGED    Details   albuterol sulfate HFA (PROVENTIL;VENTOLIN;PROAIR) 108 (90 Base) MCG/ACT inhaler Inhale 1-2 puffs into the lungs every 6 hours as needed for Wheezing or Shortness of Breath, Disp-18 g, R-0Normal      Spacer/Aero-Holding Chambers GILLIAN ONCE Starting 2022, For 1 dose, Disp-1 each, R-0, Normal      melatonin (RA MELATONIN) 3 MG TABS tablet Take 1 tablet by mouth daily, Disp-30 tablet, R-3Normal      flurbiprofen (ANSAID) 100 MG tablet Take 1 tablet by mouth 2 times daily W/ food, Disp-20 tablet, R-0Normal      pantoprazole (PROTONIX) 20 MG tablet Take 1 tablet by mouth daily, Disp-30 tablet, R-3Print      metoclopramide (REGLAN) 10 MG tablet Take 1 tablet by mouth 3 times daily (with meals), Disp-120 tablet, R-3Print      acetaminophen (TYLENOL) 500 MG tablet Take 1 tablet by mouth 4 times daily as needed for Pain, Disp-120 tablet, R-0Print      docusate sodium (COLACE) 100 MG capsule Take 1 capsule by mouth daily as needed for Constipation, Disp-60 capsule, R-1Print      amphetamine-dextroamphetamine (ADDERALL XR) 10 MG extended release capsule Take 10 mg by mouth 2 times daily.  Historical Med      clonazePAM (KLONOPIN) 2 MG tablet Take 2 mg by mouth 3 times daily as needed. Historical Med      propranolol (INDERAL) 20 MG tablet Take 20 mg by mouth daily Historical Med      lithium 300 MG capsule Take 1 capsule by mouth 4 times daily Historical Med      dicyclomine (BENTYL) 10 MG capsule Take 1 capsule by mouth every 6 hours as needed (cramps), Disp-20 capsule, R-0Print             ALLERGIES     Etonogestrel, Toradol [ketorolac tromethamine], Morphine, and Naprosyn [naproxen]    FAMILYHISTORY       Family History   Problem Relation Age of Onset    Hypertension Father     Hypertension Mother     Bipolar Disorder Mother     Anxiety Disorder Mother     Depression Mother     Depression Sister     Depression Brother         SOCIAL HISTORY       Social History     Tobacco Use    Smoking status: Every Day     Packs/day: 1.00     Years: 7.00     Pack years: 7.00     Types: Cigarettes, E-Cigarettes    Smokeless tobacco: Never    Tobacco comments:     off and on since Feb   Vaping Use    Vaping Use: Every day    Substances: Flavoring    Devices: Disposable   Substance Use Topics    Alcohol use: Yes     Comment: socially    Drug use: Never       SCREENINGS    [place] Coma Scale  Eye Opening: Spontaneous  Best Verbal Response: Oriented  Best Motor Response: Obeys commands  Nabor Coma Scale Score: 15      PHYSICAL EXAM    (up to 7 for level 4, 8 or more for level 5)     ED Triage Vitals [02/14/23 1120]   BP Temp Temp Source Heart Rate Resp SpO2 Height Weight   (!) 141/92 97.3 °F (36.3 °C) Oral 84 16 99 % 5' 4\" (1.626 m) 182 lb 15.7 oz (83 kg)       Physical Exam  Vitals and nursing note reviewed. Constitutional:       General: She is not in acute distress. Appearance: Normal appearance. She is not ill-appearing. HENT:      Head: Normocephalic and atraumatic. Nose: Nose normal.   Eyes:      General:         Right eye: No discharge. Left eye: No discharge. Cardiovascular:      Rate and Rhythm: Normal rate and regular rhythm. Heart sounds: Normal heart sounds. Pulmonary:      Effort: Pulmonary effort is normal. No respiratory distress. Breath sounds: Normal breath sounds. No stridor. No wheezing, rhonchi or rales. Abdominal:      General: Bowel sounds are normal. There is no distension. Palpations: Abdomen is soft. There is no mass. Tenderness: There is no abdominal tenderness. There is no guarding or rebound. Musculoskeletal:         General: Normal range of motion. Cervical back: Normal range of motion. Skin:     General: Skin is warm and dry. Neurological:      General: No focal deficit present. Mental Status: She is alert and oriented to person, place, and time. Psychiatric:         Mood and Affect: Mood normal.         Behavior: Behavior normal.       DIAGNOSTIC RESULTS   LABS:    Labs Reviewed   COMPREHENSIVE METABOLIC PANEL W/ REFLEX TO MG FOR LOW K - Abnormal; Notable for the following components:       Result Value    Glucose 107 (*)     BUN 6 (*)     AST 14 (*)     All other components within normal limits   CBC WITH AUTO DIFFERENTIAL   URINALYSIS WITH REFLEX TO CULTURE   ETHANOL   LIPASE   HCG, SERUM, QUALITATIVE       When ordered only abnormal lab results are displayed. All other labs were within normal range or not returned as of this dictation. EKG: When ordered, EKG's are interpreted by the Emergency Department Physician in the absence of a cardiologist.  Please see their note for interpretation of EKG. RADIOLOGY:   All images such as plain radiographs, CT, Ultrasound and MRI are interpreted by a radiologist. Some images are visualized and preliminarily interpreted by me and/or the ED attending physician. Interpretation per the radiologist below, if available at the time of this note:    No orders to display       CONSULTS:  None    PROCEDURES   Unless otherwise noted below, none.      Procedures    EMERGENCY DEPARTMENT COURSE and DIFFERENTIAL DIAGNOSIS/MDM:   Vitals: Vitals:    02/14/23 1120 02/14/23 1302   BP: (!) 141/92 (!) 125/90   Pulse: 84 63   Resp: 16 16   Temp: 97.3 °F (36.3 °C)    TempSrc: Oral    SpO2: 99% 99%   Weight: 182 lb 15.7 oz (83 kg)    Height: 5' 4\" (1.626 m)        Patient was given the following medications:  Medications   ondansetron (ZOFRAN-ODT) disintegrating tablet 4 mg (4 mg Oral Given 2/14/23 1144)           Is this patient to be included in the SEP-1 Core Measure due to severe sepsis or septic shock? No   Exclusion criteria - the patient is NOT to be included for SEP-1 Core Measure due to:  Viral etiology found or highly suspected (including COVID-19) without concomitant bacterial infection    Given normal vital signs and a normal physical exam.  No significant tenderness to palpation of the abdomen or complaint of significant abdominal pain. Urinalysis and basic blood testing both very reassuring. Not significantly dehydrated. She was given Zofran ODT and was able to drink liquids, had no episodes of vomiting here in the ED. No indication for abdominal imaging or hospitalization. Patient likely has gastritis and will be discharged with prescription for nausea medication and advised to follow-up with primary care if symptoms persist.  The patient verbalized understanding and agreement with this plan of care. The patient was advised to return to the emergency department if symptoms should significantly worsen or if new and concerning symptoms should appear. I estimate there is LOW risk for ACUTE APPENDICITIS, BOWEL OBSTRUCTION, CHOLECYSTITIS, DIVERTICULITIS, INCARCERATED HERNIA, PANCREATITIS, PERITONITIS, PELVIC INFLAMMATORY DISEASE, OVARIAN TORSION, PERFORATED BOWEL, BOWEL ISCHEMIA, CARDIAC ISCHEMIA, ECTOPIC PREGNANCY, TUBO-OVARIAN ABSCESS, or SEPSIS, thus I consider the discharge disposition reasonable. CRITICAL CARE TIME   None    FINAL IMPRESSION      1.  Acute gastritis without hemorrhage, unspecified gastritis type DISPOSITION/PLAN   DISPOSITION Decision To Discharge 02/14/2023 12:54:14 PM      PATIENT REFERRED TO:  Marlene Whittaker [address]  [phone]    Call   As needed, For follow-up care    DISCHARGE MEDICATIONS:  Discharge Medication List as of 2/14/2023 12:56 PM        START taking these medications    Details   ondansetron (ZOFRAN-ODT) 4 MG disintegrating tablet Take 1 tablet by mouth 3 times daily as needed for Nausea or Vomiting, Disp-10 tablet, R-0Normal             DISCONTINUED MEDICATIONS:  Discharge Medication List as of 2/14/2023 12:56 PM               (Please note that portions of this note were completed with a voice recognition program.  Efforts were made to edit the dictations but occasionally words are mis-transcribed.)    LOVELY Gonzales (electronically signed)        Trish Pierre, [address]  02/14/23 3714

## 2023-02-15 ENCOUNTER — TELEMEDICINE (OUTPATIENT)
Dept: FAMILY MEDICINE CLINIC | Age: 28
End: 2023-02-15
Payer: COMMERCIAL

## 2023-02-15 DIAGNOSIS — Z09 HOSPITAL DISCHARGE FOLLOW-UP: Primary | ICD-10-CM

## 2023-02-15 DIAGNOSIS — R03.0 ELEVATED BLOOD PRESSURE READING WITHOUT DIAGNOSIS OF HYPERTENSION: ICD-10-CM

## 2023-02-15 DIAGNOSIS — R11.2 NAUSEA AND VOMITING, UNSPECIFIED VOMITING TYPE: ICD-10-CM

## 2023-02-15 DIAGNOSIS — G47.01 INSOMNIA DUE TO MEDICAL CONDITION: ICD-10-CM

## 2023-02-15 DIAGNOSIS — K29.00 ACUTE GASTRITIS WITHOUT HEMORRHAGE, UNSPECIFIED GASTRITIS TYPE: ICD-10-CM

## 2023-02-15 DIAGNOSIS — F31.81 BIPOLAR 2 DISORDER (HCC): ICD-10-CM

## 2023-02-15 DIAGNOSIS — F12.20 CANNABIS USE DISORDER, MODERATE, DEPENDENCE (HCC): ICD-10-CM

## 2023-02-15 PROCEDURE — 99214 OFFICE O/P EST MOD 30 MIN: CPT | Performed by: NURSE PRACTITIONER

## 2023-02-15 RX ORDER — LANOLIN ALCOHOL/MO/W.PET/CERES
3 CREAM (GRAM) TOPICAL DAILY
Qty: 30 TABLET | Refills: 3 | Status: SHIPPED | OUTPATIENT
Start: 2023-02-15

## 2023-02-15 RX ORDER — PROMETHAZINE HYDROCHLORIDE 25 MG/1
25 TABLET ORAL EVERY 6 HOURS PRN
Qty: 20 TABLET | Refills: 0 | Status: SHIPPED | OUTPATIENT
Start: 2023-02-15 | End: 2023-02-22

## 2023-02-15 SDOH — ECONOMIC STABILITY: FOOD INSECURITY: WITHIN THE PAST 12 MONTHS, THE FOOD YOU BOUGHT JUST DIDN'T LAST AND YOU DIDN'T HAVE MONEY TO GET MORE.: NEVER TRUE

## 2023-02-15 SDOH — ECONOMIC STABILITY: INCOME INSECURITY: HOW HARD IS IT FOR YOU TO PAY FOR THE VERY BASICS LIKE FOOD, HOUSING, MEDICAL CARE, AND HEATING?: NOT HARD AT ALL

## 2023-02-15 SDOH — ECONOMIC STABILITY: FOOD INSECURITY: WITHIN THE PAST 12 MONTHS, YOU WORRIED THAT YOUR FOOD WOULD RUN OUT BEFORE YOU GOT MONEY TO BUY MORE.: NEVER TRUE

## 2023-02-15 SDOH — ECONOMIC STABILITY: HOUSING INSECURITY
IN THE LAST 12 MONTHS, WAS THERE A TIME WHEN YOU DID NOT HAVE A STEADY PLACE TO SLEEP OR SLEPT IN A SHELTER (INCLUDING NOW)?: NO

## 2023-02-15 ASSESSMENT — ENCOUNTER SYMPTOMS
BACK PAIN: 0
DIARRHEA: 0
ABDOMINAL DISTENTION: 0
SINUS PAIN: 0
VOMITING: 1
COLOR CHANGE: 0
CHEST TIGHTNESS: 0
SHORTNESS OF BREATH: 0
EYE DISCHARGE: 0
NAUSEA: 1
COUGH: 0
ABDOMINAL PAIN: 0
CONSTIPATION: 0
SINUS PRESSURE: 0

## 2023-02-15 ASSESSMENT — PATIENT HEALTH QUESTIONNAIRE - PHQ9
7. TROUBLE CONCENTRATING ON THINGS, SUCH AS READING THE NEWSPAPER OR WATCHING TELEVISION: 0
3. TROUBLE FALLING OR STAYING ASLEEP: 1
SUM OF ALL RESPONSES TO PHQ QUESTIONS 1-9: 4
9. THOUGHTS THAT YOU WOULD BE BETTER OFF DEAD, OR OF HURTING YOURSELF: 0
SUM OF ALL RESPONSES TO PHQ QUESTIONS 1-9: 4
6. FEELING BAD ABOUT YOURSELF - OR THAT YOU ARE A FAILURE OR HAVE LET YOURSELF OR YOUR FAMILY DOWN: 0
SUM OF ALL RESPONSES TO PHQ QUESTIONS 1-9: 4
SUM OF ALL RESPONSES TO PHQ QUESTIONS 1-9: 4
SUM OF ALL RESPONSES TO PHQ9 QUESTIONS 1 & 2: 1
10. IF YOU CHECKED OFF ANY PROBLEMS, HOW DIFFICULT HAVE THESE PROBLEMS MADE IT FOR YOU TO DO YOUR WORK, TAKE CARE OF THINGS AT HOME, OR GET ALONG WITH OTHER PEOPLE: 0
2. FEELING DOWN, DEPRESSED OR HOPELESS: 1
5. POOR APPETITE OR OVEREATING: 1
8. MOVING OR SPEAKING SO SLOWLY THAT OTHER PEOPLE COULD HAVE NOTICED. OR THE OPPOSITE, BEING SO FIGETY OR RESTLESS THAT YOU HAVE BEEN MOVING AROUND A LOT MORE THAN USUAL: 0
1. LITTLE INTEREST OR PLEASURE IN DOING THINGS: 0
4. FEELING TIRED OR HAVING LITTLE ENERGY: 1

## 2023-02-15 NOTE — PROGRESS NOTES
Drea Ramirez (:  1995) is a 32 y.o. female,Established patient, here for evaluation of the following chief complaint(s): ED Follow-up (High BP, ER yesterday)      Drea Ramirez, was evaluated through a synchronous (real-time) audio-video encounter. The patient (or guardian if applicable) is aware that this is a billable service, which includes applicable co-pays. This Virtual Visit was conducted with patient's (and/or legal guardian's) consent. The visit was conducted pursuant to the emergency declaration under the [address] [place] [address], [address] [place] [address] authority and the [employer] Act. Patient identification was verified, and a caregiver was present when appropriate. The patient was located at home in a state where the provider was licensed to provide care. Patient identification was verified at the start of the visit: Yes    ASSESSMENT/PLAN:  1. Hospital discharge follow-up   Discussed ER visit with pt and what would warrant a return to the ER   Pt verbalized understanding   Pt says she has not voided today but a few drops and she has been awake over 12 hours, concern for dehydration  2. Nausea and vomiting, unspecified vomiting type  -     promethazine (PHENERGAN) 25 MG tablet; Take 1 tablet by mouth every 6 hours as needed for Nausea WARNING:  May cause drowsiness. May impair ability to operate vehicles or machinery. Do not use in combination with alcohol., Disp-20 tablet, R-0Normal   Stop zofran, try phenergan, discussed the sedation this can cause, do not drive with this   Discussed goal of PO rehydration  3. Acute gastritis without hemorrhage, unspecified gastritis type  -     promethazine (PHENERGAN) 25 MG tablet; Take 1 tablet by mouth every 6 hours as needed for Nausea WARNING:  May cause drowsiness. May impair ability to operate vehicles or machinery.   Do not use in combination with alcohol., Disp-20 tablet, R-0Normal   Pt likely has GI illness, discussed PO rehydration, pt struggling to keep any fluids down, not voiding  4. Elevated blood pressure reading without diagnosis of hypertension   Pt wants to discuss elevated BP but discussed with pt we need an in person visit when she is not ill so we can personally assess her BP and decide if medication is necessary   Pt frustrated but verbalized understanding   In the meantime, encouraged lifestyle modifcation    Follow a low sodium diet   Physical activity 150 minutes weekly recommended    Weight loss, initial goal 10% of body weight recommended  5. Insomnia due to medical condition  -     melatonin (RA MELATONIN) 3 MG TABS tablet; Take 1 tablet by mouth daily, Disp-30 tablet, R-3Normal   Pt requested refill on her melatonin  6. Bipolar 2 disorder ([facility]   Pt follows with psychiatry   On klonopin and adderall and mood stabilizers   Encouraged counseling/self care  7. Cannabis use disorder, moderate, dependence ([facility]   Discouraged use   No medical marijuana use from dispensary on file, likely illegal use      Return in about 1 month (around 3/15/2023) for Physical Exam and Fasting Labs, HTN Follow Up. SUBJECTIVE/OBJECTIVE:  HPI    Chief Complaint   Patient presents with    ED Follow-up     High BP, ER yesterday     ER Follow Up  Pt was told today's visit needed to be in person since it was a BP follow up. It was scheduled as an in person visit and then at the time of the visit, she called the office and said, she could only do it as virtual. BP was 141/90 and 125/92 in the ER. Pt says she is still having a lot of nausea and vomiting and fatigue. She is still having ringing in her ears. Zofran they gave her is not working which is not working this time. Says her neighbor let her check her BP with her machine and BP at home is 165/90. Patient ate at [facility] [employer] on Monday (2 nights ago)- thought the chili tasted odd, and had alcohol that night tequila as well.  No other sick contacts. Pt not on reglan anymore, previously on med list. Says she can't take care of her kids because she cannot function because of how ill she feels. Pt is not keeping any fluids down. Voided a very small amount, \"just drops,\" earlier today. HR 89 a couple hours ago. Has been feeling dizzy. Has a lot of family stress going on right now. Attempted to drink every 15 minutes but keeps vomiting. Says she had a good BM yesterday. Thinks she has had elevated BP even before getting sick. Review of Systems   Constitutional:  Negative for activity change, appetite change, fatigue, fever and unexpected weight change. HENT:  Negative for congestion, ear pain, sinus pressure and sinus pain. Eyes:  Negative for discharge and visual disturbance. Respiratory:  Negative for cough, chest tightness and shortness of breath. Cardiovascular:  Negative for chest pain, palpitations and leg swelling. Gastrointestinal:  Positive for nausea and vomiting. Negative for abdominal distention, abdominal pain, constipation and diarrhea. Endocrine: Negative for cold intolerance, heat intolerance, polydipsia, polyphagia and polyuria. Genitourinary:  Negative for decreased urine volume, difficulty urinating, dysuria, flank pain, frequency and urgency. Musculoskeletal:  Negative for arthralgias, back pain, gait problem, joint swelling, myalgias and neck pain. Skin:  Negative for color change, rash and wound. Allergic/Immunologic: Negative for food allergies and immunocompromised state. Neurological:  Negative for dizziness, tremors, speech difficulty, weakness, light-headedness, numbness and headaches. Hematological:  Negative for adenopathy. Does not bruise/bleed easily. Psychiatric/Behavioral:  Negative for confusion, decreased concentration, self-injury, sleep disturbance and suicidal ideas. The patient is not nervous/anxious.       Patient-Reported Vitals 6/15/2022   Patient-Reported Weight 171LBS Patient-Reported Height 5'5\"   Patient-Reported Systolic 734   Patient-Reported Diastolic 89            [INSTRUCTIONS:  \"[x]\" Indicates a positive item  \"[]\" Indicates a negative item  -- DELETE ALL ITEMS NOT EXAMINED]    Constitutional: [x] Appears well-developed and well-nourished [x] No apparent distress      [] Abnormal -     Mental status: [x] Alert and awake  [x] Oriented to person/place/time [x] Able to follow commands    [] Abnormal -     Eyes:   EOM    [x]  Normal    [] Abnormal -   Sclera  [x]  Normal    [] Abnormal -          Discharge [x]  None visible   [] Abnormal -     HENT: [x] Normocephalic, atraumatic  [] Abnormal -   [x] Mouth/Throat: Mucous membranes are moist    External Ears [x] Normal  [] Abnormal -    Neck: [x] No visualized mass [] Abnormal -     Pulmonary/Chest: [x] Respiratory effort normal   [x] No visualized signs of difficulty breathing or respiratory distress        [] Abnormal -      Musculoskeletal:   [x] Normal gait with no signs of ataxia         [x] Normal range of motion of neck        [] Abnormal -     Neurological:        [x] No Facial Asymmetry (Cranial nerve 7 motor function) (limited exam due to video visit)          [x] No gaze palsy        [] Abnormal -          Skin:        [x] No significant exanthematous lesions or discoloration noted on facial skin         [] Abnormal -            Psychiatric:       [x] Normal Affect [] Abnormal -        [x] No Hallucinations    Other pertinent observable physical exam findings:-      On this date 2/15/2023 I have spent 33 minutes reviewing previous notes, test results and face to face (virtual) with the patient discussing the diagnosis and importance of compliance with the treatment plan as well as documenting on the day of the visit.     Care Gaps Addressed  COVID vaccine recommended  PNA vaccine recommended  Hep C screen recommended with next blood draw   PAP smear recommended  Flu vaccine recommended   PHQ UTD 2023      I have reviewed patient's pertinent medical history, relevant laboratory and imaging studies, and past/future health maintenance. Discussed with the patient the importance of adhering to their current medication regimen as directed. Advised the patient that they should continue to work on eating a healthy balanced diet and staying active by exercising within their personal limits. Orders as listed above. Patient was advised to keep future appointments with their respective specialty care team(s). Patient had the opportunity to ask questions, all of which were answered to the best of my ability and with patient satisfaction. Patient understands and is agreeable with the care plan following today's visit. Patient is to schedule an appointment for any new or worsening symptoms. Go to ER for significant shortness of breath, chest pain, or uncontrolled pain or fever. I discussed with patient the risk and benefits of any medications that were prescribed today. I verified that the patient understands their medications, labs, and/or procedures. The patient is doing well with current medication regimen and does not have any barriers to adherence. The patient's self-management abilities are good. Follow Up in 1 Months for BP check in person physical and fasting labs    Ansley Snyder is a 32 y.o. female being evaluated by a Virtual Visit (video visit) encounter to address concerns as mentioned above. A caregiver was present when appropriate. Due to this being a TeleHealth encounter (During FELXH-09 public health emergency), evaluation of the following organ systems was limited: Vitals/Constitutional/EENT/Resp/CV/GI//MS/Neuro/Skin/Heme-Lymph-Imm.   Pursuant to the emergency declaration under the [address] [place] [address] authority and the [employer] Act, this Virtual Visit was conducted with patient's (and/or legal guardian's) consent, to reduce the patient's risk of exposure to COVID-19 and provide necessary medical care. The patient (and/or legal guardian) has also been advised to contact this office for worsening conditions or problems, and seek emergency medical treatment and/or call 911 if deemed necessary. Services were provided through a video synchronous discussion virtually to substitute for in-person clinic visit. Patient was located at home and provider was located in office or at home. An electronic signature was used to authenticate this note.     --Yousuf Haines, APRN - CNP

## 2023-03-08 ENCOUNTER — OFFICE VISIT (OUTPATIENT)
Dept: FAMILY MEDICINE CLINIC | Age: 28
End: 2023-03-08
Payer: MEDICAID

## 2023-03-08 VITALS
OXYGEN SATURATION: 96 % | RESPIRATION RATE: 20 BRPM | DIASTOLIC BLOOD PRESSURE: 70 MMHG | SYSTOLIC BLOOD PRESSURE: 120 MMHG | TEMPERATURE: 99.1 F | HEART RATE: 75 BPM | WEIGHT: 187.6 LBS | HEIGHT: 64 IN | BODY MASS INDEX: 32.03 KG/M2

## 2023-03-08 DIAGNOSIS — J40 BRONCHITIS: Primary | ICD-10-CM

## 2023-03-08 DIAGNOSIS — J02.0 STREP THROAT: ICD-10-CM

## 2023-03-08 PROCEDURE — 99213 OFFICE O/P EST LOW 20 MIN: CPT | Performed by: NURSE PRACTITIONER

## 2023-03-08 RX ORDER — PREDNISONE 10 MG/1
TABLET ORAL
Qty: 21 TABLET | Refills: 0 | Status: SHIPPED | OUTPATIENT
Start: 2023-03-08

## 2023-03-08 RX ORDER — AZITHROMYCIN 250 MG/1
TABLET, FILM COATED ORAL
COMMUNITY
Start: 2023-03-05 | End: 2023-03-08

## 2023-03-08 RX ORDER — BROMPHENIRAMINE MALEATE, PSEUDOEPHEDRINE HYDROCHLORIDE, AND DEXTROMETHORPHAN HYDROBROMIDE 2; 30; 10 MG/5ML; MG/5ML; MG/5ML
SYRUP ORAL
COMMUNITY
Start: 2023-03-05

## 2023-03-08 RX ORDER — CEPHALEXIN 500 MG/1
500 CAPSULE ORAL 2 TIMES DAILY
Qty: 20 CAPSULE | Refills: 0 | Status: SHIPPED | OUTPATIENT
Start: 2023-03-08 | End: 2023-03-18

## 2023-03-08 ASSESSMENT — ENCOUNTER SYMPTOMS
CHEST TIGHTNESS: 0
FACIAL SWELLING: 1
NAUSEA: 1
EYE DISCHARGE: 0
SHORTNESS OF BREATH: 0
CONSTIPATION: 0
COLOR CHANGE: 0
COUGH: 1
DIARRHEA: 0
SINUS PRESSURE: 1
ABDOMINAL PAIN: 0
BACK PAIN: 0
SINUS PAIN: 1
ABDOMINAL DISTENTION: 0
SORE THROAT: 1
TROUBLE SWALLOWING: 1
VOMITING: 1

## 2023-03-08 NOTE — PATIENT INSTRUCTIONS
You may receive a survey regarding the care you received during your visit. Your input is valuable to us. We encourage you to complete and return your survey. We hope you will choose us in the future for your healthcare needs. GENERAL OFFICE POLICIES      Telephone Calls: Messages will be answered within 1-2 business days, unless the provider is out of the office. If it is urgent a covering provider will answer. (this does not include Medication refills). MyChart: We recommend all patients sign up for [employer]. Through this portal you can see your lab results, request refills, schedule appointments, pay your bill and send messages to the office. [employer] messages will be answered within 1-2 business days unless the provider is out of the office. For urgent matters, please call the office. Appointments:  All appointments must be scheduled. We ask all patients to schedule their next follow up appointment before they leave the office to make sure you will be able to be seen before you run out of medications. 24 hours notice is required to cancel or reschedule an appointment to avoid being marked as a no show. You may be dismissed from the practice after 3 no shows. LATE for Appointment: If you are 15 or more minutes late for your appointment, you may be asked to reschedule. MA/LAB APPTS: Must be scheduled, cannot accept walk in lab visits. We only draw labs for patients established in our office. We only do injections for medications ordered by our office. Acute Sick Visits:  Nothing other than acute complaint will be addressed at this visit. TRADITIONAL MEDICARE  DOES NOT COVER PHYSICALS  MEDICARE WELLNESS VISITS: These are NOT physicals but the free annual visit offered by Medicare to discuss wellness issues. Medication refills, checkups, etc. will not be addressed during this visit.   Medication Refills: Refills are handled electronically so please contact your pharmacy for medication refills even if current refills have been exhausted. If you are on a controlled medication you will be referred to a specialist (pain specialist, psychiatry, etc). Forms: There is a $35 fee to fill out FMLA/Disability paperwork, payable at time of . Instead of the fee, you can choose to have the paperwork filled out during a separate office visit that is for filling out the paperwork only. Medication Samples: This office does not carry medication samples. If you need assistance in getting your medications, then please let the medical assistant know so they can help you sign up for a drug assistance program that can help get medications at a reduced cost or even free (if you qualify). Workman's Comp Claims: We do not handle workman's comp cases or claims. You will need to go to an urgent care to be seen or to whomever your employer uses.   General - Any abusive/rude behavior toward staff/providers may be cause for dismissal.

## 2023-03-08 NOTE — LETTER
March 8, 2023       Janelle Malcolm YOB: 1995   [address] [facility] Date of Visit:  3/8/2023       To Whom It May Concern: It is my medical opinion that Shy Franco may return to work on 3/10/23 when she has been fever free 24 hours without medication. Patient was seen today for contagious illness, still running a fever and medicated. If you have any questions or concerns, please don't hesitate to call.     Sincerely,        Kyree Vogel, LYNNETTE - CNP

## 2023-03-08 NOTE — PROGRESS NOTES
Date of Service:  3/8/2023    Niya Bobo (:  1995) is a 32 y.o. female, here for evaluation of the following medical concerns:    Chief Complaint   Patient presents with    Other     Pt c/o swollen face, right eye was crusted, nausea, vomiting, sore throat, hurting to talk, was seen at urgent care on 3/5/23 and diagnosed with strep         HPI    Illness  Patient feels very weak and feels like her face is swollen. Says her face overall is very fat compared to normal. She could barely swallow. She is having difficulty with her breathing. She is vomiting up anything she tries taking in. She has been running fevers 103 F. She is struggling to swallow pills. It hurts to talk, really hurts to swallow pills. Her kidneys hurt. She is overall very uncomfortable, fatigued. Right eye feels crusty. Head feels huge and like ears feel clogged. Only has today and tomorrow left of azithromycin. Mom says she is allergic to PCN, eyes swelled up and had to take her to hospital. Has been using son's albuterol inhaler. Review of Systems   Constitutional:  Positive for appetite change, chills, fatigue and fever. Negative for activity change and unexpected weight change. HENT:  Positive for congestion, ear pain, facial swelling, hearing loss, sinus pressure, sinus pain, sore throat and trouble swallowing. Eyes:  Negative for discharge and visual disturbance. Respiratory:  Positive for cough. Negative for chest tightness and shortness of breath. Cardiovascular:  Negative for chest pain, palpitations and leg swelling. Gastrointestinal:  Positive for nausea and vomiting. Negative for abdominal distention, abdominal pain, constipation and diarrhea. Endocrine: Negative for cold intolerance, heat intolerance, polydipsia, polyphagia and polyuria. Genitourinary:  Negative for decreased urine volume, difficulty urinating, dysuria, flank pain, frequency and urgency.    Musculoskeletal:  Negative for arthralgias, back pain, gait problem, joint swelling, myalgias and neck pain. Skin:  Negative for color change, rash and wound. Allergic/Immunologic: Negative for food allergies and immunocompromised state. Neurological:  Positive for weakness. Negative for dizziness, tremors, speech difficulty, light-headedness, numbness and headaches. Hematological:  Negative for adenopathy. Does not bruise/bleed easily. Psychiatric/Behavioral:  Negative for confusion, decreased concentration, self-injury, sleep disturbance and suicidal ideas. The patient is not nervous/anxious. Prior to Visit Medications    Medication Sig Taking?  Authorizing Provider   brompheniramine-pseudoephedrine-DM 2-30-10 MG/5ML syrup  Yes Historical Provider, MD   predniSONE (DELTASONE) 10 MG tablet Take 6 tabs day 1, 5 tabs day 2, 4 tabs day 3, 3 tabs day 4, 2 tabs day 5, 1 tab day 6 Yes LYNNETTE Cote CNP   cephALEXin (KEFLEX) 500 MG capsule Take 1 capsule by mouth 2 times daily for 10 days Yes LYNNETTE Cote CNP   melatonin (RA MELATONIN) 3 MG TABS tablet Take 1 tablet by mouth daily Yes LYNNETTE Cote CNP   ondansetron (ZOFRAN-ODT) 4 MG disintegrating tablet Take 1 tablet by mouth 3 times daily as needed for Nausea or Vomiting Yes LOVELY Burgos   albuterol sulfate HFA (PROVENTIL;VENTOLIN;PROAIR) 108 (90 Base) MCG/ACT inhaler Inhale 1-2 puffs into the lungs every 6 hours as needed for Wheezing or Shortness of Breath Yes LYNNETTE Lei CNP   Spacer/Aero-Holding Wilhelminia Rodriguez 1 Device by Does not apply route once for 1 dose Yes Luke A Glischinski, APRN - CNP   flurbiprofen (ANSAID) 100 MG tablet Take 1 tablet by mouth 2 times daily W/ food Yes Nicole Parham MD   acetaminophen (TYLENOL) 500 MG tablet Take 1 tablet by mouth 4 times daily as needed for Pain Yes Gay Storey MD   docusate sodium (COLACE) 100 MG capsule Take 1 capsule by mouth daily as needed for Constipation Yes Robert Turner MD   amphetamine-dextroamphetamine (ADDERALL XR) 10 MG extended release capsule Take 10 mg by mouth 2 times daily. Yes Historical Provider, MD   clonazePAM (KLONOPIN) 2 MG tablet Take 2 mg by mouth 3 times daily as needed. Yes Historical Provider, MD   propranolol (INDERAL) 20 MG tablet Take 20 mg by mouth daily  Yes Historical Provider, MD   lithium 300 MG capsule Take 1 capsule by mouth 4 times daily  Yes Historical Provider, MD   dicyclomine (BENTYL) 10 MG capsule Take 1 capsule by mouth every 6 hours as needed (cramps) Yes LOVELY Forbes        Social History     Tobacco Use    Smoking status: Former     Packs/day: 1.00     Years: 7.00     Pack years: 7.00     Types: Cigarettes, E-Cigarettes     Quit date: 3/1/2023     Years since quittin.0     Passive exposure: Never    Smokeless tobacco: Never    Tobacco comments:     off and on since Feb   Substance Use Topics    Alcohol use: Yes     Comment: socially        Vitals:    23 1347   BP: 120/70   Site: Right Upper Arm   Position: Sitting   Cuff Size: Medium Adult   Pulse: 75   Resp: 20   Temp: 99.1 °F (37.3 °C)   TempSrc: Oral   SpO2: 96%   Weight: 187 lb 9.6 oz (85.1 kg)  Comment: shoes on   Height: 5' 4\" (1.626 m)     Estimated body mass index is 32.2 kg/m² as calculated from the following:    Height as of this encounter: 5' 4\" (1.626 m). Weight as of this encounter: 187 lb 9.6 oz (85.1 kg). Physical Exam  Vitals reviewed. Constitutional:       General: She is awake. Appearance: Normal appearance. She is well-developed and well-groomed. She is obese. She is ill-appearing. HENT:      Head: Normocephalic and atraumatic. Right Ear: Hearing, ear canal and external ear normal. A middle ear effusion is present. Left Ear: Hearing, ear canal and external ear normal. A middle ear effusion is present. Nose: Nose normal.      Mouth/Throat:      Lips: Pink.       Mouth: Mucous membranes are moist. Pharynx: Oropharynx is clear. Uvula swelling present. Tonsils: Tonsillar exudate present. 2+ on the right. 2+ on the left. Eyes:      General: Lids are normal.      Extraocular Movements: Extraocular movements intact. Conjunctiva/sclera: Conjunctivae normal.      Pupils: Pupils are equal, round, and reactive to light. Neck:      Thyroid: No thyromegaly. Vascular: No carotid bruit. Cardiovascular:      Rate and Rhythm: Normal rate. Pulses:           Carotid pulses are 2+ on the right side and 2+ on the left side. Radial pulses are 2+ on the right side and 2+ on the left side. Posterior tibial pulses are 2+ on the right side and 2+ on the left side. Heart sounds: Normal heart sounds, S1 normal and S2 normal. No murmur heard. Pulmonary:      Effort: Pulmonary effort is normal.      Breath sounds: Examination of the right-upper field reveals wheezing. Examination of the left-upper field reveals wheezing. Examination of the right-middle field reveals wheezing. Examination of the left-middle field reveals wheezing. Examination of the right-lower field reveals wheezing. Examination of the left-lower field reveals wheezing. Wheezing present. Abdominal:      General: Bowel sounds are normal. There is no abdominal bruit. Palpations: Abdomen is soft. Tenderness: There is no abdominal tenderness. Genitourinary:     Comments: Deferred  Musculoskeletal:         General: Normal range of motion. Cervical back: Full passive range of motion without pain, normal range of motion and neck supple. Right lower leg: No edema. Left lower leg: No edema. Lymphadenopathy:      Head:      Right side of head: No submental, submandibular, tonsillar, preauricular, posterior auricular or occipital adenopathy. Left side of head: No submental, submandibular, tonsillar, preauricular, posterior auricular or occipital adenopathy. Cervical: Cervical adenopathy present. Right cervical: Superficial cervical adenopathy, deep cervical adenopathy and posterior cervical adenopathy present. Left cervical: Superficial cervical adenopathy, deep cervical adenopathy and posterior cervical adenopathy present. Upper Body:      Right upper body: No supraclavicular adenopathy. Left upper body: No supraclavicular adenopathy. Skin:     General: Skin is warm and dry. Capillary Refill: Capillary refill takes less than 2 seconds. Neurological:      General: No focal deficit present. Mental Status: She is alert and oriented to person, place, and time. Mental status is at baseline. Sensory: Sensation is intact. Motor: Motor function is intact. Coordination: Coordination is intact. Gait: Gait is intact. Psychiatric:         Attention and Perception: Attention and perception normal.         Mood and Affect: Mood and affect normal.         Speech: Speech normal.         Behavior: Behavior normal. Behavior is cooperative. Thought Content: Thought content normal.         Cognition and Memory: Cognition and memory normal.         Judgment: Judgment normal.       ASSESSMENT/PLAN:  1. Bronchitis  -     predniSONE (DELTASONE) 10 MG tablet; Take 6 tabs day 1, 5 tabs day 2, 4 tabs day 3, 3 tabs day 4, 2 tabs day 5, 1 tab day 6, Disp-21 tablet, R-0Normal   No NSAIDs (ibuprofen, motrin, aleve, advil, naproxen, etc) while on steroid    Discussed steroid for wheezing, has inhaler at home, aware not to share   Discussed when to go to hospital  2. Strep throat  -     cephALEXin (KEFLEX) 500 MG capsule;  Take 1 capsule by mouth 2 times daily for 10 days, Disp-20 capsule, R-0Normal    Take medication in its entirety even if feeling better to avoid a resistance to antibiotics   Change all oral care supplies: toothbrush, toothpaste, mouthwash, for yourself and anyone who shares supplies/castaneda/drawer 24 hours AFTER starting antibiotic to avoid recontamination/reinfection   Do not share drinks or utensils   No school/work for 24 hours, contagious for first 24 hours on antibiotics   No school/work until 24 hours fever free without antipyretic medications (tylenol/motrin)   Tylenol and motrin OTC for pain, motrin often works best for inflammation/pain, and for fever   Push fluids especially water   Warm tea with honey for throat pain   Throat lozenges, popsicles, jello   Information printed and reviewed      Care Gaps Addressed  Acute visit only today    I have reviewed patient's pertinent medical history, relevant laboratory and imaging studies, and past/future health maintenance. Discussed with the patient the importance of adhering to their current medication regimen as directed. Advised the patient that they should continue to work on eating a healthy balanced diet and staying active by exercising within their personal limits. Orders as listed above. Patient was advised to keep future appointments with their respective specialty care team(s). Patient had the opportunity to ask questions, all of which were answered to the best of my ability and with patient satisfaction. Patient understands and is agreeable with the care plan following today's visit. Patient is to schedule an appointment for any new or worsening symptoms. Go to ER for significant shortness of breath, chest pain, or uncontrolled pain or fever. I discussed with patient the risk and benefits of any medications that were prescribed today. I verified that the patient understands their medications, labs, and/or procedures. The patient is doing well with current medication regimen and does not have any barriers to adherence. The patient's self-management abilities are good. Return in about 1 month (around 4/8/2023) for Physical Exam and Fasting Labs, HTN Follow Up. An electronic signature was used to authenticate this note.     --LYNNETTE Lombardi - CNP on 3/8/2023 at 2:15 PM

## 2023-08-02 ENCOUNTER — HOSPITAL ENCOUNTER (EMERGENCY)
Age: 28
Discharge: HOME | End: 2023-08-02
Attending: EMERGENCY MEDICINE
Payer: MEDICAID

## 2023-08-02 ENCOUNTER — APPOINTMENT (OUTPATIENT)
Dept: GENERAL RADIOLOGY | Age: 28
End: 2023-08-02
Payer: MEDICAID

## 2023-08-02 VITALS
HEART RATE: 98 BPM | RESPIRATION RATE: 16 BRPM | DIASTOLIC BLOOD PRESSURE: 87 MMHG | SYSTOLIC BLOOD PRESSURE: 144 MMHG | OXYGEN SATURATION: 99 % | TEMPERATURE: 98.1 F

## 2023-08-02 DIAGNOSIS — S50.02XA CONTUSION OF LEFT ELBOW, INITIAL ENCOUNTER: Primary | ICD-10-CM

## 2023-08-02 PROCEDURE — 73090 X-RAY EXAM OF FOREARM: CPT

## 2023-08-02 PROCEDURE — 99283 EMERGENCY DEPT VISIT LOW MDM: CPT

## 2023-08-02 ASSESSMENT — PAIN DESCRIPTION - DESCRIPTORS: DESCRIPTORS: ACHING

## 2023-08-02 ASSESSMENT — PAIN DESCRIPTION - LOCATION: LOCATION: ARM

## 2023-08-02 ASSESSMENT — PAIN - FUNCTIONAL ASSESSMENT: PAIN_FUNCTIONAL_ASSESSMENT: 0-10

## 2023-08-02 ASSESSMENT — PAIN SCALES - GENERAL: PAINLEVEL_OUTOF10: 10

## 2023-08-02 ASSESSMENT — PAIN DESCRIPTION - FREQUENCY: FREQUENCY: CONTINUOUS

## 2023-08-02 ASSESSMENT — PAIN DESCRIPTION - ORIENTATION: ORIENTATION: LEFT

## 2023-08-02 ASSESSMENT — PAIN DESCRIPTION - PAIN TYPE: TYPE: ACUTE PAIN

## 2023-08-02 NOTE — ED NOTES
Patient given d/c instructions with return verbalization. Emphasis on f/u, to return with worsening s/s. Reviewed sling application and removal.  Patient ambulated to lobby with steady gait.      Asuncion Vora RN  08/02/23 6008

## 2023-08-02 NOTE — ED PROVIDER NOTES
[address] ENCOUNTER      Pt Name: Henry Roa  MRN: [id number]  [address] [facility], [place] 1995  Date of evaluation: 2023  Provider: Viridiana Wild MD    CHIEF COMPLAINT       Chief Complaint   Patient presents with    Arm Pain         HISTORY OF PRESENT ILLNESS   (Location/Symptom, Timing/Onset, Context/Setting, Quality, Duration, Modifying Factors, Severity)  Note limiting factors. Henry Roa is a 29 y.o. female with past medical history of bipolar disorder, depression and anxiety here today with left elbow pain. Patient states she was moving her child's bed when she lost her footing fell twice on 2 separate occasions on her left elbow. Is having a throbbing aching pain in the left elbow with limited range of motion as a result of pain. Notes a tingling sensation in her fingers. Did not hit her head. No loss of consciousness. Unable to range the left elbow secondary to pain    HPI    Nursing Notes were reviewed. REVIEW OF SYSTEMS    (2-9 systems for level 4, 10 or more for level 5)     Review of Systems    Please see HPI for pertinent positive and negative review of system findings. A full 10 system ROS was performed and otherwise negative.         PAST MEDICAL HISTORY     Past Medical History:   Diagnosis Date    Anxiety 2018    Depressive disorder 2018    History of pulmonary embolism 2017    implanted birth control? saw hematologist @ [facility]    Hx of blood clots     Hypertension     Migraines     Psychosis ([address]) 2018    Smoker          SURGICAL HISTORY       Past Surgical History:   Procedure Laterality Date     SECTION  11/28/2011    X2, 2017 and      SECTION  2017    CHOLECYSTECTOMY, LAPAROSCOPIC  2017    ROBOTIC XI ASSISTED LAPAROSCOPIC CHOLECYSTECTOMY WITH CHOLANGIOGRAM    LAPAROSCOPY N/A 2021    ROBOTIC ASSISTED RESECTION OF ENDOMETRIOSIS - CO2 LASER performed by Jake Hung MD at [facility] OR    TUBAL LIGATION      UPPER GASTROINTESTINAL ENDOSCOPY N/A 2021    EGD DIAGNOSTIC ONLY performed by Marea Mortimer, MD at [address] [facility] [place]       Previous Medications    ACETAMINOPHEN (TYLENOL) 500 MG TABLET    Take 1 tablet by mouth 4 times daily as needed for Pain    AMPHETAMINE-DEXTROAMPHETAMINE (ADDERALL XR) 10 MG EXTENDED RELEASE CAPSULE    Take 10 mg by mouth 2 times daily. CLONAZEPAM (KLONOPIN) 2 MG TABLET    Take 2 mg by mouth 3 times daily as needed.      DOCUSATE SODIUM (COLACE) 100 MG CAPSULE    Take 1 capsule by mouth daily as needed for Constipation    FLURBIPROFEN (ANSAID) 100 MG TABLET    Take 1 tablet by mouth 2 times daily W/ food    LITHIUM 300 MG CAPSULE    Take 1 capsule by mouth 4 times daily     MELATONIN (RA MELATONIN) 3 MG TABS TABLET    Take 1 tablet by mouth daily    ONDANSETRON (ZOFRAN-ODT) 4 MG DISINTEGRATING TABLET    Take 1 tablet by mouth 3 times daily as needed for Nausea or Vomiting    PROPRANOLOL (INDERAL) 20 MG TABLET    Take 20 mg by mouth daily     SPACER/AERO-HOLDING CHAMBERS GILLIAN    1 Device by Does not apply route once for 1 dose       ALLERGIES     Etonogestrel, Naprosyn [naproxen], Pcn [penicillins], Morphine, and Toradol [ketorolac tromethamine]    FAMILY HISTORY       Family History   Problem Relation Age of Onset    Hypertension Father     Hypertension Mother     Bipolar Disorder Mother     Anxiety Disorder Mother     Depression Mother     Depression Sister     Depression Brother           SOCIAL HISTORY       Social History     Socioeconomic History    Marital status: Single     Spouse name: None    Number of children: 2    Years of education: None    Highest education level: None   Tobacco Use    Smoking status: Every Day     Packs/day: 0.50     Years: 7.00     Pack years: 3.50     Types: Cigarettes, E-Cigarettes     Last attempt to quit: 3/1/2023     Years since quittin.4     Passive exposure: Never    Smokeless

## 2023-08-02 NOTE — ED TRIAGE NOTES
Pt c/o L arm pain 10/10. Shan Martinez twice yesterday trying to move a mattress, landed on elbow both times. Guards arm, placed on pillow, grimaces in pain. Fingers feel numb and tingling, nailbeds pink, can move fingers but has severe pain.

## 2023-10-05 ENCOUNTER — HOSPITAL ENCOUNTER (EMERGENCY)
Age: 28
Discharge: HOME | End: 2023-10-05
Attending: EMERGENCY MEDICINE
Payer: MEDICAID

## 2023-10-05 ENCOUNTER — APPOINTMENT (OUTPATIENT)
Dept: GENERAL RADIOLOGY | Age: 28
End: 2023-10-05
Payer: MEDICAID

## 2023-10-05 VITALS
HEIGHT: 65 IN | HEART RATE: 86 BPM | TEMPERATURE: 99.2 F | DIASTOLIC BLOOD PRESSURE: 75 MMHG | WEIGHT: 200.44 LBS | SYSTOLIC BLOOD PRESSURE: 132 MMHG | BODY MASS INDEX: 33.4 KG/M2 | OXYGEN SATURATION: 98 % | RESPIRATION RATE: 16 BRPM

## 2023-10-05 DIAGNOSIS — R11.2 NAUSEA AND VOMITING, UNSPECIFIED VOMITING TYPE: ICD-10-CM

## 2023-10-05 DIAGNOSIS — Z20.822 PERSON UNDER INVESTIGATION FOR COVID-19: ICD-10-CM

## 2023-10-05 DIAGNOSIS — J98.01 BRONCHOSPASM: ICD-10-CM

## 2023-10-05 DIAGNOSIS — H60.391 INFECTIVE OTITIS EXTERNA OF RIGHT EAR: ICD-10-CM

## 2023-10-05 DIAGNOSIS — J06.9 ACUTE UPPER RESPIRATORY INFECTION: Primary | ICD-10-CM

## 2023-10-05 LAB
ALBUMIN SERPL-MCNC: 4.4 G/DL (ref 3.4–5)
ALBUMIN/GLOB SERPL: 1.6 {RATIO} (ref 1.1–2.2)
ALP SERPL-CCNC: 76 U/L (ref 40–129)
ALT SERPL-CCNC: 23 U/L (ref 10–40)
ANION GAP SERPL CALCULATED.3IONS-SCNC: 9 MMOL/L (ref 3–16)
AST SERPL-CCNC: 18 U/L (ref 15–37)
BASOPHILS # BLD: 0.1 K/UL (ref 0–0.2)
BASOPHILS NFR BLD: 0.6 %
BILIRUB SERPL-MCNC: 0.9 MG/DL (ref 0–1)
BILIRUB UR QL STRIP.AUTO: NEGATIVE
BUN SERPL-MCNC: 4 MG/DL (ref 7–20)
CALCIUM SERPL-MCNC: 10 MG/DL (ref 8.3–10.6)
CHLORIDE SERPL-SCNC: 109 MMOL/L (ref 99–110)
CLARITY UR: CLEAR
CO2 SERPL-SCNC: 24 MMOL/L (ref 21–32)
COLOR UR: YELLOW
CREAT SERPL-MCNC: 0.6 MG/DL (ref 0.6–1.1)
DEPRECATED RDW RBC AUTO: 14 % (ref 12.4–15.4)
EOSINOPHIL # BLD: 0.3 K/UL (ref 0–0.6)
EOSINOPHIL NFR BLD: 1.9 %
EPI CELLS #/AREA URNS HPF: NORMAL /HPF (ref 0–5)
FLUAV RNA UPPER RESP QL NAA+PROBE: NEGATIVE
FLUBV AG NPH QL: NEGATIVE
GFR SERPLBLD CREATININE-BSD FMLA CKD-EPI: >60 ML/MIN/{1.73_M2}
GLUCOSE SERPL-MCNC: 92 MG/DL (ref 70–99)
GLUCOSE UR STRIP.AUTO-MCNC: NEGATIVE MG/DL
HCG SERPL QL: NEGATIVE
HCT VFR BLD AUTO: 39.9 % (ref 36–48)
HGB BLD-MCNC: 13.5 G/DL (ref 12–16)
HGB UR QL STRIP.AUTO: ABNORMAL
KETONES UR STRIP.AUTO-MCNC: NEGATIVE MG/DL
LEUKOCYTE ESTERASE UR QL STRIP.AUTO: NEGATIVE
LYMPHOCYTES # BLD: 2.3 K/UL (ref 1–5.1)
LYMPHOCYTES NFR BLD: 16.3 %
MCH RBC QN AUTO: 29.9 PG (ref 26–34)
MCHC RBC AUTO-ENTMCNC: 33.8 G/DL (ref 31–36)
MCV RBC AUTO: 88.5 FL (ref 80–100)
MONOCYTES # BLD: 0.9 K/UL (ref 0–1.3)
MONOCYTES NFR BLD: 6.5 %
NEUTROPHILS # BLD: 10.8 K/UL (ref 1.7–7.7)
NEUTROPHILS NFR BLD: 74.7 %
NITRITE UR QL STRIP.AUTO: NEGATIVE
PH UR STRIP.AUTO: 6.5 [PH] (ref 5–8)
PLATELET # BLD AUTO: 285 K/UL (ref 135–450)
PMV BLD AUTO: 8.4 FL (ref 5–10.5)
POTASSIUM SERPL-SCNC: 3.7 MMOL/L (ref 3.5–5.1)
PROT SERPL-MCNC: 7.2 G/DL (ref 6.4–8.2)
PROT UR STRIP.AUTO-MCNC: NEGATIVE MG/DL
RBC # BLD AUTO: 4.5 M/UL (ref 4–5.2)
RBC #/AREA URNS HPF: NORMAL /HPF (ref 0–4)
S PYO AG THROAT QL: NEGATIVE
SODIUM SERPL-SCNC: 142 MMOL/L (ref 136–145)
SP GR UR STRIP.AUTO: <=1.005 (ref 1–1.03)
UA COMPLETE W REFLEX CULTURE PNL UR: ABNORMAL
UA DIPSTICK W REFLEX MICRO PNL UR: YES
URN SPEC COLLECT METH UR: ABNORMAL
UROBILINOGEN UR STRIP-ACNC: 0.2 E.U./DL
WBC # BLD AUTO: 14.4 K/UL (ref 4–11)
WBC #/AREA URNS HPF: NORMAL /HPF (ref 0–5)

## 2023-10-05 PROCEDURE — 94664 DEMO&/EVAL PT USE INHALER: CPT

## 2023-10-05 PROCEDURE — 81001 URINALYSIS AUTO W/SCOPE: CPT

## 2023-10-05 PROCEDURE — 96374 THER/PROPH/DIAG INJ IV PUSH: CPT

## 2023-10-05 PROCEDURE — 84703 CHORIONIC GONADOTROPIN ASSAY: CPT

## 2023-10-05 PROCEDURE — 71046 X-RAY EXAM CHEST 2 VIEWS: CPT

## 2023-10-05 PROCEDURE — 87635 SARS-COV-2 COVID-19 AMP PRB: CPT

## 2023-10-05 PROCEDURE — 87804 INFLUENZA ASSAY W/OPTIC: CPT

## 2023-10-05 PROCEDURE — 96361 HYDRATE IV INFUSION ADD-ON: CPT

## 2023-10-05 PROCEDURE — 80053 COMPREHEN METABOLIC PANEL: CPT

## 2023-10-05 PROCEDURE — 85025 COMPLETE CBC W/AUTO DIFF WBC: CPT

## 2023-10-05 PROCEDURE — 2580000003 HC RX 258: Performed by: EMERGENCY MEDICINE

## 2023-10-05 PROCEDURE — 87081 CULTURE SCREEN ONLY: CPT

## 2023-10-05 PROCEDURE — 6360000002 HC RX W HCPCS: Performed by: EMERGENCY MEDICINE

## 2023-10-05 PROCEDURE — 87880 STREP A ASSAY W/OPTIC: CPT

## 2023-10-05 PROCEDURE — 96375 TX/PRO/DX INJ NEW DRUG ADDON: CPT

## 2023-10-05 PROCEDURE — 6370000000 HC RX 637 (ALT 250 FOR IP): Performed by: EMERGENCY MEDICINE

## 2023-10-05 PROCEDURE — 94640 AIRWAY INHALATION TREATMENT: CPT

## 2023-10-05 PROCEDURE — 99284 EMERGENCY DEPT VISIT MOD MDM: CPT

## 2023-10-05 RX ORDER — AZITHROMYCIN 250 MG/1
TABLET, FILM COATED ORAL
Qty: 6 TABLET | Refills: 0 | Status: SHIPPED | OUTPATIENT
Start: 2023-10-05 | End: 2023-10-15

## 2023-10-05 RX ORDER — ALBUTEROL SULFATE 90 UG/1
2 AEROSOL, METERED RESPIRATORY (INHALATION) EVERY 6 HOURS PRN
Qty: 18 G | Refills: 1 | Status: SHIPPED | OUTPATIENT
Start: 2023-10-05

## 2023-10-05 RX ORDER — HYDROCODONE BITARTRATE AND ACETAMINOPHEN 5; 325 MG/1; MG/1
1 TABLET ORAL ONCE
Status: DISCONTINUED | OUTPATIENT
Start: 2023-10-05 | End: 2023-10-05 | Stop reason: HOSPADM

## 2023-10-05 RX ORDER — PREDNISONE 20 MG/1
40 TABLET ORAL DAILY
Qty: 8 TABLET | Refills: 0 | Status: SHIPPED | OUTPATIENT
Start: 2023-10-05 | End: 2023-10-09

## 2023-10-05 RX ORDER — IPRATROPIUM BROMIDE AND ALBUTEROL SULFATE 2.5; .5 MG/3ML; MG/3ML
1 SOLUTION RESPIRATORY (INHALATION) ONCE
Status: COMPLETED | OUTPATIENT
Start: 2023-10-05 | End: 2023-10-05

## 2023-10-05 RX ORDER — 0.9 % SODIUM CHLORIDE 0.9 %
1000 INTRAVENOUS SOLUTION INTRAVENOUS ONCE
Status: COMPLETED | OUTPATIENT
Start: 2023-10-05 | End: 2023-10-05

## 2023-10-05 RX ORDER — ONDANSETRON 4 MG/1
4 TABLET, ORALLY DISINTEGRATING ORAL 3 TIMES DAILY PRN
Qty: 15 TABLET | Refills: 0 | Status: SHIPPED | OUTPATIENT
Start: 2023-10-05

## 2023-10-05 RX ORDER — ONDANSETRON 2 MG/ML
4 INJECTION INTRAMUSCULAR; INTRAVENOUS ONCE
Status: COMPLETED | OUTPATIENT
Start: 2023-10-05 | End: 2023-10-05

## 2023-10-05 RX ADMIN — ONDANSETRON 4 MG: 2 INJECTION INTRAMUSCULAR; INTRAVENOUS at 14:37

## 2023-10-05 RX ADMIN — HYDROMORPHONE HYDROCHLORIDE 0.5 MG: 1 INJECTION, SOLUTION INTRAMUSCULAR; INTRAVENOUS; SUBCUTANEOUS at 15:22

## 2023-10-05 RX ADMIN — SODIUM CHLORIDE 1000 ML: 9 INJECTION, SOLUTION INTRAVENOUS at 14:39

## 2023-10-05 RX ADMIN — IPRATROPIUM BROMIDE AND ALBUTEROL SULFATE 1 DOSE: 2.5; .5 SOLUTION RESPIRATORY (INHALATION) at 14:22

## 2023-10-05 ASSESSMENT — PAIN - FUNCTIONAL ASSESSMENT
PAIN_FUNCTIONAL_ASSESSMENT: 0-10
PAIN_FUNCTIONAL_ASSESSMENT: 0-10

## 2023-10-05 ASSESSMENT — PAIN DESCRIPTION - DESCRIPTORS: DESCRIPTORS: ACHING

## 2023-10-05 ASSESSMENT — PAIN SCALES - GENERAL
PAINLEVEL_OUTOF10: 3
PAINLEVEL_OUTOF10: 10
PAINLEVEL_OUTOF10: 8

## 2023-10-05 NOTE — ED NOTES
Patient given d/c instructions with return verbalization including four Rx. Reviewed each med with pt. Work note given. To return with worsening s/s. Emphasis on f/u. States that father is keeping her kids this evening so she can get some sleep. Pt ambulated to lobby with steady gait.      Jennifer Russo RN  10/05/23 9111

## 2023-10-05 NOTE — ED NOTES
Patient states that she feels much better, ear pain gone, throat a little sore, body aches gone.      Talat Rai, TONIO  10/05/23 7942

## 2023-10-05 NOTE — ED PROVIDER NOTES
instructions. Referral to follow up provider. Differential Diagnosis: Sepsis, meningitis, pneumonia, pyelonephritis, other  New Prescriptions    ALBUTEROL SULFATE HFA (PROVENTIL;VENTOLIN;PROAIR) 108 (90 BASE) MCG/ACT INHALER    Inhale 2 puffs into the lungs every 6 hours as needed for Wheezing At patient's preference may use 60 dose MDI. AZITHROMYCIN (ZITHROMAX) 250 MG TABLET    2 TABS DAY 1, THEN 1 TAB DAYS 2-5    NEOMYCIN-POLYMYXIN-HYDROCORTISONE (CORTISPORIN) 3.5-31694-6 OTIC SOLUTION    Place 4 drops into the right ear 3 times daily for 7 days    ONDANSETRON (ZOFRAN-ODT) 4 MG DISINTEGRATING TABLET    Take 1 tablet by mouth 3 times daily as needed for Nausea or Vomiting    PREDNISONE (DELTASONE) 20 MG TABLET    Take 2 tablets by mouth daily for 4 days     FOLLOW UP:    Issa Klein MD  [place] [address] [address] [facility]  [phone]    Schedule an appointment as soon as possible for a visit       [facility] Emergency Department  [address] [facility] [address]  Go to   If symptoms worsen    FINAL IMPRESSION:    1. Acute upper respiratory infection    2. Bronchospasm    3. Infective otitis externa of right ear    4. Nausea and vomiting, unspecified vomiting type    5. Person under investigation for COVID-19        (Please note that I used voice recognition software to generate this note. Occasionally words are mistranscribed despite my efforts to edit errors. Pham Negron MD  10/05/23 4284

## 2023-10-05 NOTE — ED TRIAGE NOTES
Patient c/o ear pain, cough, hurts all over. Has been going on for over one week. Went to Urgent care last week, states that she was given an antibiotic but has not gotten better.

## 2023-10-06 LAB — SARS-COV-2 RNA RESP QL NAA+PROBE: NOT DETECTED

## 2023-10-08 LAB — S PYO THROAT QL CULT: NORMAL

## 2023-11-13 ENCOUNTER — HOSPITAL ENCOUNTER (EMERGENCY)
Age: 28
Discharge: HOME | End: 2023-11-13
Attending: EMERGENCY MEDICINE
Payer: MEDICAID

## 2023-11-13 VITALS
HEIGHT: 64 IN | HEART RATE: 74 BPM | BODY MASS INDEX: 33.53 KG/M2 | WEIGHT: 196.4 LBS | TEMPERATURE: 99.1 F | SYSTOLIC BLOOD PRESSURE: 134 MMHG | RESPIRATION RATE: 18 BRPM | DIASTOLIC BLOOD PRESSURE: 97 MMHG | OXYGEN SATURATION: 98 %

## 2023-11-13 DIAGNOSIS — F12.90 CANNABINOID HYPEREMESIS SYNDROME: Primary | ICD-10-CM

## 2023-11-13 DIAGNOSIS — R74.01 ELEVATED LIVER TRANSAMINASE LEVEL: ICD-10-CM

## 2023-11-13 DIAGNOSIS — R11.2 CANNABINOID HYPEREMESIS SYNDROME: Primary | ICD-10-CM

## 2023-11-13 DIAGNOSIS — E87.6 HYPOKALEMIA: ICD-10-CM

## 2023-11-13 LAB
ALBUMIN SERPL-MCNC: 5 G/DL (ref 3.4–5)
ALP SERPL-CCNC: 78 U/L (ref 40–129)
ALT SERPL-CCNC: 188 U/L (ref 10–40)
AMPHETAMINES UR QL SCN>1000 NG/ML: ABNORMAL
ANION GAP SERPL CALCULATED.3IONS-SCNC: 11 MMOL/L (ref 3–16)
AST SERPL-CCNC: 44 U/L (ref 15–37)
BACTERIA URNS QL MICRO: ABNORMAL /HPF
BARBITURATES UR QL SCN>200 NG/ML: ABNORMAL
BASOPHILS # BLD: 0.1 K/UL (ref 0–0.2)
BASOPHILS NFR BLD: 0.8 %
BENZODIAZ UR QL SCN>200 NG/ML: ABNORMAL
BILIRUB DIRECT SERPL-MCNC: <0.2 MG/DL (ref 0–0.3)
BILIRUB INDIRECT SERPL-MCNC: ABNORMAL MG/DL (ref 0–1)
BILIRUB SERPL-MCNC: 1 MG/DL (ref 0–1)
BILIRUB UR QL STRIP.AUTO: NEGATIVE
BUN SERPL-MCNC: 4 MG/DL (ref 7–20)
CALCIUM SERPL-MCNC: 10.8 MG/DL (ref 8.3–10.6)
CANNABINOIDS UR QL SCN>50 NG/ML: POSITIVE
CHLORIDE SERPL-SCNC: 100 MMOL/L (ref 99–110)
CLARITY UR: CLEAR
CO2 SERPL-SCNC: 28 MMOL/L (ref 21–32)
COCAINE UR QL SCN: ABNORMAL
COLOR UR: YELLOW
CREAT SERPL-MCNC: 0.7 MG/DL (ref 0.6–1.1)
DEPRECATED RDW RBC AUTO: 13.8 % (ref 12.4–15.4)
DRUG SCREEN COMMENT UR-IMP: ABNORMAL
EOSINOPHIL # BLD: 0.1 K/UL (ref 0–0.6)
EOSINOPHIL NFR BLD: 0.8 %
EPI CELLS #/AREA URNS HPF: ABNORMAL /HPF (ref 0–5)
FENTANYL SCREEN, URINE: ABNORMAL
GFR SERPLBLD CREATININE-BSD FMLA CKD-EPI: >60 ML/MIN/{1.73_M2}
GLUCOSE SERPL-MCNC: 101 MG/DL (ref 70–99)
GLUCOSE UR STRIP.AUTO-MCNC: NEGATIVE MG/DL
HCG UR QL: NEGATIVE
HCT VFR BLD AUTO: 42 % (ref 36–48)
HGB BLD-MCNC: 14.4 G/DL (ref 12–16)
HGB UR QL STRIP.AUTO: ABNORMAL
KETONES UR STRIP.AUTO-MCNC: NEGATIVE MG/DL
LEUKOCYTE ESTERASE UR QL STRIP.AUTO: NEGATIVE
LIPASE SERPL-CCNC: 19 U/L (ref 13–60)
LYMPHOCYTES # BLD: 2.7 K/UL (ref 1–5.1)
LYMPHOCYTES NFR BLD: 17.3 %
MAGNESIUM SERPL-MCNC: 2.3 MG/DL (ref 1.8–2.4)
MCH RBC QN AUTO: 29.8 PG (ref 26–34)
MCHC RBC AUTO-ENTMCNC: 34.2 G/DL (ref 31–36)
MCV RBC AUTO: 87 FL (ref 80–100)
METHADONE UR QL SCN>300 NG/ML: ABNORMAL
MONOCYTES # BLD: 1.1 K/UL (ref 0–1.3)
MONOCYTES NFR BLD: 7.3 %
NEUTROPHILS # BLD: 11.4 K/UL (ref 1.7–7.7)
NEUTROPHILS NFR BLD: 73.8 %
NITRITE UR QL STRIP.AUTO: NEGATIVE
OPIATES UR QL SCN>300 NG/ML: ABNORMAL
OXYCODONE UR QL SCN: ABNORMAL
PCP UR QL SCN>25 NG/ML: ABNORMAL
PH UR STRIP.AUTO: 7.5 [PH] (ref 5–8)
PH UR STRIP: 7.5 [PH]
PLATELET # BLD AUTO: 289 K/UL (ref 135–450)
PMV BLD AUTO: 8.9 FL (ref 5–10.5)
POTASSIUM SERPL-SCNC: 3.2 MMOL/L (ref 3.5–5.1)
PROT SERPL-MCNC: 7.6 G/DL (ref 6.4–8.2)
PROT UR STRIP.AUTO-MCNC: ABNORMAL MG/DL
RBC # BLD AUTO: 4.82 M/UL (ref 4–5.2)
RBC #/AREA URNS HPF: ABNORMAL /HPF (ref 0–4)
SODIUM SERPL-SCNC: 139 MMOL/L (ref 136–145)
SP GR UR STRIP.AUTO: <=1.005 (ref 1–1.03)
UA COMPLETE W REFLEX CULTURE PNL UR: ABNORMAL
UA DIPSTICK W REFLEX MICRO PNL UR: YES
URN SPEC COLLECT METH UR: ABNORMAL
UROBILINOGEN UR STRIP-ACNC: 0.2 E.U./DL
WBC # BLD AUTO: 15.5 K/UL (ref 4–11)
WBC #/AREA URNS HPF: ABNORMAL /HPF (ref 0–5)

## 2023-11-13 PROCEDURE — 83690 ASSAY OF LIPASE: CPT

## 2023-11-13 PROCEDURE — 2580000003 HC RX 258: Performed by: EMERGENCY MEDICINE

## 2023-11-13 PROCEDURE — 80076 HEPATIC FUNCTION PANEL: CPT

## 2023-11-13 PROCEDURE — 6360000002 HC RX W HCPCS: Performed by: EMERGENCY MEDICINE

## 2023-11-13 PROCEDURE — 80307 DRUG TEST PRSMV CHEM ANLYZR: CPT

## 2023-11-13 PROCEDURE — 96375 TX/PRO/DX INJ NEW DRUG ADDON: CPT

## 2023-11-13 PROCEDURE — 83735 ASSAY OF MAGNESIUM: CPT

## 2023-11-13 PROCEDURE — 99284 EMERGENCY DEPT VISIT MOD MDM: CPT

## 2023-11-13 PROCEDURE — 85025 COMPLETE CBC W/AUTO DIFF WBC: CPT

## 2023-11-13 PROCEDURE — 81001 URINALYSIS AUTO W/SCOPE: CPT

## 2023-11-13 PROCEDURE — 84703 CHORIONIC GONADOTROPIN ASSAY: CPT

## 2023-11-13 PROCEDURE — 96361 HYDRATE IV INFUSION ADD-ON: CPT

## 2023-11-13 PROCEDURE — 80048 BASIC METABOLIC PNL TOTAL CA: CPT

## 2023-11-13 PROCEDURE — 96374 THER/PROPH/DIAG INJ IV PUSH: CPT

## 2023-11-13 RX ORDER — DIPHENHYDRAMINE HCL 25 MG
25 CAPSULE ORAL 3 TIMES DAILY PRN
Qty: 15 CAPSULE | Refills: 0 | Status: SHIPPED | OUTPATIENT
Start: 2023-11-13

## 2023-11-13 RX ORDER — SODIUM CHLORIDE, SODIUM LACTATE, POTASSIUM CHLORIDE, AND CALCIUM CHLORIDE .6; .31; .03; .02 G/100ML; G/100ML; G/100ML; G/100ML
1000 INJECTION, SOLUTION INTRAVENOUS ONCE
Status: COMPLETED | OUTPATIENT
Start: 2023-11-13 | End: 2023-11-13

## 2023-11-13 RX ORDER — ASPIRIN 81 MG
TABLET,CHEWABLE ORAL
Qty: 60 G | Refills: 1 | Status: SHIPPED | OUTPATIENT
Start: 2023-11-13

## 2023-11-13 RX ORDER — DROPERIDOL 2.5 MG/ML
1.25 INJECTION, SOLUTION INTRAMUSCULAR; INTRAVENOUS ONCE
Status: COMPLETED | OUTPATIENT
Start: 2023-11-13 | End: 2023-11-13

## 2023-11-13 RX ORDER — HALOPERIDOL 2 MG/1
2 TABLET ORAL 3 TIMES DAILY PRN
Qty: 15 TABLET | Refills: 0 | Status: SHIPPED | OUTPATIENT
Start: 2023-11-13

## 2023-11-13 RX ORDER — DIPHENHYDRAMINE HYDROCHLORIDE 50 MG/ML
25 INJECTION INTRAMUSCULAR; INTRAVENOUS ONCE
Status: COMPLETED | OUTPATIENT
Start: 2023-11-13 | End: 2023-11-13

## 2023-11-13 RX ADMIN — DIPHENHYDRAMINE HYDROCHLORIDE 25 MG: 50 INJECTION INTRAMUSCULAR; INTRAVENOUS at 13:40

## 2023-11-13 RX ADMIN — SODIUM CHLORIDE, POTASSIUM CHLORIDE, SODIUM LACTATE AND CALCIUM CHLORIDE 1000 ML: 600; 310; 30; 20 INJECTION, SOLUTION INTRAVENOUS at 13:46

## 2023-11-13 RX ADMIN — DROPERIDOL 1.25 MG: 2.5 INJECTION, SOLUTION INTRAMUSCULAR; INTRAVENOUS at 13:43

## 2023-11-13 ASSESSMENT — PAIN - FUNCTIONAL ASSESSMENT
PAIN_FUNCTIONAL_ASSESSMENT: NONE - DENIES PAIN
PAIN_FUNCTIONAL_ASSESSMENT: NONE - DENIES PAIN

## 2023-11-13 NOTE — ED NOTES
Patient to ed with complaints of nausea and vomiting which started 6 days ago.      Walt Arevalo RN  11/13/23 7867

## 2023-11-13 NOTE — ED NOTES
Patient given prescription, discharge instructions verbal and written, patient verbalized understanding. Alert/oriented X4, Clear speech.   Patient exhibits no distress, ambulates with steady gait per self leaving unit, no further request.      Abril Massey RN  11/13/23 [id number]

## 2023-12-08 ENCOUNTER — OFFICE VISIT (OUTPATIENT)
Dept: CARDIOLOGY CLINIC | Age: 28
End: 2023-12-08
Payer: MEDICAID

## 2023-12-08 VITALS
SYSTOLIC BLOOD PRESSURE: 122 MMHG | WEIGHT: 199 LBS | BODY MASS INDEX: 34.16 KG/M2 | DIASTOLIC BLOOD PRESSURE: 90 MMHG | HEART RATE: 77 BPM

## 2023-12-08 DIAGNOSIS — I10 HYPERTENSION, UNSPECIFIED TYPE: Primary | ICD-10-CM

## 2023-12-08 PROCEDURE — 99204 OFFICE O/P NEW MOD 45 MIN: CPT | Performed by: INTERNAL MEDICINE

## 2023-12-08 PROCEDURE — 3079F DIAST BP 80-89 MM HG: CPT | Performed by: INTERNAL MEDICINE

## 2023-12-08 PROCEDURE — 3074F SYST BP LT 130 MM HG: CPT | Performed by: INTERNAL MEDICINE

## 2023-12-08 PROCEDURE — 93000 ELECTROCARDIOGRAM COMPLETE: CPT | Performed by: INTERNAL MEDICINE

## 2023-12-08 RX ORDER — PROPRANOLOL HYDROCHLORIDE 20 MG/1
10 TABLET ORAL 2 TIMES DAILY
Qty: 90 TABLET | Refills: 3 | Status: CANCELLED | OUTPATIENT
Start: 2023-12-08

## 2023-12-08 RX ORDER — PROPRANOLOL HYDROCHLORIDE 10 MG/1
10 TABLET ORAL 2 TIMES DAILY
Qty: 180 TABLET | Refills: 1 | Status: SHIPPED | OUTPATIENT
Start: 2023-12-08

## 2023-12-08 ASSESSMENT — ENCOUNTER SYMPTOMS
SHORTNESS OF BREATH: 0
COUGH: 0
CHEST TIGHTNESS: 0
CHOKING: 0

## 2023-12-08 NOTE — PROGRESS NOTES
Subjective:      Patient ID: Jayashree Travis is a 29 y.o. female. HPI  Referred for HTN. On propranolol 80 mg bid. for tremors. HTN for yr. Labile. HA/blurred vision. Wt 20 over past 6 months.      Past Medical History:   Diagnosis Date    Anxiety 2018    Depressive disorder 2018    History of pulmonary embolism 2017    implanted birth control? saw hematologist @ [facility]    Hx of blood clots     Hypertension     Migraines     Psychosis ([address]) 2018    Smoker      Past Surgical History:   Procedure Laterality Date     SECTION  11/28/2011    X2, 2017 and      SECTION  2017    CHOLECYSTECTOMY, LAPAROSCOPIC  2017    ROBOTIC XI ASSISTED LAPAROSCOPIC CHOLECYSTECTOMY WITH CHOLANGIOGRAM    LAPAROSCOPY N/A 2021    ROBOTIC ASSISTED RESECTION OF ENDOMETRIOSIS - CO2 LASER performed by Stefanie Merlos MD at [facility] [address] ENDOSCOPY N/A 2021    EGD DIAGNOSTIC ONLY performed by Mark Mcdowell MD at [address] [place] [address] Ave History     Socioeconomic History    Marital status: Single     Spouse name: Not on file    Number of children: 2    Years of education: Not on file    Highest education level: Not on file   Occupational History    Not on file   Tobacco Use    Smoking status: Every Day     Packs/day: 0.50     Years: 7.00     Additional pack years: 0.00     Total pack years: 3.50     Types: Cigarettes, E-Cigarettes     Last attempt to quit: 3/1/2023     Years since quittin.7     Passive exposure: Never    Smokeless tobacco: Never    Tobacco comments:     off and on since Feb   Vaping Use    Vaping Use: Every day    Substances: Flavoring, medical vape    Devices: Disposable   Substance and Sexual Activity    Alcohol use: Yes     Comment: socially    Drug use: Yes     Types: Marijuana Jennifer Rival)    Sexual activity: Not Currently     Partners: Male   Other Topics Concern    Not on file   Social History Narrative    Not

## 2024-05-13 ENCOUNTER — HOSPITAL ENCOUNTER (EMERGENCY)
Age: 29
Discharge: HOME | End: 2024-05-13
Attending: EMERGENCY MEDICINE
Payer: MEDICAID

## 2024-05-13 ENCOUNTER — APPOINTMENT (OUTPATIENT)
Dept: GENERAL RADIOLOGY | Age: 29
End: 2024-05-13
Payer: MEDICAID

## 2024-05-13 VITALS
HEIGHT: 64 IN | RESPIRATION RATE: 14 BRPM | TEMPERATURE: 98 F | HEART RATE: 56 BPM | BODY MASS INDEX: 35.1 KG/M2 | OXYGEN SATURATION: 100 % | WEIGHT: 205.6 LBS | DIASTOLIC BLOOD PRESSURE: 84 MMHG | SYSTOLIC BLOOD PRESSURE: 118 MMHG

## 2024-05-13 DIAGNOSIS — J06.9 ACUTE UPPER RESPIRATORY INFECTION: Primary | ICD-10-CM

## 2024-05-13 LAB
ANION GAP SERPL CALCULATED.3IONS-SCNC: 7 MMOL/L (ref 3–16)
BASOPHILS # BLD: 0.1 K/UL (ref 0–0.2)
BASOPHILS NFR BLD: 0.7 %
BUN SERPL-MCNC: 8 MG/DL (ref 7–20)
CALCIUM SERPL-MCNC: 9.6 MG/DL (ref 8.3–10.6)
CHLORIDE SERPL-SCNC: 106 MMOL/L (ref 99–110)
CO2 SERPL-SCNC: 25 MMOL/L (ref 21–32)
CREAT SERPL-MCNC: 0.8 MG/DL (ref 0.6–1.1)
DEPRECATED RDW RBC AUTO: 13.6 % (ref 12.4–15.4)
EKG ATRIAL RATE: 70 BPM
EKG DIAGNOSIS: NORMAL
EKG P AXIS: 46 DEGREES
EKG P-R INTERVAL: 194 MS
EKG Q-T INTERVAL: 418 MS
EKG QRS DURATION: 86 MS
EKG QTC CALCULATION (BAZETT): 451 MS
EKG R AXIS: -13 DEGREES
EKG T AXIS: 32 DEGREES
EKG VENTRICULAR RATE: 70 BPM
EOSINOPHIL # BLD: 0.3 K/UL (ref 0–0.6)
EOSINOPHIL NFR BLD: 2.3 %
FLUAV RNA RESP QL NAA+PROBE: NOT DETECTED
FLUBV RNA RESP QL NAA+PROBE: NOT DETECTED
GFR SERPLBLD CREATININE-BSD FMLA CKD-EPI: >90 ML/MIN/{1.73_M2}
GLUCOSE SERPL-MCNC: 92 MG/DL (ref 70–99)
HCG SERPL QL: NEGATIVE
HCT VFR BLD AUTO: 41.8 % (ref 36–48)
HGB BLD-MCNC: 13.9 G/DL (ref 12–16)
LYMPHOCYTES # BLD: 2.6 K/UL (ref 1–5.1)
LYMPHOCYTES NFR BLD: 21 %
MCH RBC QN AUTO: 30.5 PG (ref 26–34)
MCHC RBC AUTO-ENTMCNC: 33.3 G/DL (ref 31–36)
MCV RBC AUTO: 91.4 FL (ref 80–100)
MONOCYTES # BLD: 0.7 K/UL (ref 0–1.3)
MONOCYTES NFR BLD: 5.8 %
NEUTROPHILS # BLD: 8.8 K/UL (ref 1.7–7.7)
NEUTROPHILS NFR BLD: 70.2 %
PLATELET # BLD AUTO: 280 K/UL (ref 135–450)
PMV BLD AUTO: 8.8 FL (ref 5–10.5)
POTASSIUM SERPL-SCNC: 3.8 MMOL/L (ref 3.5–5.1)
RBC # BLD AUTO: 4.58 M/UL (ref 4–5.2)
SARS-COV-2 RNA RESP QL NAA+PROBE: NOT DETECTED
SODIUM SERPL-SCNC: 138 MMOL/L (ref 136–145)
TROPONIN, HIGH SENSITIVITY: 10 NG/L (ref 0–14)
TROPONIN, HIGH SENSITIVITY: <6 NG/L (ref 0–14)
WBC # BLD AUTO: 12.5 K/UL (ref 4–11)

## 2024-05-13 PROCEDURE — 99285 EMERGENCY DEPT VISIT HI MDM: CPT

## 2024-05-13 PROCEDURE — 71046 X-RAY EXAM CHEST 2 VIEWS: CPT

## 2024-05-13 PROCEDURE — 85025 COMPLETE CBC W/AUTO DIFF WBC: CPT

## 2024-05-13 PROCEDURE — 94640 AIRWAY INHALATION TREATMENT: CPT

## 2024-05-13 PROCEDURE — 80048 BASIC METABOLIC PNL TOTAL CA: CPT

## 2024-05-13 PROCEDURE — 87636 SARSCOV2 & INF A&B AMP PRB: CPT

## 2024-05-13 PROCEDURE — 36415 COLL VENOUS BLD VENIPUNCTURE: CPT

## 2024-05-13 PROCEDURE — 6370000000 HC RX 637 (ALT 250 FOR IP): Performed by: PHYSICIAN ASSISTANT

## 2024-05-13 PROCEDURE — 94761 N-INVAS EAR/PLS OXIMETRY MLT: CPT

## 2024-05-13 PROCEDURE — 84703 CHORIONIC GONADOTROPIN ASSAY: CPT

## 2024-05-13 PROCEDURE — 93005 ELECTROCARDIOGRAM TRACING: CPT | Performed by: PHYSICIAN ASSISTANT

## 2024-05-13 PROCEDURE — 84484 ASSAY OF TROPONIN QUANT: CPT

## 2024-05-13 RX ORDER — IPRATROPIUM BROMIDE AND ALBUTEROL SULFATE 2.5; .5 MG/3ML; MG/3ML
1 SOLUTION RESPIRATORY (INHALATION) ONCE
Status: COMPLETED | OUTPATIENT
Start: 2024-05-13 | End: 2024-05-13

## 2024-05-13 RX ORDER — GUAIFENESIN/DEXTROMETHORPHAN 100-10MG/5
5 SYRUP ORAL ONCE
Status: COMPLETED | OUTPATIENT
Start: 2024-05-13 | End: 2024-05-13

## 2024-05-13 RX ORDER — DEXTROMETHORPHAN POLISTIREX 30 MG/5ML
60 SUSPENSION ORAL 2 TIMES DAILY PRN
Qty: 89 ML | Refills: 0 | Status: SHIPPED | OUTPATIENT
Start: 2024-05-13 | End: 2024-05-23

## 2024-05-13 RX ORDER — DOXYCYCLINE HYCLATE 100 MG
100 TABLET ORAL 2 TIMES DAILY
Qty: 14 TABLET | Refills: 0 | Status: SHIPPED | OUTPATIENT
Start: 2024-05-13 | End: 2024-05-20

## 2024-05-13 RX ORDER — METHOCARBAMOL 750 MG/1
750 TABLET, FILM COATED ORAL 4 TIMES DAILY
Qty: 40 TABLET | Refills: 0 | Status: SHIPPED | OUTPATIENT
Start: 2024-05-13 | End: 2024-05-23

## 2024-05-13 RX ORDER — LIDOCAINE 4 G/G
1 PATCH TOPICAL DAILY
Status: DISCONTINUED | OUTPATIENT
Start: 2024-05-13 | End: 2024-05-13 | Stop reason: HOSPADM

## 2024-05-13 RX ORDER — METHOCARBAMOL 500 MG/1
750 TABLET, FILM COATED ORAL ONCE
Status: COMPLETED | OUTPATIENT
Start: 2024-05-13 | End: 2024-05-13

## 2024-05-13 RX ORDER — ACETAMINOPHEN 500 MG
1000 TABLET ORAL
Status: COMPLETED | OUTPATIENT
Start: 2024-05-13 | End: 2024-05-13

## 2024-05-13 RX ADMIN — GUAIFENESIN SYRUP AND DEXTROMETHORPHAN 5 ML: 100; 10 SYRUP ORAL at 10:46

## 2024-05-13 RX ADMIN — METHOCARBAMOL 750 MG: 500 TABLET ORAL at 12:48

## 2024-05-13 RX ADMIN — IPRATROPIUM BROMIDE AND ALBUTEROL SULFATE 1 DOSE: 2.5; .5 SOLUTION RESPIRATORY (INHALATION) at 12:23

## 2024-05-13 RX ADMIN — ACETAMINOPHEN 1000 MG: 500 TABLET ORAL at 10:46

## 2024-05-13 SDOH — ECONOMIC STABILITY: FOOD INSECURITY: WITHIN THE PAST 12 MONTHS, YOU WORRIED THAT YOUR FOOD WOULD RUN OUT BEFORE YOU GOT MONEY TO BUY MORE.: NEVER TRUE

## 2024-05-13 SDOH — ECONOMIC STABILITY: HOUSING INSECURITY: IN THE LAST 12 MONTHS, HOW MANY PLACES HAVE YOU LIVED?: 1

## 2024-05-13 SDOH — ECONOMIC STABILITY: INCOME INSECURITY: IN THE LAST 12 MONTHS, WAS THERE A TIME WHEN YOU WERE NOT ABLE TO PAY THE MORTGAGE OR RENT ON TIME?: NO

## 2024-05-13 SDOH — ECONOMIC STABILITY: FOOD INSECURITY: WITHIN THE PAST 12 MONTHS, THE FOOD YOU BOUGHT JUST DIDN'T LAST AND YOU DIDN'T HAVE MONEY TO GET MORE.: NEVER TRUE

## 2024-05-13 ASSESSMENT — PAIN DESCRIPTION - PAIN TYPE: TYPE: ACUTE PAIN

## 2024-05-13 ASSESSMENT — PAIN SCALES - GENERAL
PAINLEVEL_OUTOF10: 7
PAINLEVEL_OUTOF10: 6
PAINLEVEL_OUTOF10: 8
PAINLEVEL_OUTOF10: 8
PAINLEVEL_OUTOF10: 7

## 2024-05-13 ASSESSMENT — PAIN DESCRIPTION - DESCRIPTORS: DESCRIPTORS: ACHING

## 2024-05-13 ASSESSMENT — ENCOUNTER SYMPTOMS
COUGH: 1
NAUSEA: 0
ABDOMINAL PAIN: 0
SHORTNESS OF BREATH: 1
VOMITING: 0

## 2024-05-13 ASSESSMENT — PAIN DESCRIPTION - LOCATION: LOCATION: CHEST

## 2024-05-13 ASSESSMENT — PAIN DESCRIPTION - FREQUENCY: FREQUENCY: CONTINUOUS

## 2024-05-13 ASSESSMENT — LIFESTYLE VARIABLES
HOW OFTEN DO YOU HAVE A DRINK CONTAINING ALCOHOL: MONTHLY OR LESS
HOW MANY STANDARD DRINKS CONTAINING ALCOHOL DO YOU HAVE ON A TYPICAL DAY: 1 OR 2

## 2024-05-13 ASSESSMENT — PAIN - FUNCTIONAL ASSESSMENT: PAIN_FUNCTIONAL_ASSESSMENT: 0-10

## 2024-05-13 ASSESSMENT — SOCIAL DETERMINANTS OF HEALTH (SDOH): HOW HARD IS IT FOR YOU TO PAY FOR THE VERY BASICS LIKE FOOD, HOUSING, MEDICAL CARE, AND HEATING?: SOMEWHAT HARD

## 2024-05-13 NOTE — ED PROVIDER NOTES
ED Attending Attestation Note     Date of evaluation: 5/13/2024    This patient was seen by the advance practice provider.  I have seen and examined the patient, agree with the workup, evaluation, management and diagnosis. The care plan has been discussed.  I have reviewed the ECG and concur with the MANJIT's interpretation.  My assessment reveals a young female who presents with ongoing cough.  She states that she coughs so hard that she sometimes vomits.  She states that she was seen in urgent care last week and started antibiotics.  She went back today for recheck and they told her that they were concerned about her lung sounds and wanted her to get checked out.  On exam she is speaking in full sentences.  Lungs are grossly clear.     Radha Brown MD  05/13/24 9251     DISPLAY PLAN FREE TEXT

## 2024-05-13 NOTE — ED PROVIDER NOTES
[facility]  EMERGENCY DEPARTMENT ENCOUNTER          PHYSICIAN ASSISTANT NOTE     Date of evaluation: 2024    Chief Complaint     Cough and Otalgia (Pt to ED with complaint of cough and bilateral ear pain. Pt states she went to urgent care on Friday and was given abx for bronchitis and for ear infection, but not getting any better. Pt states she still has a cough and doesn't feel any better after abx. Endorses chills. )    History of Present Illness     Mell Vicente is a 28 y.o. 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  Patient's symptoms been ongoing since last Tuesday.  She endorses bilateral ear pain, feeling short of breath with a cough that is mostly dry but intermittently productive.  She also has had subjective fevers and chills at home as well as some chest tightness and having nasal congestion.  She went to an urgent care on Friday where she was placed on a Z-Vadim, naproxen, prednisone, Tessalon Perles and Flexeril.  She denies any significant improvement of symptoms which is why she decided to present today.  She denies any history of asthma or COPD.  Patient does endorse a history of PE that was reportedly provoked by Nexplanon, this has been removed and she is no longer on anticoagulation.  Additionally she states this chest tightness feel substantially different than her chest pain that she had when she had her PE.    With the exception of the above, there are no aggravating or alleviating factors.    Past Medical, Surgical, Family, and Social History     She has a past medical history of Anxiety, Depressive disorder, History of pulmonary embolism, Hx of blood clots, Hypertension, Migraines, Psychosis (HCC), and Smoker.  She has a past surgical history that includes Cholecystectomy, laparoscopic (2017);  section (2011);  section (); Tubal ligation;

## 2024-05-13 NOTE — ED NOTES
[employer] member was screened for SDOH/SBIRT. Pt reported it is somewhat hard to pay for basic needs and was concerned in the increase in price of her utilities. Pt was provided resources for rent/utility assistance and food pantries.      Sherita Nassar  05/13/24 4016

## 2024-05-13 NOTE — DISCHARGE INSTRUCTIONS
ED Course     Today, you were seen in the Emergency Department.  You have been diagnosed with:   1. Acute upper respiratory infection      Disposition/Plan     IMPORTANT INSTRUCTIONS:  Your workup here was reassuring.  To help ensure that you do not have bacterial pneumonia please take the prescribed antibiotic, doxycycline, until it is gone even if you begin to feel better.  You can take the prescribed muscle relaxer, Robaxin to see if this helps with the aching in her back from her coughing.  You can use the prescribed cough syrup to help with your cough as well.    Upper Respiratory Infection    An upper respiratory infection is also called the common cold. It is an infection that can affect your nose, throat, ears, and sinuses. For healthy people, the common cold is usually not serious and does not need special treatment. Cold symptoms are usually worst for the first 3 to 5 days. Most people get better in 7 to 14 days. You may continue to cough for 2 to 3 weeks. Colds are caused by viruses and do not get better with antibiotics.    INSTRUCTIONS:  Self-care:  Rest: Rest until your fever is gone and you feel better.  Stay hydrated: Drink 8 to 10 glasses (10oz.) of liquids such as water, ginger ale, tea or fruit juices each day. If you are vomiting, good fluids include diluted decaffeinated sports drinks. The water, sugars, and salts in these drinks can help prevent dehydration.  Gargle: Gargle with warm salt water to help your sore throat feel better. Make salt water by adding ¼ teaspoon salt to 1 cup warm water. You may also suck on hard candy or throat lozenges. You may use a sore throat spray.  Saline nasal drops or nasal washes (Neti Pot, saline nasal sprays, etc.): These can help relieve your congestion. They can be bought without a prescription.  Take a warm bath or shower: This may help decrease body aches and help you breathe easier.  Use a cool-mist humidifier: This will increase air moisture and make

## 2024-10-03 ENCOUNTER — HOSPITAL ENCOUNTER (EMERGENCY)
Age: 29
Discharge: HOME | End: 2024-10-03
Attending: EMERGENCY MEDICINE
Payer: MEDICAID

## 2024-10-03 VITALS
BODY MASS INDEX: 31.51 KG/M2 | OXYGEN SATURATION: 99 % | RESPIRATION RATE: 16 BRPM | HEIGHT: 64 IN | DIASTOLIC BLOOD PRESSURE: 85 MMHG | TEMPERATURE: 98.4 F | SYSTOLIC BLOOD PRESSURE: 131 MMHG | WEIGHT: 184.6 LBS | HEART RATE: 73 BPM

## 2024-10-03 DIAGNOSIS — N76.0 BV (BACTERIAL VAGINOSIS): Primary | ICD-10-CM

## 2024-10-03 DIAGNOSIS — B96.89 BV (BACTERIAL VAGINOSIS): Primary | ICD-10-CM

## 2024-10-03 LAB
BACTERIA GENITAL QL WET PREP: ABNORMAL
BILIRUB UR QL STRIP.AUTO: NEGATIVE
CLARITY UR: CLEAR
CLUE CELLS SPEC QL WET PREP: ABNORMAL
COLOR UR: YELLOW
EPI CELLS SPEC QL WET PREP: ABNORMAL
GLUCOSE UR STRIP.AUTO-MCNC: NEGATIVE MG/DL
HCG UR QL: NEGATIVE
HGB UR QL STRIP.AUTO: NEGATIVE
KETONES UR STRIP.AUTO-MCNC: NEGATIVE MG/DL
LEUKOCYTE ESTERASE UR QL STRIP.AUTO: NEGATIVE
NITRITE UR QL STRIP.AUTO: NEGATIVE
PH UR STRIP.AUTO: 5.5 [PH] (ref 5–8)
PROT UR STRIP.AUTO-MCNC: NEGATIVE MG/DL
RBC SPEC QL WET PREP: ABNORMAL
SP GR UR STRIP.AUTO: <=1.005 (ref 1–1.03)
SPECIMEN SOURCE FLD: ABNORMAL
T VAGINALIS GENITAL QL WET PREP: ABNORMAL
UA DIPSTICK W REFLEX MICRO PNL UR: NORMAL
URN SPEC COLLECT METH UR: NORMAL
UROBILINOGEN UR STRIP-ACNC: 0.2 E.U./DL
WBC SPEC QL WET PREP: ABNORMAL
YEAST GENITAL QL WET PREP: ABNORMAL

## 2024-10-03 PROCEDURE — 99283 EMERGENCY DEPT VISIT LOW MDM: CPT

## 2024-10-03 PROCEDURE — 87491 CHLMYD TRACH DNA AMP PROBE: CPT

## 2024-10-03 PROCEDURE — 87210 SMEAR WET MOUNT SALINE/INK: CPT

## 2024-10-03 PROCEDURE — 81003 URINALYSIS AUTO W/O SCOPE: CPT

## 2024-10-03 PROCEDURE — 87591 N.GONORRHOEAE DNA AMP PROB: CPT

## 2024-10-03 PROCEDURE — 84703 CHORIONIC GONADOTROPIN ASSAY: CPT

## 2024-10-03 RX ORDER — CEPHALEXIN 500 MG/1
500 CAPSULE ORAL 4 TIMES DAILY
COMMUNITY

## 2024-10-03 RX ORDER — METRONIDAZOLE 500 MG/1
500 TABLET ORAL 3 TIMES DAILY
Qty: 21 TABLET | Refills: 0 | Status: SHIPPED | OUTPATIENT
Start: 2024-10-03 | End: 2024-10-10

## 2024-10-03 ASSESSMENT — ENCOUNTER SYMPTOMS
DIARRHEA: 0
RHINORRHEA: 0
EYE REDNESS: 0
STRIDOR: 0
SHORTNESS OF BREATH: 0
SINUS PRESSURE: 0
RECTAL PAIN: 0
BLOOD IN STOOL: 0
APNEA: 0
CONSTIPATION: 0
PHOTOPHOBIA: 0
ABDOMINAL DISTENTION: 0
BACK PAIN: 0
SORE THROAT: 0
WHEEZING: 0
ABDOMINAL PAIN: 0
NAUSEA: 0
TROUBLE SWALLOWING: 0
COLOR CHANGE: 0
EYE DISCHARGE: 0
VOICE CHANGE: 0
EYE PAIN: 0
COUGH: 0
VOMITING: 0
CHEST TIGHTNESS: 0

## 2024-10-03 ASSESSMENT — PAIN DESCRIPTION - FREQUENCY: FREQUENCY: CONTINUOUS

## 2024-10-03 ASSESSMENT — PAIN DESCRIPTION - PAIN TYPE: TYPE: ACUTE PAIN

## 2024-10-03 ASSESSMENT — PAIN - FUNCTIONAL ASSESSMENT: PAIN_FUNCTIONAL_ASSESSMENT: 0-10

## 2024-10-03 ASSESSMENT — PAIN DESCRIPTION - DESCRIPTORS: DESCRIPTORS: BURNING

## 2024-10-03 ASSESSMENT — PAIN DESCRIPTION - LOCATION: LOCATION: VAGINA;BACK

## 2024-10-03 NOTE — ED NOTES
Patient to ed with concerns of a continued uti and vaginal burning which started several days ago patient reports new sexual partner.

## 2024-10-03 NOTE — DISCHARGE INSTRUCTIONS
Your gonorrhea and chlamydia results will be available on [facility] in 48-72 hours. We will call you if antibiotic treatment is indicated.   Do not drink alcohol while taking Flagyl due to risk of nausea/vomiting.   Return if symptoms change or worsen.

## 2024-10-03 NOTE — DISCHARGE INSTR - COC
Continuity of Care Form    Patient Name: Mell Vicente   :  1995  MRN:  [id number]    Admit date:  10/3/2024  Discharge date:  ***    Code Status Order: Prior   Advance Directives:   Advance Care Flowsheet Documentation             Admitting Physician:  No admitting provider for patient encounter.  PCP: No, Pcp    Discharging Nurse: ***  Discharging Hospital Unit/Room#:   Discharging Unit Phone Number: ***    Emergency Contact:   Extended Emergency Contact Information  Primary Emergency Contact: NERISSA CUNHA  Mobile Phone: [phone]  Relation: Grandparent    Past Surgical History:  Past Surgical History:   Procedure Laterality Date     SECTION  11/28/2011    X2, 2017 and      SECTION  2017    CHOLECYSTECTOMY, LAPAROSCOPIC  2017    ROBOTIC XI ASSISTED LAPAROSCOPIC CHOLECYSTECTOMY WITH CHOLANGIOGRAM    LAPAROSCOPY N/A 2021    ROBOTIC ASSISTED RESECTION OF ENDOMETRIOSIS - CO2 LASER performed by Jonathan Escalona MD at [facility] OR    TUBAL LIGATION      UPPER GASTROINTESTINAL ENDOSCOPY N/A 2021    EGD DIAGNOSTIC ONLY performed by Tian Taylor MD at [facility] ENDOSCOPY       Immunization History:   Immunization History   Administered Date(s) Administered    Influenza Virus Vaccine 02/10/2016    MMR, PRIORIX, M-M-R II, (age 12m+), SC, 0.5mL 2016    TDaP, ADACEL (age 10y-64y), BOOSTRIX (age 10y+), IM, 0.5mL 2016       Active Problems:  Patient Active Problem List   Diagnosis Code    History of  delivery Z98.891    Menorrhagia with irregular cycle N92.1    Pulmonary embolism without acute cor pulmonale ([facility]) I26.99    Cannabis use disorder, moderate, dependence ([facility]) F12.20    Psychosis, paranoid ([facility]) F22    Tobacco use disorder F17.200    History of sexually transmitted disease Z86.19    Depressive disorder F32.A    Anxiety F41.9    Insomnia G47.00    Dysfunctional uterine bleeding N93.8    Endometriosis N80.9    Post-operative nausea and

## 2024-10-03 NOTE — ED PROVIDER NOTES
Mell Vicente is a 29 year old female who has been experiencing vaginal discharge and irritation x3 days. She went to Urgent Care where she was diagnosed as having a UTI. She was given Cephalexin which she has taken 4. She has not yet heard about her gonorrhea and chlamydia results. She states she has a new male sex partner, but is not known to have any direct exposure to STD. She reports lower back pain, but denies urinary symptoms or fever.      /85   Pulse 73   Temp 98.4 °F (36.9 °C) (Oral)   Resp 16   Ht 1.626 m (5' 4\")   Wt 83.7 kg (184 lb 9.6 oz)   SpO2 99%   BMI 31.69 kg/m²     I have reviewed the following from the nursing documentation:      Prior to Admission medications    Medication Sig Start Date End Date Taking? Authorizing Provider   cephALEXin (KEFLEX) 500 MG capsule Take 1 capsule by mouth 4 times daily   Yes Rashaad Reid MD   metroNIDAZOLE (FLAGYL) 500 MG tablet Take 1 tablet by mouth 3 times daily for 7 days 10/3/24 10/10/24 Yes Deborah Henderson MD   propranolol (INDERAL) 10 MG tablet Take 1 tablet by mouth 2 times daily 12/8/23  Yes Fran Reid MD   amphetamine-dextroamphetamine (ADDERALL XR) 10 MG extended release capsule Take 1 capsule by mouth in the morning and 1 capsule in the evening. 2/25/21  Yes Rashaad Reid MD   acetaminophen (TYLENOL) 500 MG tablet Take 1 tablet by mouth 4 times daily as needed for Pain 6/2/21 12/8/23  Jonathan Escalona MD   lithium 300 MG capsule Take 1 capsule by mouth 4 times daily 6/9/20   Rashaad Reid MD       Allergies as of 10/03/2024 - Fully Reviewed 10/03/2024   Allergen Reaction Noted    Etonogestrel Other (See Comments) 02/07/2018    Naprosyn [naproxen] Hives, Swelling, and Rash 10/08/2012    Pcn [penicillins] Swelling and Other (See Comments) 03/08/2023    Morphine Hives, Itching, and Rash 06/20/2017    Toradol [ketorolac tromethamine] Itching 08/13/2019       Past Medical History:   Diagnosis Date     discharge disposition reasonable. Also, there is no evidence or peritonitis, sepsis, or toxicity. Mell Vicente and I have discussed the diagnosis and risks, and we agree with discharging home to follow-up with their primary doctor. We also discussed returning to the Emergency Department immediately if new or worsening symptoms occur. We have discussed the symptoms which are most concerning (e.g., bloody stool, fever, changing or worsening pain, vomiting) that necessitate immediate return.     Final Impression    1. BV (bacterial vaginosis)        Blood pressure 131/85, pulse 73, temperature 98.4 °F (36.9 °C), temperature source Oral, resp. rate 16, height 1.626 m (5' 4\"), weight 83.7 kg (184 lb 9.6 oz), SpO2 99%, not currently breastfeeding.       Deborah Henderson MD  10/03/24 [id number]

## 2024-10-04 LAB
C TRACH DNA CVX QL NAA+PROBE: NEGATIVE
N GONORRHOEA DNA CERV MUCUS QL NAA+PROBE: NEGATIVE

## 2024-10-15 NOTE — PROGRESS NOTES
[FreeTextEntry1] : Hypertension: Well controlled. Continue current medication: Losartan 100 mg once a day.  I reviewed the report from [facility] from 5/7/2024 which showed no fracture of her left wrist.  It did however show advanced degenerative changes I also reviewed the shoulder report from the same date it showed evidence of bilateral shoulder replacements with mild AC joint osteoarthritis I also reviewed the report of the CT of the head which shows severe generalized cerebral atrophy with evidence of chronic small vessel ischemic changes a small day stable hyper dense partially calcified lesion was noted in the left temporal lobe.  The patient is aware of this and this was noted on previous scans at least 5 years ago. Low salt diet.  I have spent a total of 35 minutes on the day of the visit both face to face and non-face to face with the patient.
[x] Appears well-developed and well-nourished [x] No apparent distress      [] Abnormal-   Mental status  [x] Alert and awake  [x] Oriented to person/place/time [x]Able to follow commands      Eyes:  EOM    [x]  Normal  [] Abnormal-  Sclera  [x]  Normal  [] Abnormal -         Discharge [x]  None visible  [] Abnormal -    HENT:   [x] Normocephalic, atraumatic. [] Abnormal   [] Mouth/Throat: Mucous membranes are moist.     External Ears [] Normal  [] Abnormal-     Neck: [x] No visualized mass     Pulmonary/Chest: [x] Respiratory effort normal.  [x] No visualized signs of difficulty breathing or respiratory distress        [] Abnormal-      Musculoskeletal:   [] Normal gait with no signs of ataxia         [] Normal range of motion of neck        [] Abnormal-       Neurological:        [x] No Facial Asymmetry (Cranial nerve 7 motor function) (limited exam to video visit)          [] No gaze palsy        [] Abnormal-         Skin:        [x] No significant exanthematous lesions or discoloration noted on facial skin         [] Abnormal-            Psychiatric:       [x] Normal Affect [] No Hallucinations        [] Abnormal-     Other pertinent observable physical exam findings-     ASSESSMENT/PLAN:  There are no diagnoses linked to this encounter. Diagnosis Orders   1. Epigastric pain     2. Myalgia       [facility]/ covid testing  ER if worsening sob  Tylenol prn  protonix 40 daily for possible melena w/ epigastric pain  Hydrate well and consider further labs if fails to improve pending covid 19 testing  Quarantine from others dw/ pt    No follow-ups on file. Estrella Mckeon is a 25 y.o. female being evaluated by a Virtual Visit (video visit) encounter to address concerns as mentioned above. A caregiver was present when appropriate.  Due to this being a TeleHealth encounter (During EDLQY-70 public health emergency), evaluation of the following organ systems was limited:

## 2025-04-30 ENCOUNTER — HOSPITAL ENCOUNTER (EMERGENCY)
Age: 30
Discharge: HOME | End: 2025-04-30
Attending: STUDENT IN AN ORGANIZED HEALTH CARE EDUCATION/TRAINING PROGRAM
Payer: MEDICAID

## 2025-04-30 VITALS
RESPIRATION RATE: 20 BRPM | SYSTOLIC BLOOD PRESSURE: 120 MMHG | BODY MASS INDEX: 32.98 KG/M2 | WEIGHT: 193.2 LBS | OXYGEN SATURATION: 100 % | DIASTOLIC BLOOD PRESSURE: 82 MMHG | HEART RATE: 53 BPM | TEMPERATURE: 99.1 F | HEIGHT: 64 IN

## 2025-04-30 DIAGNOSIS — G44.89 OTHER HEADACHE SYNDROME: ICD-10-CM

## 2025-04-30 DIAGNOSIS — R21 RASH AND OTHER NONSPECIFIC SKIN ERUPTION: Primary | ICD-10-CM

## 2025-04-30 LAB
ANION GAP SERPL CALCULATED.3IONS-SCNC: 10 MMOL/L (ref 3–16)
BASOPHILS # BLD: 0.1 K/UL (ref 0–0.2)
BASOPHILS NFR BLD: 0.8 %
BUN SERPL-MCNC: 8 MG/DL (ref 7–20)
CALCIUM SERPL-MCNC: 9.7 MG/DL (ref 8.3–10.6)
CHLORIDE SERPL-SCNC: 107 MMOL/L (ref 99–110)
CO2 SERPL-SCNC: 23 MMOL/L (ref 21–32)
CREAT SERPL-MCNC: 0.9 MG/DL (ref 0.6–1.1)
DEPRECATED RDW RBC AUTO: 12.8 % (ref 12.4–15.4)
EOSINOPHIL # BLD: 0.5 K/UL (ref 0–0.6)
EOSINOPHIL NFR BLD: 5.3 %
GFR SERPLBLD CREATININE-BSD FMLA CKD-EPI: 88 ML/MIN/{1.73_M2}
GLUCOSE SERPL-MCNC: 102 MG/DL (ref 70–99)
HCT VFR BLD AUTO: 40.3 % (ref 36–48)
HGB BLD-MCNC: 14 G/DL (ref 12–16)
LYMPHOCYTES # BLD: 2.7 K/UL (ref 1–5.1)
LYMPHOCYTES NFR BLD: 26.4 %
MCH RBC QN AUTO: 30.9 PG (ref 26–34)
MCHC RBC AUTO-ENTMCNC: 34.7 G/DL (ref 31–36)
MCV RBC AUTO: 88.9 FL (ref 80–100)
MONOCYTES # BLD: 0.7 K/UL (ref 0–1.3)
MONOCYTES NFR BLD: 7 %
NEUTROPHILS # BLD: 6.1 K/UL (ref 1.7–7.7)
NEUTROPHILS NFR BLD: 60.5 %
PLATELET # BLD AUTO: 285 K/UL (ref 135–450)
PMV BLD AUTO: 9.1 FL (ref 5–10.5)
POTASSIUM SERPL-SCNC: 3.6 MMOL/L (ref 3.5–5.1)
RBC # BLD AUTO: 4.53 M/UL (ref 4–5.2)
SODIUM SERPL-SCNC: 140 MMOL/L (ref 136–145)
WBC # BLD AUTO: 10.1 K/UL (ref 4–11)

## 2025-04-30 PROCEDURE — 2580000003 HC RX 258: Performed by: NURSE PRACTITIONER

## 2025-04-30 PROCEDURE — 6360000002 HC RX W HCPCS: Performed by: NURSE PRACTITIONER

## 2025-04-30 PROCEDURE — 96365 THER/PROPH/DIAG IV INF INIT: CPT

## 2025-04-30 PROCEDURE — 99284 EMERGENCY DEPT VISIT MOD MDM: CPT

## 2025-04-30 PROCEDURE — 85025 COMPLETE CBC W/AUTO DIFF WBC: CPT

## 2025-04-30 PROCEDURE — 96375 TX/PRO/DX INJ NEW DRUG ADDON: CPT

## 2025-04-30 PROCEDURE — 80048 BASIC METABOLIC PNL TOTAL CA: CPT

## 2025-04-30 PROCEDURE — 6370000000 HC RX 637 (ALT 250 FOR IP): Performed by: NURSE PRACTITIONER

## 2025-04-30 RX ORDER — PREDNISONE 20 MG/1
40 TABLET ORAL ONCE
Status: COMPLETED | OUTPATIENT
Start: 2025-04-30 | End: 2025-04-30

## 2025-04-30 RX ORDER — SODIUM CHLORIDE, SODIUM LACTATE, POTASSIUM CHLORIDE, AND CALCIUM CHLORIDE .6; .31; .03; .02 G/100ML; G/100ML; G/100ML; G/100ML
1000 INJECTION, SOLUTION INTRAVENOUS ONCE
Status: COMPLETED | OUTPATIENT
Start: 2025-04-30 | End: 2025-04-30

## 2025-04-30 RX ORDER — ACETAMINOPHEN 325 MG/1
650 TABLET ORAL ONCE
Status: COMPLETED | OUTPATIENT
Start: 2025-04-30 | End: 2025-04-30

## 2025-04-30 RX ORDER — PREDNISONE 20 MG/1
TABLET ORAL
Qty: 8 TABLET | Refills: 0 | Status: SHIPPED | OUTPATIENT
Start: 2025-05-01 | End: 2025-05-10

## 2025-04-30 RX ORDER — MAGNESIUM SULFATE IN WATER 40 MG/ML
2000 INJECTION, SOLUTION INTRAVENOUS ONCE
Status: COMPLETED | OUTPATIENT
Start: 2025-04-30 | End: 2025-04-30

## 2025-04-30 RX ORDER — DIPHENHYDRAMINE HYDROCHLORIDE 50 MG/ML
25 INJECTION, SOLUTION INTRAMUSCULAR; INTRAVENOUS ONCE
Status: COMPLETED | OUTPATIENT
Start: 2025-04-30 | End: 2025-04-30

## 2025-04-30 RX ADMIN — ACETAMINOPHEN 650 MG: 325 TABLET ORAL at 18:36

## 2025-04-30 RX ADMIN — SODIUM CHLORIDE, SODIUM LACTATE, POTASSIUM CHLORIDE, AND CALCIUM CHLORIDE 1000 ML: .6; .31; .03; .02 INJECTION, SOLUTION INTRAVENOUS at 18:43

## 2025-04-30 RX ADMIN — DIPHENHYDRAMINE HYDROCHLORIDE 25 MG: 50 INJECTION INTRAMUSCULAR; INTRAVENOUS at 19:46

## 2025-04-30 RX ADMIN — MAGNESIUM SULFATE HEPTAHYDRATE 2000 MG: 40 INJECTION, SOLUTION INTRAVENOUS at 18:50

## 2025-04-30 RX ADMIN — PREDNISONE 40 MG: 20 TABLET ORAL at 19:48

## 2025-04-30 ASSESSMENT — PAIN DESCRIPTION - FREQUENCY: FREQUENCY: CONTINUOUS

## 2025-04-30 ASSESSMENT — ENCOUNTER SYMPTOMS
RESPIRATORY NEGATIVE: 1
GASTROINTESTINAL NEGATIVE: 1
PHOTOPHOBIA: 1

## 2025-04-30 ASSESSMENT — PAIN - FUNCTIONAL ASSESSMENT: PAIN_FUNCTIONAL_ASSESSMENT: 0-10

## 2025-04-30 ASSESSMENT — PAIN DESCRIPTION - LOCATION: LOCATION: HEAD

## 2025-04-30 ASSESSMENT — PAIN DESCRIPTION - DESCRIPTORS: DESCRIPTORS: ACHING

## 2025-04-30 ASSESSMENT — PAIN DESCRIPTION - PAIN TYPE: TYPE: ACUTE PAIN

## 2025-04-30 ASSESSMENT — PAIN SCALES - GENERAL: PAINLEVEL_OUTOF10: 10

## 2025-04-30 NOTE — ED PROVIDER NOTES
ED Attending Attestation Note     Date of evaluation: 4/30/2025    I have discussed the case with the MANJIT. I have personally performed a history, physical exam, and my own medical decision making. I have reviewed the note and agree with the findings and plan.     INITIAL VITALS: BP: (!) 116/100, Temp: 99.1 °F (37.3 °C), Pulse: 71, Respirations: 18, SpO2: 100 %   Physical Exam  Vitals reviewed.   Skin:     Comments: Patchy erythema with excoriations on bilateral lower extremity   Neurological:      Mental Status: She is alert.         MDM:  My assessment reveals a well-appearing 29-year-old female presenting with a pruritic rash on both of her legs.  She has been seen at an outside emergency department as well as a dermatologist for this rash.  She was prescribed Keflex and an antifungal cream by the outside hospital which did not improve her symptoms.  She reports that the dermatologist told her that they did not know what this rash was and told her to come to the hospital for blood work.  On examination, she has clear excoriations to her erythema on her legs.  No evidence of cellulitis or purulent discharge.  Given the paretic nature of her rash, will prescribe the patient a course of steroids and referral to a different dermatologist       Rito Vergara MD  04/30/25 [id number]

## 2025-04-30 NOTE — ED PROVIDER NOTES
THE [facility]  EMERGENCY DEPARTMENT ENCOUNTER          NURSE PRACTITIONER NOTE       Date of evaluation: 4/30/2025      Chief Complaint     Rash (Bilateral leg rash since early April worsening )      History of Present Illness     Mell Vicente is a 29 y.o. female with a past medical history as noted below; who presents to the emergency department with a complaint of ongoing headaches, sensitivity to light and sound, intermittent dizziness/lightheadedness as well as a rash to bilateral lower extremities.  Patient states symptoms have been ongoing since early April.  Patient reports she was seen at an outside facility on April 12, and was given an antifungal medication for her legs, as well as itching medication and states that these did not help they actually made her symptoms worse.  Patient states that she is itching so much that she causes her self to bleed in the middle of the night.  She saw her dermatologist today who she reports told her that her legs look \"infected\" and that she should go to the emergency department.  She has been dealing with daily headaches, states they change locations, sometimes are behind her eyes, sometimes not.  She does have a documented history of migraines.  States that when she was seen at the outside facility she was prescribed Percocet initially due to an infection behind her right ear, states this was helping with her headaches however over-the-counter medications have not been helping.  She endorses low-grade fevers as well as chills and sweats at home.  Denies chance of pregnancy, reports last sexually active over 1 year ago.    She has a past medical history of Anxiety, Depressive disorder, History of pulmonary embolism, Hx of blood clots, Hypertension, Migraines, Psychosis (HCC), and Smoker.    ASSESSMENT / PLAN  (MEDICAL DECISION MAKING)     INITIAL VITALS: BP: (!) 116/100, Temp: 99.1 °F (37.3 °C), Pulse: 71, Respirations: 18, SpO2: 100 %     Mell PRESCOTT

## 2025-04-30 NOTE — ED TRIAGE NOTES
Pt reports bilateral leg rash since early April that has been worsening. Pt reports being seen at [facility] ED and was given antibiotic and dermatology referral. Pt reports fu with dermatologist and \"they couldn't help her. It is an infection\". Pt reports worsening symptoms since with HA, sensitivity to light, dizziness, fatigue. Pt requesting blood work including HIV and Hep C bloodwork but denies sexual activity. Pt reports just wanting to find a cause.

## 2025-05-01 NOTE — DISCHARGE INSTRUCTIONS
- take entire course of steroids (take another dose tomorrow)    - follow up with dermatology for further evaluation and management    - take benadryl (25mg/one over the counter tablet) every 6-8 hours as needed for itching; this will cause drowsiness, recommend using at night    - return for worsening symptoms/concerns

## 2025-07-22 ENCOUNTER — APPOINTMENT (OUTPATIENT)
Dept: GENERAL RADIOLOGY | Age: 30
End: 2025-07-22
Payer: MEDICAID

## 2025-07-22 ENCOUNTER — HOSPITAL ENCOUNTER (EMERGENCY)
Age: 30
Discharge: HOME | End: 2025-07-22
Attending: STUDENT IN AN ORGANIZED HEALTH CARE EDUCATION/TRAINING PROGRAM
Payer: MEDICAID

## 2025-07-22 VITALS
SYSTOLIC BLOOD PRESSURE: 140 MMHG | TEMPERATURE: 98.6 F | WEIGHT: 188.71 LBS | RESPIRATION RATE: 18 BRPM | HEIGHT: 64 IN | HEART RATE: 68 BPM | BODY MASS INDEX: 32.22 KG/M2 | DIASTOLIC BLOOD PRESSURE: 92 MMHG | OXYGEN SATURATION: 100 %

## 2025-07-22 DIAGNOSIS — J40 BRONCHITIS: Primary | ICD-10-CM

## 2025-07-22 LAB
ALBUMIN SERPL-MCNC: 4.3 G/DL (ref 3.4–5)
ALBUMIN/GLOB SERPL: 2 {RATIO} (ref 1.1–2.2)
ALP SERPL-CCNC: 58 U/L (ref 40–129)
ALT SERPL-CCNC: 13 U/L (ref 10–40)
ANION GAP SERPL CALCULATED.3IONS-SCNC: 9 MMOL/L (ref 3–16)
AST SERPL-CCNC: 14 U/L (ref 15–37)
BASOPHILS # BLD: 0.1 K/UL (ref 0–0.2)
BASOPHILS NFR BLD: 0.5 %
BILIRUB SERPL-MCNC: 0.7 MG/DL (ref 0–1)
BUN SERPL-MCNC: 3 MG/DL (ref 7–20)
CALCIUM SERPL-MCNC: 9.7 MG/DL (ref 8.3–10.6)
CHLORIDE SERPL-SCNC: 111 MMOL/L (ref 99–110)
CO2 SERPL-SCNC: 22 MMOL/L (ref 21–32)
CREAT SERPL-MCNC: 0.8 MG/DL (ref 0.6–1.1)
D-DIMER QUANTITATIVE: 0.38 UG/ML FEU (ref 0–0.6)
DEPRECATED RDW RBC AUTO: 13.4 % (ref 12.4–15.4)
EKG ATRIAL RATE: 51 BPM
EKG DIAGNOSIS: NORMAL
EKG P AXIS: 58 DEGREES
EKG P-R INTERVAL: 186 MS
EKG Q-T INTERVAL: 428 MS
EKG QRS DURATION: 92 MS
EKG QTC CALCULATION (BAZETT): 394 MS
EKG R AXIS: 7 DEGREES
EKG T AXIS: 38 DEGREES
EKG VENTRICULAR RATE: 51 BPM
EOSINOPHIL # BLD: 0.2 K/UL (ref 0–0.6)
EOSINOPHIL NFR BLD: 1.8 %
FLUAV RNA RESP QL NAA+PROBE: NOT DETECTED
FLUBV RNA RESP QL NAA+PROBE: NOT DETECTED
GFR SERPLBLD CREATININE-BSD FMLA CKD-EPI: >90 ML/MIN/{1.73_M2}
GLUCOSE SERPL-MCNC: 95 MG/DL (ref 70–99)
HCG SERPL QL: NEGATIVE
HCT VFR BLD AUTO: 40.4 % (ref 36–48)
HGB BLD-MCNC: 14.2 G/DL (ref 12–16)
LYMPHOCYTES # BLD: 1.7 K/UL (ref 1–5.1)
LYMPHOCYTES NFR BLD: 12.5 %
MAGNESIUM SERPL-MCNC: 2.15 MG/DL (ref 1.8–2.4)
MCH RBC QN AUTO: 31.8 PG (ref 26–34)
MCHC RBC AUTO-ENTMCNC: 35.1 G/DL (ref 31–36)
MCV RBC AUTO: 90.8 FL (ref 80–100)
MONOCYTES # BLD: 0.7 K/UL (ref 0–1.3)
MONOCYTES NFR BLD: 5.5 %
NEUTROPHILS # BLD: 10.7 K/UL (ref 1.7–7.7)
NEUTROPHILS NFR BLD: 79.7 %
NT-PROBNP SERPL-MCNC: 243 PG/ML (ref 0–124)
PLATELET # BLD AUTO: 225 K/UL (ref 135–450)
PMV BLD AUTO: 9 FL (ref 5–10.5)
POTASSIUM SERPL-SCNC: 3.5 MMOL/L (ref 3.5–5.1)
PROT SERPL-MCNC: 6.4 G/DL (ref 6.4–8.2)
RBC # BLD AUTO: 4.45 M/UL (ref 4–5.2)
SARS-COV-2 RNA RESP QL NAA+PROBE: NOT DETECTED
SODIUM SERPL-SCNC: 142 MMOL/L (ref 136–145)
TROPONIN, HIGH SENSITIVITY: <6 NG/L (ref 0–14)
WBC # BLD AUTO: 13.4 K/UL (ref 4–11)

## 2025-07-22 PROCEDURE — 85025 COMPLETE CBC W/AUTO DIFF WBC: CPT

## 2025-07-22 PROCEDURE — 93005 ELECTROCARDIOGRAM TRACING: CPT | Performed by: STUDENT IN AN ORGANIZED HEALTH CARE EDUCATION/TRAINING PROGRAM

## 2025-07-22 PROCEDURE — 6360000002 HC RX W HCPCS: Performed by: EMERGENCY MEDICINE

## 2025-07-22 PROCEDURE — 84703 CHORIONIC GONADOTROPIN ASSAY: CPT

## 2025-07-22 PROCEDURE — 83880 ASSAY OF NATRIURETIC PEPTIDE: CPT

## 2025-07-22 PROCEDURE — 36415 COLL VENOUS BLD VENIPUNCTURE: CPT

## 2025-07-22 PROCEDURE — 87636 SARSCOV2 & INF A&B AMP PRB: CPT

## 2025-07-22 PROCEDURE — 6370000000 HC RX 637 (ALT 250 FOR IP): Performed by: EMERGENCY MEDICINE

## 2025-07-22 PROCEDURE — 71046 X-RAY EXAM CHEST 2 VIEWS: CPT

## 2025-07-22 PROCEDURE — 85379 FIBRIN DEGRADATION QUANT: CPT

## 2025-07-22 PROCEDURE — 2580000003 HC RX 258: Performed by: EMERGENCY MEDICINE

## 2025-07-22 PROCEDURE — 99285 EMERGENCY DEPT VISIT HI MDM: CPT

## 2025-07-22 PROCEDURE — 83735 ASSAY OF MAGNESIUM: CPT

## 2025-07-22 PROCEDURE — 80053 COMPREHEN METABOLIC PANEL: CPT

## 2025-07-22 PROCEDURE — 94640 AIRWAY INHALATION TREATMENT: CPT

## 2025-07-22 PROCEDURE — 84484 ASSAY OF TROPONIN QUANT: CPT

## 2025-07-22 RX ORDER — ALBUTEROL SULFATE 0.83 MG/ML
5 SOLUTION RESPIRATORY (INHALATION) ONCE
Status: COMPLETED | OUTPATIENT
Start: 2025-07-22 | End: 2025-07-22

## 2025-07-22 RX ORDER — NICOTINE 21 MG/24HR
1 PATCH, TRANSDERMAL 24 HOURS TRANSDERMAL DAILY
Qty: 14 PATCH | Refills: 0 | Status: SHIPPED | OUTPATIENT
Start: 2025-07-22 | End: 2025-07-25 | Stop reason: SDUPTHER

## 2025-07-22 RX ORDER — IPRATROPIUM BROMIDE AND ALBUTEROL SULFATE 2.5; .5 MG/3ML; MG/3ML
1 SOLUTION RESPIRATORY (INHALATION) ONCE
Status: COMPLETED | OUTPATIENT
Start: 2025-07-22 | End: 2025-07-22

## 2025-07-22 RX ORDER — ACETAMINOPHEN 500 MG
1000 TABLET ORAL ONCE
Status: COMPLETED | OUTPATIENT
Start: 2025-07-22 | End: 2025-07-22

## 2025-07-22 RX ORDER — BENZONATATE 100 MG/1
100 CAPSULE ORAL 3 TIMES DAILY PRN
Qty: 30 CAPSULE | Refills: 0 | Status: SHIPPED | OUTPATIENT
Start: 2025-07-22 | End: 2025-08-01

## 2025-07-22 RX ORDER — PREDNISONE 20 MG/1
TABLET ORAL
Qty: 16 TABLET | Refills: 0 | Status: SHIPPED | OUTPATIENT
Start: 2025-07-22 | End: 2025-08-01

## 2025-07-22 RX ORDER — ALBUTEROL SULFATE 0.83 MG/ML
SOLUTION RESPIRATORY (INHALATION)
Status: DISCONTINUED
Start: 2025-07-22 | End: 2025-07-22 | Stop reason: HOSPADM

## 2025-07-22 RX ORDER — ALPRAZOLAM 0.5 MG
2 TABLET ORAL ONCE
Status: COMPLETED | OUTPATIENT
Start: 2025-07-22 | End: 2025-07-22

## 2025-07-22 RX ORDER — DOXYCYCLINE HYCLATE 100 MG
100 TABLET ORAL 2 TIMES DAILY
Qty: 13 TABLET | Refills: 0 | Status: SHIPPED | OUTPATIENT
Start: 2025-07-22 | End: 2025-07-29

## 2025-07-22 RX ORDER — METHOCARBAMOL 500 MG/1
500 TABLET, FILM COATED ORAL ONCE
Status: COMPLETED | OUTPATIENT
Start: 2025-07-22 | End: 2025-07-22

## 2025-07-22 RX ORDER — BENZONATATE 100 MG/1
200 CAPSULE ORAL ONCE
Status: COMPLETED | OUTPATIENT
Start: 2025-07-22 | End: 2025-07-22

## 2025-07-22 RX ORDER — ALBUTEROL SULFATE 90 UG/1
2 INHALANT RESPIRATORY (INHALATION) 4 TIMES DAILY PRN
Qty: 18 G | Refills: 0 | Status: SHIPPED | OUTPATIENT
Start: 2025-07-22

## 2025-07-22 RX ORDER — PREDNISONE 20 MG/1
40 TABLET ORAL ONCE
Status: COMPLETED | OUTPATIENT
Start: 2025-07-22 | End: 2025-07-22

## 2025-07-22 RX ORDER — LIDOCAINE 4 G/G
1 PATCH TOPICAL DAILY
Status: DISCONTINUED | OUTPATIENT
Start: 2025-07-22 | End: 2025-07-22 | Stop reason: HOSPADM

## 2025-07-22 RX ORDER — CYCLOBENZAPRINE HCL 10 MG
10 TABLET ORAL ONCE
Status: COMPLETED | OUTPATIENT
Start: 2025-07-22 | End: 2025-07-22

## 2025-07-22 RX ORDER — CYCLOBENZAPRINE HCL 10 MG
10 TABLET ORAL 3 TIMES DAILY PRN
Qty: 21 TABLET | Refills: 0 | Status: SHIPPED | OUTPATIENT
Start: 2025-07-22 | End: 2025-08-01

## 2025-07-22 RX ORDER — SODIUM CHLORIDE, SODIUM LACTATE, POTASSIUM CHLORIDE, AND CALCIUM CHLORIDE .6; .31; .03; .02 G/100ML; G/100ML; G/100ML; G/100ML
500 INJECTION, SOLUTION INTRAVENOUS ONCE
Status: COMPLETED | OUTPATIENT
Start: 2025-07-22 | End: 2025-07-22

## 2025-07-22 RX ORDER — DOXYCYCLINE 100 MG/1
100 CAPSULE ORAL ONCE
Status: COMPLETED | OUTPATIENT
Start: 2025-07-22 | End: 2025-07-22

## 2025-07-22 RX ADMIN — ALBUTEROL SULFATE 5 MG: 2.5 SOLUTION RESPIRATORY (INHALATION) at 20:04

## 2025-07-22 RX ADMIN — ALPRAZOLAM 2 MG: 0.5 TABLET ORAL at 19:52

## 2025-07-22 RX ADMIN — BENZONATATE 200 MG: 100 CAPSULE ORAL at 20:59

## 2025-07-22 RX ADMIN — SODIUM CHLORIDE, SODIUM LACTATE, POTASSIUM CHLORIDE, AND CALCIUM CHLORIDE 500 ML: .6; .31; .03; .02 INJECTION, SOLUTION INTRAVENOUS at 20:18

## 2025-07-22 RX ADMIN — METHOCARBAMOL 500 MG: 500 TABLET ORAL at 19:53

## 2025-07-22 RX ADMIN — IPRATROPIUM BROMIDE AND ALBUTEROL SULFATE 1 DOSE: .5; 2.5 SOLUTION RESPIRATORY (INHALATION) at 20:04

## 2025-07-22 RX ADMIN — CYCLOBENZAPRINE HYDROCHLORIDE 10 MG: 10 TABLET, FILM COATED ORAL at 20:58

## 2025-07-22 RX ADMIN — DOXYCYCLINE 100 MG: 100 CAPSULE ORAL at 21:49

## 2025-07-22 RX ADMIN — ACETAMINOPHEN 1000 MG: 500 TABLET ORAL at 20:59

## 2025-07-22 RX ADMIN — PREDNISONE 40 MG: 20 TABLET ORAL at 19:56

## 2025-07-22 ASSESSMENT — PAIN DESCRIPTION - LOCATION
LOCATION: BACK
LOCATION: BACK

## 2025-07-22 ASSESSMENT — PAIN DESCRIPTION - ORIENTATION: ORIENTATION: MID

## 2025-07-22 ASSESSMENT — PAIN SCALES - GENERAL
PAINLEVEL_OUTOF10: 6
PAINLEVEL_OUTOF10: 6

## 2025-07-22 ASSESSMENT — PAIN - FUNCTIONAL ASSESSMENT: PAIN_FUNCTIONAL_ASSESSMENT: 0-10

## 2025-07-22 NOTE — FLOWSHEET NOTE
Patient stated that she has had a cough, fever, back pain, SOB, hypertension x 1 day, symptoms worsened today, EMS gave duoneb, patient stated that it did not help.

## 2025-07-22 NOTE — ED PROVIDER NOTES
THE [facility]  EMERGENCY DEPARTMENT ENCOUNTER          ATTENDING PHYSICIAN NOTE       Date of evaluation: 7/22/2025      Assessment/ Medical Decion Making     MDM: Mell Vicente is a 30 y.o. female with history of tobacco use, bipolar disorder, remote PE not currently anticoagulated who presents for evaluation of cough and shortness of breath.     Cardiopulmonary workup initiated to evaluate for symptoms.    EKG obtained and interpreted by me with sinus rhythm with sinus arrhythmia, normal axis, incomplete right bundle, no concerning ST or T wave changes.  Troponin undetectably low.  With 2 days of symptoms do not feel that she needs serial investigations.  BNP is mildly elevated without prior for comparison.  We discussed risk of PE with her prior history.  We discussed obtaining CT PE study however, with her cough and significant wheezing bilaterally, I am significantly more concerned for bronchitis, as I would expect her lungs to be clear if PE were the cause of her symptoms.  We agreed to risk ratified with a D-dimer which was within normal limits.  Do not feel that she needs further workup for PE.  She was given steroids, nebs, and medicine for muscular pain that I believe is related to coughing.  On reassessment she is significantly improved and lungs are clear on my repeat exam.  Because she smokes cigarettes and has leukocytosis we agreed through shared decision making to treat with antibiotics in addition to steroids and inhaler for home.  She was also provided with medications for symptom control for her back pain and coughing.    She was also interested in initiating nicotine replacement therapy to help her quit smoking.  After discussion of risk and benefits of various treatment modalities for approximately 4 minutes she was amenable to trying the patch so I have prescribed this.    Counseled to return to the ED for worsening symptoms and otherwise to follow-up with her primary care  Statement Selected

## 2025-07-23 NOTE — DISCHARGE INSTRUCTIONS
You were seen in the emergency department today for cough and trouble breathing.  You had wheezing all throughout your lungs on exam.  I believe your symptoms are caused by bronchitis.  This is usually caused by a virus but because your white blood cell count is elevated, it is possible that this is bacterial.  I have ordered you prescriptions for prednisone, albuterol, and doxycycline to treat the bronchitis.  I have also given you a prescription for nicotine patch to help you quit smoking.  I have given you prescriptions for a cough medicine and a muscle relaxer to help with your symptoms.  Please come back for worsening symptoms.  Otherwise, please call to schedule an appointment with your primary care doctor for follow-up.

## 2025-07-24 SDOH — HEALTH STABILITY: PHYSICAL HEALTH: ON AVERAGE, HOW MANY DAYS PER WEEK DO YOU ENGAGE IN MODERATE TO STRENUOUS EXERCISE (LIKE A BRISK WALK)?: 6 DAYS

## 2025-07-24 SDOH — HEALTH STABILITY: PHYSICAL HEALTH: ON AVERAGE, HOW MANY MINUTES DO YOU ENGAGE IN EXERCISE AT THIS LEVEL?: 20 MIN

## 2025-07-25 ENCOUNTER — OFFICE VISIT (OUTPATIENT)
Dept: FAMILY MEDICINE CLINIC | Age: 30
End: 2025-07-25
Payer: MEDICAID

## 2025-07-25 VITALS
SYSTOLIC BLOOD PRESSURE: 130 MMHG | OXYGEN SATURATION: 97 % | TEMPERATURE: 98.1 F | HEART RATE: 89 BPM | BODY MASS INDEX: 32.33 KG/M2 | WEIGHT: 189.4 LBS | DIASTOLIC BLOOD PRESSURE: 100 MMHG | HEIGHT: 64 IN

## 2025-07-25 DIAGNOSIS — Z72.0 TOBACCO ABUSE: ICD-10-CM

## 2025-07-25 DIAGNOSIS — I10 PRIMARY HYPERTENSION: ICD-10-CM

## 2025-07-25 DIAGNOSIS — J40 BRONCHITIS: ICD-10-CM

## 2025-07-25 DIAGNOSIS — Z76.89 ENCOUNTER TO ESTABLISH CARE: Primary | ICD-10-CM

## 2025-07-25 PROCEDURE — 3080F DIAST BP >= 90 MM HG: CPT

## 2025-07-25 PROCEDURE — 3074F SYST BP LT 130 MM HG: CPT

## 2025-07-25 PROCEDURE — 99204 OFFICE O/P NEW MOD 45 MIN: CPT

## 2025-07-25 RX ORDER — FLUTICASONE PROPIONATE AND SALMETEROL XINAFOATE 115; 21 UG/1; UG/1
2 AEROSOL, METERED RESPIRATORY (INHALATION) 2 TIMES DAILY
Qty: 8 G | Refills: 0 | Status: SHIPPED | OUTPATIENT
Start: 2025-07-25

## 2025-07-25 RX ORDER — NICOTINE 21 MG/24HR
1 PATCH, TRANSDERMAL 24 HOURS TRANSDERMAL DAILY
Qty: 42 PATCH | Refills: 0 | Status: SHIPPED | OUTPATIENT
Start: 2025-07-25 | End: 2025-09-05

## 2025-07-25 RX ORDER — DEXTROMETHORPHAN HYDROBROMIDE AND PROMETHAZINE HYDROCHLORIDE 15; 6.25 MG/5ML; MG/5ML
2.5 SYRUP ORAL 4 TIMES DAILY PRN
Qty: 70 ML | Refills: 0 | Status: SHIPPED | OUTPATIENT
Start: 2025-07-25 | End: 2025-08-01

## 2025-07-25 RX ORDER — HYDROCHLOROTHIAZIDE 12.5 MG/1
12.5 CAPSULE ORAL EVERY MORNING
Qty: 30 CAPSULE | Refills: 0 | Status: SHIPPED | OUTPATIENT
Start: 2025-07-25

## 2025-07-25 SDOH — ECONOMIC STABILITY: FOOD INSECURITY: WITHIN THE PAST 12 MONTHS, THE FOOD YOU BOUGHT JUST DIDN'T LAST AND YOU DIDN'T HAVE MONEY TO GET MORE.: NEVER TRUE

## 2025-07-25 SDOH — ECONOMIC STABILITY: FOOD INSECURITY: WITHIN THE PAST 12 MONTHS, YOU WORRIED THAT YOUR FOOD WOULD RUN OUT BEFORE YOU GOT MONEY TO BUY MORE.: NEVER TRUE

## 2025-07-25 ASSESSMENT — ENCOUNTER SYMPTOMS
CHEST TIGHTNESS: 0
TROUBLE SWALLOWING: 0
SINUS PRESSURE: 0
COUGH: 1
VOICE CHANGE: 0
SHORTNESS OF BREATH: 1
DIARRHEA: 0
EYE REDNESS: 0
ABDOMINAL PAIN: 0
RHINORRHEA: 0
COLOR CHANGE: 0
EYE DISCHARGE: 0
WHEEZING: 1
VOMITING: 0
SINUS PAIN: 0
NAUSEA: 0
SORE THROAT: 0
EYE ITCHING: 0

## 2025-07-25 ASSESSMENT — PATIENT HEALTH QUESTIONNAIRE - PHQ9
SUM OF ALL RESPONSES TO PHQ QUESTIONS 1-9: 3
4. FEELING TIRED OR HAVING LITTLE ENERGY: SEVERAL DAYS
8. MOVING OR SPEAKING SO SLOWLY THAT OTHER PEOPLE COULD HAVE NOTICED. OR THE OPPOSITE, BEING SO FIGETY OR RESTLESS THAT YOU HAVE BEEN MOVING AROUND A LOT MORE THAN USUAL: NOT AT ALL
5. POOR APPETITE OR OVEREATING: NOT AT ALL
SUM OF ALL RESPONSES TO PHQ QUESTIONS 1-9: 3
1. LITTLE INTEREST OR PLEASURE IN DOING THINGS: NOT AT ALL
6. FEELING BAD ABOUT YOURSELF - OR THAT YOU ARE A FAILURE OR HAVE LET YOURSELF OR YOUR FAMILY DOWN: NOT AT ALL
7. TROUBLE CONCENTRATING ON THINGS, SUCH AS READING THE NEWSPAPER OR WATCHING TELEVISION: NOT AT ALL
10. IF YOU CHECKED OFF ANY PROBLEMS, HOW DIFFICULT HAVE THESE PROBLEMS MADE IT FOR YOU TO DO YOUR WORK, TAKE CARE OF THINGS AT HOME, OR GET ALONG WITH OTHER PEOPLE: NOT DIFFICULT AT ALL
2. FEELING DOWN, DEPRESSED OR HOPELESS: NOT AT ALL
SUM OF ALL RESPONSES TO PHQ QUESTIONS 1-9: 3
9. THOUGHTS THAT YOU WOULD BE BETTER OFF DEAD, OR OF HURTING YOURSELF: NOT AT ALL
3. TROUBLE FALLING OR STAYING ASLEEP: MORE THAN HALF THE DAYS
SUM OF ALL RESPONSES TO PHQ QUESTIONS 1-9: 3

## 2025-07-25 NOTE — PROGRESS NOTES
Mell Vicente (:  1995) is a 30 y.o. female,New patient, here for evaluation of the following chief complaint(s):  Other (Pt c/o of was in hospital on the  and can't breath, elevated BP, elevated heart rate, wheezing, got worse) and Establish Care      Assessment & Plan  Encounter to establish care   -Personal medical history, surgical history, family history reviewed.  Medications reconciled.  -Care gaps addressed.  Deferred screenings, deferred vaccinations today.  -Age-appropriate healthcare topics discussed.  -Educated on office policies and procedures.  -Follow-up in 4 weeks blood pressure and blood work       Bronchitis   Acute condition, new, Supportive care with appropriate antipyretics and fluids.  - Likely secondary to smoking  -Continue smoking cessation  -Treatment options discussed.  Phenergan DM and Advair are being sent to the pharmacy.   The medication uses and side effects were discussed with the patient.  Patient verbalized understanding and agrees to the plan.  - Encouraged decreasing frequency of albuterol use  -Follow-up if no improvement  Orders:  •  promethazine-dextromethorphan (PROMETHAZINE-DM) 6.25-15 MG/5ML syrup; Take 2.5 mLs by mouth 4 times daily as needed for Cough  •  fluticasone-salmeterol (ADVAIR HFA) 115-21 MCG/ACT inhaler; Inhale 2 puffs into the lungs 2 times daily    Tobacco abuse   Chronic, not at goal (unstable), currently ongoing quitting process  - Refilled nicotine patches to pharmacy  -Follow-up 6 months  Orders:  •  nicotine (NICODERM CQ) 14 MG/24HR; Place 1 patch onto the skin daily    Primary hypertension   New, uncertain prognosis, uncontrolled  - Likely worsened by frequent albuterol use, patient reports has been worse before albuterol use  -Treatment options discussed.  HCTZ being sent to the pharmacy.   The medication uses and side effects were discussed with the patient.  Patient verbalized understanding and agrees to the plan.  - Follow-up

## 2025-07-25 NOTE — PATIENT INSTRUCTIONS
Recommend compliance to DASH diet, which is limiting sodium to 2000 mg daily or less, and limit fatty, fried, and ultra-processed foods in the diet.  Increase physical exercise to 150 minutes weekly or more per AHA.  Walk more than 7000 steps daily.  Drink 64 ounces of water daily or more.

## 2025-07-29 DIAGNOSIS — Z13.220 SCREENING CHOLESTEROL LEVEL: ICD-10-CM

## 2025-07-29 DIAGNOSIS — F31.81 BIPOLAR 2 DISORDER (HCC): ICD-10-CM

## 2025-07-29 LAB
CHOLEST SERPL-MCNC: 186 MG/DL (ref 0–199)
HDLC SERPL-MCNC: 81 MG/DL (ref 40–60)
LDLC SERPL CALC-MCNC: 77 MG/DL
LITHIUM DOSE: NORMAL MG
LITHIUM SERPL-MCNC: 0.8 MMOL/L (ref 0.6–1.2)
TRIGL SERPL-MCNC: 139 MG/DL (ref 0–150)
VLDLC SERPL CALC-MCNC: 28 MG/DL

## 2025-07-31 LAB — LPA SERPL-MCNC: <6 MG/DL

## 2025-08-25 DIAGNOSIS — I10 PRIMARY HYPERTENSION: ICD-10-CM

## 2025-08-25 RX ORDER — AMLODIPINE BESYLATE 5 MG/1
5 TABLET ORAL DAILY
Qty: 30 TABLET | Refills: 0 | Status: SHIPPED | OUTPATIENT
Start: 2025-08-25

## 2025-08-31 DIAGNOSIS — J40 BRONCHITIS: ICD-10-CM

## 2025-09-02 RX ORDER — FLUTICASONE PROPIONATE AND SALMETEROL XINAFOATE 115; 21 UG/1; UG/1
AEROSOL, METERED RESPIRATORY (INHALATION)
Qty: 12 G | Refills: 1 | Status: SHIPPED | OUTPATIENT
Start: 2025-09-02

## (undated) DEVICE — MERCY FAIRFIELD TURNOVER KIT: Brand: MEDLINE INDUSTRIES, INC.

## (undated) DEVICE — PAD,ABDOMINAL,8"X10",ST,LF: Brand: MEDLINE

## (undated) DEVICE — CANNULA SEAL

## (undated) DEVICE — TIP COVER ACCESSORY

## (undated) DEVICE — CHLORAPREP 26ML ORANGE

## (undated) DEVICE — STANDARD HYPODERMIC NEEDLE,POLYPROPYLENE HUB: Brand: MONOJECT

## (undated) DEVICE — SOLUTION IV IRRIG WATER 500ML POUR BRL ST 2F7113

## (undated) DEVICE — ROBOTIC PK

## (undated) DEVICE — INVIEW CLEAR LEGGINGS: Brand: CONVERTORS

## (undated) DEVICE — SUTURE MCRYL SZ 4-0 L27IN ABSRB UD L19MM PS-2 1/2 CIR PRIM Y426H

## (undated) DEVICE — PROCEDURE KIT ENDOSCP CUST

## (undated) DEVICE — DEVICE SECUREMENT AD W/ TRICOT ANCHR PD FOR F LTX SIL CATH

## (undated) DEVICE — DEVICE MANIP L330MM DIA4.5MM UTER DISP INJ ZINNANTI KRONNER

## (undated) DEVICE — SET VLV 3 PC AWS DISPOSABLE GRDIAN SCOPEVALET

## (undated) DEVICE — TRAY PREP DRY W/ PREM GLV 2 APPL 6 SPNG 2 UNDPD 1 OVERWRAP

## (undated) DEVICE — SYRINGE, LUER LOCK, 10ML: Brand: MEDLINE

## (undated) DEVICE — TOTAL TRAY, DB, 100% SILI FOLEY, 16FR 10: Brand: MEDLINE

## (undated) DEVICE — PAD TBL OP RM TRENDELENBURG STATIC TORSO W/STRAPS

## (undated) DEVICE — PAD TABLE RAMPED 1 IN TO 2 IN TRENDELENBURG

## (undated) DEVICE — SYRINGE MED 30ML STD CLR PLAS LUERLOCK TIP N CTRL DISP

## (undated) DEVICE — ELECTRODE PT RET AD L9FT HI MOIST COND ADH HYDRGEL CORDED

## (undated) DEVICE — BLANKET WRM W29.9XL79.1IN UP BODY FORC AIR MISTRAL-AIR

## (undated) DEVICE — NEEDLE INSUF L120MM DIA2MM DISP FOR PNEUMOPERI ENDOPATH

## (undated) DEVICE — ADHESIVE SKIN CLSR 0.7ML TOP DERMBND ADV

## (undated) DEVICE — 30977 SEE SHARP - ENHANCED INTRAOPERATIVE LAPAROSCOPE CLEANING & DEFOGGING: Brand: 30977 SEE SHARP - ENHANCED INTRAOPERATIVE LAPAROSCOPE CLEANING & DEFOGGING

## (undated) DEVICE — TRI-LUMEN FILTERED TUBE SET WITH ACTIVATED CHARCOAL FILTER: Brand: AIRSEAL

## (undated) DEVICE — GOWN,AURORA,NONREINF,RAGLAN,XXL,STERILE: Brand: MEDLINE

## (undated) DEVICE — LIGHT HANDLE: Brand: DEVON

## (undated) DEVICE — BW-412T DISP COMBO CLEANING BRUSH: Brand: SINGLE USE COMBINATION CLEANING BRUSH

## (undated) DEVICE — PROTECTOR EYE PT SELF ADH NS OPT GRD LF

## (undated) DEVICE — ARM DRAPE

## (undated) DEVICE — BLADELESS OBTURATOR: Brand: WECK VISTA

## (undated) DEVICE — AIRSEAL 8 MM ACCESS PORT AND LOW PROFILE OBTURATOR WITH BLADELESS OPTICAL TIP, 120 MM LENGTH: Brand: AIRSEAL

## (undated) DEVICE — MOUTHPIECE ENDOSCP L CTRL OPN AND SIDE PORTS DISP